# Patient Record
Sex: FEMALE | Race: WHITE | NOT HISPANIC OR LATINO | Employment: OTHER | URBAN - METROPOLITAN AREA
[De-identification: names, ages, dates, MRNs, and addresses within clinical notes are randomized per-mention and may not be internally consistent; named-entity substitution may affect disease eponyms.]

---

## 2017-04-12 ENCOUNTER — TELEPHONE (OUTPATIENT)
Dept: SMOKING CESSATION | Facility: CLINIC | Age: 82
End: 2017-04-12

## 2017-04-19 ENCOUNTER — CLINICAL SUPPORT (OUTPATIENT)
Dept: SMOKING CESSATION | Facility: CLINIC | Age: 82
End: 2017-04-19
Payer: COMMERCIAL

## 2017-04-19 DIAGNOSIS — F17.200 NICOTINE DEPENDENCE: Primary | ICD-10-CM

## 2017-04-19 PROCEDURE — 99407 BEHAV CHNG SMOKING > 10 MIN: CPT | Mod: S$GLB,,,

## 2017-05-25 ENCOUNTER — HOSPITAL ENCOUNTER (OUTPATIENT)
Dept: RESPIRATORY THERAPY | Facility: HOSPITAL | Age: 82
Discharge: HOME | End: 2017-05-25
Attending: INTERNAL MEDICINE
Payer: MEDICARE

## 2017-05-25 DIAGNOSIS — J44.1 COPD EXACERBATION: ICD-10-CM

## 2017-05-25 DIAGNOSIS — J44.1 COPD EXACERBATION: Primary | ICD-10-CM

## 2017-05-25 PROCEDURE — 94010 BREATHING CAPACITY TEST: CPT

## 2017-06-08 NOTE — PROCEDURES
DATE OF STUDY:  05/25/2017    The effort appears adequate.  The inspiratory portion of the flow volume loop is   normal.  The FVC is normal.  FEV1 is mildly reduced at 1.3 liters or 71% of   predicted.  FEV1/FVC is moderately reduced.  FEF 25-75 is severely reduced.    After administering an appropriate bronchodilator, no significant change is   appreciated.    IMPRESSION:  Spirometry demonstrates a fixed moderate obstruction.  Clinical and   radiographic correlation is suggested.      HES/SN  dd: 06/07/2017 18:31:18 (CDT)  td: 06/08/2017 01:25:03 (CDT)  Doc ID   #[id number]  Job ID #[id number]    CC: Ghassan Ayoub M.D.

## 2017-10-20 ENCOUNTER — TELEPHONE (OUTPATIENT)
Dept: SMOKING CESSATION | Facility: CLINIC | Age: 82
End: 2017-10-20

## 2017-11-07 ENCOUNTER — TELEPHONE (OUTPATIENT)
Dept: SMOKING CESSATION | Facility: CLINIC | Age: 82
End: 2017-11-07

## 2017-12-18 ENCOUNTER — CLINICAL SUPPORT (OUTPATIENT)
Dept: SMOKING CESSATION | Facility: CLINIC | Age: 82
End: 2017-12-18
Payer: COMMERCIAL

## 2017-12-18 DIAGNOSIS — F17.200 NICOTINE DEPENDENCE: Primary | ICD-10-CM

## 2017-12-18 PROCEDURE — 99407 BEHAV CHNG SMOKING > 10 MIN: CPT | Mod: S$GLB,,,

## 2018-01-02 ENCOUNTER — CLINICAL SUPPORT (OUTPATIENT)
Dept: SMOKING CESSATION | Facility: CLINIC | Age: 83
End: 2018-01-02
Payer: COMMERCIAL

## 2018-01-02 DIAGNOSIS — F17.200 NICOTINE DEPENDENCE: Primary | ICD-10-CM

## 2018-01-02 PROCEDURE — 99404 PREV MED CNSL INDIV APPRX 60: CPT | Mod: S$GLB,,,

## 2018-01-02 PROCEDURE — 99999 PR PBB SHADOW E&M-EST. PATIENT-LVL I: CPT | Mod: PBBFAC,,,

## 2018-01-02 RX ORDER — DM/P-EPHED/ACETAMINOPH/DOXYLAM 30-7.5/3
2 LIQUID (ML) ORAL
Qty: 108 LOZENGE | Refills: 0 | Status: SHIPPED | OUTPATIENT
Start: 2018-01-02 | End: 2018-02-06

## 2018-01-09 ENCOUNTER — CLINICAL SUPPORT (OUTPATIENT)
Dept: SMOKING CESSATION | Facility: CLINIC | Age: 83
End: 2018-01-09
Payer: COMMERCIAL

## 2018-01-09 DIAGNOSIS — F17.200 NICOTINE DEPENDENCE: Primary | ICD-10-CM

## 2018-01-09 PROCEDURE — 99999 PR PBB SHADOW E&M-EST. PATIENT-LVL I: CPT | Mod: PBBFAC,,,

## 2018-01-09 PROCEDURE — 90853 GROUP PSYCHOTHERAPY: CPT | Mod: S$GLB,,,

## 2018-01-09 NOTE — Clinical Note
Patient reports that she is tobacco free for 3 days. Patient is using strategies previously discussed. She is returning to Group and has completed the program several times in the past. She is motivated to stay tobacco free and she and a friend are supporting each other. The patient remains on the prescribed tobacco cessation medication regimen of 2 mg nicotine lozenge as needed, without any negative side effects at this time. She states that she is using the lozenge about 3-4 times per day. The patient will continue to come to the clinic for additional support and encouragement.

## 2018-01-09 NOTE — PROGRESS NOTES
Site: [facility]  Date:  1/9/2018  Clinical Status of Patient: Outpatient   Length of Service and Code: 60 minutes - 53800   Number in Attendance: 5  Group Activities/Focus of Group:  orientation, client introductions, completion of TCRS (Tobacco Cessation Rating Scale) learned addiction model, cues/triggers, personal reasons for quitting, medications, goals, quit date    Target symptoms:  withdrawal and medication side effects             The following were rated moderate (3) to severe (4) on TCRS:       Moderate 3: none     Severe 4: none   Patient's Response to Intervention: Patient reports that she is tobacco free for 3 days. Patient is using strategies previously discussed. She is returning to Group and has completed the program several times in the past. She is motivated to stay tobacco free and she and a friend are supporting each other. The patient remains on the prescribed tobacco cessation medication regimen of 2 mg nicotine lozenge as needed, without any negative side effects at this time. She states that she is using the lozenge about 3-4 times per day. The patient will continue to come to the clinic for additional support and encouragement.     Progress Toward Goals and Other Mental Status Changes: The patient denies any abnormal behavioral or mental changes at this time.   Diagnosis: Z72.0  Plan: The patient will continue with group therapy sessions and medication monitoring by CTTS. Prescribed medication management will be by physician.   Return to Clinic: 1 week

## 2018-01-16 ENCOUNTER — CLINICAL SUPPORT (OUTPATIENT)
Dept: SMOKING CESSATION | Facility: CLINIC | Age: 83
End: 2018-01-16
Payer: COMMERCIAL

## 2018-01-16 DIAGNOSIS — F17.200 NICOTINE DEPENDENCE: Primary | ICD-10-CM

## 2018-01-16 PROCEDURE — 99999 PR PBB SHADOW E&M-EST. PATIENT-LVL I: CPT | Mod: PBBFAC,,,

## 2018-01-16 PROCEDURE — 90853 GROUP PSYCHOTHERAPY: CPT | Mod: S$GLB,,,

## 2018-01-16 RX ORDER — IBUPROFEN 200 MG
1 TABLET ORAL DAILY
Qty: 14 PATCH | Refills: 0 | Status: SHIPPED | OUTPATIENT
Start: 2018-01-16 | End: 2021-03-17

## 2018-01-16 NOTE — PROGRESS NOTES
Smoking Cessation Group Session #2    Site: [facility]  Date:  1/16/2018  Clinical Status of Patient: Outpatient   Length of Service and Code: 60 minutes - 11469   Number in Attendance: 5  Group Activities/Focus of Group:  Sharing last weeks challenges, triggers, and coping activities to remain quit and/ or keep making progress toward cessation, completion of TCRS (Tobacco Cessation Rating Scale) learned addiction model, personal reasons for quitting, medications, goals, quit date.    Specific session focus: completion of TCRS (Tobacco Cessation Rating Scale) reviewed strategies, cues, and triggers. Introduced the negative impact of tobacco on health, the health advantages of discontinuing the use of tobacco, time line improved health changes after a quit, withdrawal issues to expect from nicotine and habit, and ways to achieve the goal of a quit.  Target symptoms:  withdrawal and medication side effects             The following were rated moderate (3) to severe (4) on TCRS:       Moderate 3: none     Severe 4:   none  Patient's Response to Intervention: Patient remains tobacco free at this time. Patient is using strategies previously discussed.  The patient remains on the prescribed tobacco cessation medication regimen of 14 mg nicotine patch QD and 2 mg nicotine lozenge as needed, without any negative side effects at this time. The patient will continue to come to the clinic for additional support and encouragement.   Progress Toward Goals and Other Mental Status Changes: The patient denies any abnormal behavioral or mental changes at this time.   Diagnosis: Z72.0  Plan: The patient will continue with group therapy sessions and medication monitoring by CTTS. Prescribed medication management will be by physician.   Return to Clinic: 1 week    Quit Date:  1/6/2018  Planned Quit Date:

## 2018-01-16 NOTE — Clinical Note
Patient remains tobacco free at this time. Patient is using strategies previously discussed. The patient remains on the prescribed tobacco cessation medication regimen of 14 mg nicotine patch QD and 2 mg nicotine lozenge as needed, without any negative side effects at this time. The patient will continue to come to the clinic for additional support and encouragement.

## 2018-01-23 ENCOUNTER — CLINICAL SUPPORT (OUTPATIENT)
Dept: SMOKING CESSATION | Facility: CLINIC | Age: 83
End: 2018-01-23
Payer: COMMERCIAL

## 2018-01-23 DIAGNOSIS — F17.200 NICOTINE DEPENDENCE: Primary | ICD-10-CM

## 2018-01-23 PROCEDURE — 90853 GROUP PSYCHOTHERAPY: CPT | Mod: S$GLB,,,

## 2018-01-23 PROCEDURE — 99999 PR PBB SHADOW E&M-EST. PATIENT-LVL I: CPT | Mod: PBBFAC,,,

## 2018-01-23 NOTE — PROGRESS NOTES
Smoking Cessation Group Session #3    Site: [facility]  Date:  1/23/2018  Clinical Status of Patient: Outpatient   Length of Service and Code: 60 minutes - 32752   Number in Attendance: 5  Group Activities/Focus of Group:  Sharing last weeks challenges, triggers, and coping activities to remain quit and/ or keep making progress toward cessation, completion of TCRS (Tobacco Cessation Rating Scale) learned addiction model, personal reasons for quitting, medications, goals, quit date.    Specific session focus: completion of TCRS (Tobacco Cessation Rating Scale) reviewed strategies, controlling environment, cues, triggers, new goals set. Introduced high risk situations with preparation interventions, caffeine similarities with withdrawal issues of habit and nicotine, Alcohol, Understanding urges, cravings, stress and relaxation. Open discussion with intervention discussion.  Target symptoms:  withdrawal and medication side effects             The following were rated moderate (3) to severe (4) on TCRS:       Moderate 3: irritable - discussed withdrawal and habit      Severe 4:   none  Patient's Response to Intervention: Patient remains tobacco free since 1/6/2018. Patient is using strategies previously discussed.  The patient remains on the prescribed tobacco cessation medication regimen of 14 mg nicotine patch QD without any negative side effects at this time. The patient will continue to come to the clinic for additional support and encouragement.   Progress Toward Goals and Other Mental Status Changes: The patient denies any abnormal behavioral or mental changes at this time.     Diagnosis: Z72.0  Plan: The patient will continue with group therapy sessions and medication monitoring by CTTS. Prescribed medication management will be by physician.   Return to Clinic: 1 week    Quit Date: 1/6/2018   Planned Quit Date:

## 2018-01-23 NOTE — Clinical Note
Patient remains tobacco free since 1/6/2018. Patient is using strategies previously discussed. The patient remains on the prescribed tobacco cessation medication regimen of 14 mg nicotine patch QD without any negative side effects at this time. The patient will continue to come to the clinic for additional support and encouragement.

## 2018-02-06 ENCOUNTER — CLINICAL SUPPORT (OUTPATIENT)
Dept: SMOKING CESSATION | Facility: CLINIC | Age: 83
End: 2018-02-06
Payer: COMMERCIAL

## 2018-02-06 DIAGNOSIS — F17.200 NICOTINE DEPENDENCE: Primary | ICD-10-CM

## 2018-02-06 PROCEDURE — 99999 PR PBB SHADOW E&M-EST. PATIENT-LVL I: CPT | Mod: PBBFAC,,,

## 2018-02-06 PROCEDURE — 90853 GROUP PSYCHOTHERAPY: CPT | Mod: S$GLB,,,

## 2018-02-06 NOTE — Clinical Note
Patient has has a slip this past week, due to a conflict with her best friend that upset her, so she went out and bought a pack of cigarettes. She smoked about 4 cigarettes per day since her slip. We discussed her triggers related to the slip and how she would approach this if it happened again. She had stopped using her nicotine patch QD, she had tolerated well without any negative side effects thus far. I recommended she resume of her use of her prescribed tobacco cessation regimen of 14 mg nicotine patch QD.

## 2018-02-06 NOTE — PROGRESS NOTES
Smoking Cessation Group Session #4    Site: [facility]  Date:  2/6/2018  Clinical Status of Patient: Outpatient   Length of Service and Code: 60 minutes - 60100   Number in Attendance: 5  Group Activities/Focus of Group:  Sharing last weeks challenges, triggers, and coping activities to remain quit and/ or keep making progress toward cessation, completion of TCRS (Tobacco Cessation Rating Scale) learned addiction model, personal reasons for quitting, medications, goals, quit date.    Specific session focus: completion of TCRS (Tobacco Cessation Rating Scale) reviewed strategies, habitual behavior, high risks situations, understanding urges and cravings, stress and relaxation with open discussion and additional interventions, Introduced lapses, relapses, understanding them and analyzing the situation of a lapse, conflict issues that may be linked to a lapse.   Target symptoms:  withdrawal and medication side effects             The following were rated moderate (3) to severe (4) on TCRS:       Moderate 3: Desire or crave - discussed habit and withdrawal      Severe 4:   none  Patient's Response to Intervention: Patient has has a slip this past week, due to a conflict with her best friend that upset her, so she went out and bought a pack of cigarettes. She smoked about 4 cigarettes per day since her slip. We discussed her triggers related to the slip and how she would approach this if it happened again. She had stopped using her nicotine patch QD, she had tolerated well without any negative side effects thus far. I recommended she resume of her use of her prescribed tobacco cessation regimen of 14 mg nicotine patch QD.   Progress Toward Goals and Other Mental Status Changes: The patient denies any abnormal behavioral or mental changes at this time.   Diagnosis: Z72.0  Plan: The patient will continue with group therapy sessions and medication monitoring by CTTS. Prescribed medication management will be by physician.    Return to Clinic: 2 weeks    Quit Date:    Planned Quit Date: discussed but not yet set

## 2018-04-17 ENCOUNTER — CLINICAL SUPPORT (OUTPATIENT)
Dept: SMOKING CESSATION | Facility: CLINIC | Age: 83
End: 2018-04-17
Payer: COMMERCIAL

## 2018-04-17 DIAGNOSIS — F17.200 NICOTINE DEPENDENCE: Primary | ICD-10-CM

## 2018-04-17 PROCEDURE — 99407 BEHAV CHNG SMOKING > 10 MIN: CPT | Mod: S$GLB,,,

## 2018-05-22 ENCOUNTER — LAB VISIT (OUTPATIENT)
Dept: LAB | Facility: HOSPITAL | Age: 83
End: 2018-05-22
Attending: INTERNAL MEDICINE
Payer: MEDICARE

## 2018-05-22 DIAGNOSIS — E78.2 MIXED HYPERLIPIDEMIA: Primary | ICD-10-CM

## 2018-05-22 DIAGNOSIS — R73.09 OTHER ABNORMAL GLUCOSE: ICD-10-CM

## 2018-05-22 DIAGNOSIS — I10 ESSENTIAL HYPERTENSION, MALIGNANT: ICD-10-CM

## 2018-05-22 LAB
ALBUMIN SERPL BCP-MCNC: 3.6 G/DL
ALP SERPL-CCNC: 58 U/L
ALT SERPL W/O P-5'-P-CCNC: 13 U/L
ANION GAP SERPL CALC-SCNC: 8 MMOL/L
AST SERPL-CCNC: 16 U/L
BASOPHILS # BLD AUTO: 0.1 K/UL
BASOPHILS NFR BLD: 0.9 %
BILIRUB SERPL-MCNC: 0.7 MG/DL
BILIRUB UR QL STRIP: NEGATIVE
BUN SERPL-MCNC: 16 MG/DL
CALCIUM SERPL-MCNC: 9.4 MG/DL
CHLORIDE SERPL-SCNC: 92 MMOL/L
CHOLEST SERPL-MCNC: 165 MG/DL
CHOLEST/HDLC SERPL: 2.5 {RATIO}
CLARITY UR: CLEAR
CO2 SERPL-SCNC: 32 MMOL/L
COLOR UR: YELLOW
CREAT SERPL-MCNC: 0.9 MG/DL
DIFFERENTIAL METHOD: ABNORMAL
EOSINOPHIL # BLD AUTO: 0.1 K/UL
EOSINOPHIL NFR BLD: 0.8 %
ERYTHROCYTE [DISTWIDTH] IN BLOOD BY AUTOMATED COUNT: 13.6 %
EST. GFR  (AFRICAN AMERICAN): >60 ML/MIN/1.73 M^2
EST. GFR  (NON AFRICAN AMERICAN): 58 ML/MIN/1.73 M^2
GLUCOSE SERPL-MCNC: 124 MG/DL
GLUCOSE UR QL STRIP: NEGATIVE
HCT VFR BLD AUTO: 43.1 %
HDLC SERPL-MCNC: 65 MG/DL
HDLC SERPL: 39.4 %
HGB BLD-MCNC: 14.6 G/DL
HGB UR QL STRIP: NEGATIVE
KETONES UR QL STRIP: NEGATIVE
LDLC SERPL CALC-MCNC: 85.8 MG/DL
LEUKOCYTE ESTERASE UR QL STRIP: NEGATIVE
LYMPHOCYTES # BLD AUTO: 1.1 K/UL
LYMPHOCYTES NFR BLD: 16.3 %
MCH RBC QN AUTO: 31.2 PG
MCHC RBC AUTO-ENTMCNC: 33.9 G/DL
MCV RBC AUTO: 92 FL
MONOCYTES # BLD AUTO: 0.6 K/UL
MONOCYTES NFR BLD: 8.9 %
NEUTROPHILS # BLD AUTO: 5.1 K/UL
NEUTROPHILS NFR BLD: 73.1 %
NITRITE UR QL STRIP: NEGATIVE
NONHDLC SERPL-MCNC: 100 MG/DL
PH UR STRIP: 8 [PH] (ref 5–8)
PLATELET # BLD AUTO: 282 K/UL
PMV BLD AUTO: 7.9 FL
POTASSIUM SERPL-SCNC: 3.8 MMOL/L
PROT SERPL-MCNC: 7.6 G/DL
PROT UR QL STRIP: NEGATIVE
RBC # BLD AUTO: 4.68 M/UL
SODIUM SERPL-SCNC: 132 MMOL/L
SP GR UR STRIP: 1.01 (ref 1–1.03)
TRIGL SERPL-MCNC: 71 MG/DL
URN SPEC COLLECT METH UR: NORMAL
UROBILINOGEN UR STRIP-ACNC: 1 EU/DL
WBC # BLD AUTO: 7 K/UL

## 2018-05-22 PROCEDURE — 87086 URINE CULTURE/COLONY COUNT: CPT

## 2018-05-22 PROCEDURE — 80061 LIPID PANEL: CPT

## 2018-05-22 PROCEDURE — 81003 URINALYSIS AUTO W/O SCOPE: CPT

## 2018-05-22 PROCEDURE — 36415 COLL VENOUS BLD VENIPUNCTURE: CPT

## 2018-05-22 PROCEDURE — 80053 COMPREHEN METABOLIC PANEL: CPT

## 2018-05-22 PROCEDURE — 85025 COMPLETE CBC W/AUTO DIFF WBC: CPT

## 2018-05-23 LAB — BACTERIA UR CULT: NO GROWTH

## 2018-06-12 ENCOUNTER — OFFICE VISIT (OUTPATIENT)
Dept: PODIATRY | Facility: CLINIC | Age: 83
End: 2018-06-12
Payer: MEDICARE

## 2018-06-12 ENCOUNTER — HOSPITAL ENCOUNTER (OUTPATIENT)
Dept: RADIOLOGY | Facility: CLINIC | Age: 83
Discharge: HOME | End: 2018-06-12
Attending: PODIATRIST
Payer: MEDICARE

## 2018-06-12 VITALS — WEIGHT: 149.94 LBS | BODY MASS INDEX: 25.6 KG/M2 | HEIGHT: 64 IN

## 2018-06-12 DIAGNOSIS — M20.42 HAMMER TOES OF BOTH FEET: ICD-10-CM

## 2018-06-12 DIAGNOSIS — M79.671 FOOT PAIN, BILATERAL: ICD-10-CM

## 2018-06-12 DIAGNOSIS — M20.41 HAMMER TOES OF BOTH FEET: ICD-10-CM

## 2018-06-12 DIAGNOSIS — M79.671 FOOT PAIN, BILATERAL: Primary | ICD-10-CM

## 2018-06-12 DIAGNOSIS — M77.9 CAPSULITIS: ICD-10-CM

## 2018-06-12 DIAGNOSIS — M79.672 FOOT PAIN, BILATERAL: Primary | ICD-10-CM

## 2018-06-12 DIAGNOSIS — M79.672 FOOT PAIN, BILATERAL: ICD-10-CM

## 2018-06-12 PROCEDURE — 29540 STRAPPING ANKLE &/FOOT: CPT | Mod: 50,S$GLB,, | Performed by: PODIATRIST

## 2018-06-12 PROCEDURE — 73630 X-RAY EXAM OF FOOT: CPT | Mod: 26,50,S$GLB, | Performed by: RADIOLOGY

## 2018-06-12 PROCEDURE — 99203 OFFICE O/P NEW LOW 30 MIN: CPT | Mod: 25,S$GLB,, | Performed by: PODIATRIST

## 2018-06-12 PROCEDURE — 99999 PR PBB SHADOW E&M-EST. PATIENT-LVL III: CPT | Mod: PBBFAC,,, | Performed by: PODIATRIST

## 2018-06-12 PROCEDURE — 73630 X-RAY EXAM OF FOOT: CPT | Mod: 50,TC,FY,PO

## 2018-06-12 RX ORDER — HYDROCHLOROTHIAZIDE 25 MG/1
TABLET ORAL
Refills: 3 | COMMUNITY
Start: 2018-04-09 | End: 2021-03-17 | Stop reason: ALTCHOICE

## 2018-06-12 RX ORDER — IPRATROPIUM BROMIDE AND ALBUTEROL SULFATE 2.5; .5 MG/3ML; MG/3ML
SOLUTION RESPIRATORY (INHALATION)
Refills: 1 | COMMUNITY
Start: 2018-06-07 | End: 2021-03-17 | Stop reason: SDUPTHER

## 2018-06-12 RX ORDER — LOSARTAN POTASSIUM 100 MG/1
100 TABLET ORAL DAILY
Refills: 1 | COMMUNITY
Start: 2018-05-09 | End: 2021-06-29 | Stop reason: SDUPTHER

## 2018-06-12 RX ORDER — LIDOCAINE HYDROCHLORIDE 20 MG/ML
JELLY TOPICAL
Qty: 30 ML | Refills: 2 | Status: SHIPPED | OUTPATIENT
Start: 2018-06-12 | End: 2021-03-17

## 2018-06-12 NOTE — PROGRESS NOTES
Subjective:      Patient ID: Marietta Gates is a 86 y.o. female.    Chief Complaint: Foot Pain (bilateral)    Sharp deep pain beneath forefoot right and left.. Gradual onset, worsening over past several weeks, aggravated by increased weight bearing, shoe gear, pressure.  No previous medical treatment.  OTC pain med not helping. Denies trauma.  Doesn't remember surgery either foot.    Review of Systems   Constitution: Negative for chills, diaphoresis, fever, malaise/fatigue and night sweats.   Cardiovascular: Negative for claudication, cyanosis, leg swelling and syncope.   Skin: Negative for color change, dry skin, nail changes, rash, suspicious lesions and unusual hair distribution.   Musculoskeletal: Positive for joint pain. Negative for falls, joint swelling, muscle cramps, muscle weakness and stiffness.   Gastrointestinal: Negative for constipation, diarrhea, nausea and vomiting.   Neurological: Negative for brief paralysis, disturbances in coordination, focal weakness, numbness, paresthesias, sensory change and tremors.           Objective:      Physical Exam   Constitutional: She is oriented to person, place, and time. She appears well-developed and well-nourished. She is cooperative. No distress.   Cardiovascular:   Pulses:       Popliteal pulses are 2+ on the right side, and 2+ on the left side.        Dorsalis pedis pulses are 1+ on the right side, and 1+ on the left side.        Posterior tibial pulses are 1+ on the right side, and 1+ on the left side.   Capillary refill 3 seconds all toes/distal feet, all toes/both feet warm to touch.      Negative lymphadenopathy bilateral popliteal fossa and tarsal tunnel.      Negavie lower extremity edema bilateral.     Musculoskeletal:        Right ankle: She exhibits normal range of motion, no swelling, no ecchymosis, no deformity, no laceration and normal pulse. Achilles tendon normal. Achilles tendon exhibits no pain, no defect and normal Magallon's test  results.   Patient has hammertoes of digits   2-5 with all but 2nd toe bilateral                partially reducible without symptom today.    Pain to palpation inferior mtpj 2-4 bilateral without evidence of trauma or infection.    Otherwise, Normal angle, base, station of gait. All ten toes without clubbing, cyanosis, or signs of ischemia.  No pain to palpation bilateral lower extremities.  Range of motion, stability, muscle strength, and muscle tone normal bilateral feet and legs.     Lymphadenopathy:   Negative lymphadenopathy bilateral popliteal fossa and tarsal tunnel.    Negative lymphangitic streaking bilateral feet/ankles/legs.   Neurological: She is alert and oriented to person, place, and time. She has normal strength. She displays no atrophy and no tremor. No sensory deficit. She exhibits normal muscle tone. Gait normal.   Reflex Scores:       Patellar reflexes are 2+ on the right side and 2+ on the left side.       Achilles reflexes are 2+ on the right side and 2+ on the left side.  Negative tinel sign to percussion sural, superficial peroneal, deep peroneal, saphenous, and posterior tibial nerves right and left ankles and feet.      Negative moulder sign/click bilateral all intermetatarsal spaces.     Skin: Skin is warm, dry and intact. Capillary refill takes 2 to 3 seconds. No abrasion, no bruising, no burn, no ecchymosis, no laceration, no lesion and no rash noted. She is not diaphoretic. No cyanosis or erythema. No pallor. Nails show no clubbing.     Skin is normal age and health appropriate color, turgor, texture, and temperature bilateral lower extremities without ulceration, hyperpigmentation, discoloration, masses nodules or cords palpated.  No ecchymosis, erythema, edema, or cardinal signs of infection bilateral lower extremities.     Psychiatric: She has a normal mood and affect.             Assessment:       Encounter Diagnoses   Name Primary?    Foot pain, bilateral Yes    Capsulitis      Hammer toes of both feet          Plan:       Marietta was seen today for foot pain.    Diagnoses and all orders for this visit:    Foot pain, bilateral  -     X-Ray Foot Complete Bilateral; Future    Capsulitis  -     X-Ray Foot Complete Bilateral; Future    Hammer toes of both feet  -     X-Ray Foot Complete Bilateral; Future    Other orders  -     lidocaine HCL 2% (XYLOCAINE) 2 % jelly; Apply topically as needed. Apply topically once nightly to affected part of foot/feet.      I counseled the patient on her conditions, their implications and medical management.    Patient will stretch the tendo achilles complex three times daily as demonstrated in the office.  Literature was dispensed illustrating proper stretching technique.    I applied a plantar rest strapping to the patient's right and left foot to offload symptomatic area, support the arch, and relieve pain.    Patient will obtain over the counter arch supports and wear them in shoes whenever possible.  Athletic shoes intended for walking or running are usually best.    Discussed conservative treatment with shoes of adequate dimensions, material, and style to alleviate symptoms and delay or prevent surgical intervention.    Rx xrays, lidocaine gel.              Follow-up in about 1 month (around 7/12/2018).

## 2018-06-29 ENCOUNTER — CLINICAL SUPPORT (OUTPATIENT)
Dept: SMOKING CESSATION | Facility: CLINIC | Age: 83
End: 2018-06-29
Payer: COMMERCIAL

## 2018-06-29 DIAGNOSIS — F17.200 NICOTINE DEPENDENCE: Primary | ICD-10-CM

## 2018-06-29 PROCEDURE — 99407 BEHAV CHNG SMOKING > 10 MIN: CPT | Mod: S$GLB,,,

## 2018-06-29 NOTE — PROGRESS NOTES
Called pt to f/u on her 6 month smoking cessation quit status. Pt stated she is still smoking. Informed her she has benefits available and is able to rejoin. Pt scheduled appointment for quit #2 on 7/12/18.

## 2018-07-10 ENCOUNTER — OFFICE VISIT (OUTPATIENT)
Dept: PODIATRY | Facility: CLINIC | Age: 83
End: 2018-07-10
Payer: MEDICARE

## 2018-07-10 VITALS — WEIGHT: 149.94 LBS | BODY MASS INDEX: 26.57 KG/M2 | HEIGHT: 63 IN | RESPIRATION RATE: 14 BRPM

## 2018-07-10 DIAGNOSIS — M79.672 FOOT PAIN, BILATERAL: Primary | ICD-10-CM

## 2018-07-10 DIAGNOSIS — M77.9 CAPSULITIS: ICD-10-CM

## 2018-07-10 DIAGNOSIS — M20.42 HAMMER TOES OF BOTH FEET: ICD-10-CM

## 2018-07-10 DIAGNOSIS — M20.41 HAMMER TOES OF BOTH FEET: ICD-10-CM

## 2018-07-10 DIAGNOSIS — M79.671 FOOT PAIN, BILATERAL: Primary | ICD-10-CM

## 2018-07-10 PROCEDURE — 99999 PR PBB SHADOW E&M-EST. PATIENT-LVL III: CPT | Mod: PBBFAC,,, | Performed by: PODIATRIST

## 2018-07-10 PROCEDURE — 99212 OFFICE O/P EST SF 10 MIN: CPT | Mod: S$GLB,,, | Performed by: PODIATRIST

## 2018-07-10 RX ORDER — AMLODIPINE BESYLATE 5 MG/1
TABLET ORAL
Refills: 1 | COMMUNITY
Start: 2018-06-29 | End: 2021-03-17 | Stop reason: DRUGHIGH

## 2018-07-10 NOTE — PROGRESS NOTES
Subjective:      Patient ID: Marietta Gates is a 86 y.o. female.    Chief Complaint: Foot Pain (bilateral )    Sharp deep pain beneath forefoot right and left.. Gradual onset, worsening over past several weeks, aggravated by increased weight bearing, shoe gear, pressure.  Improved to resolution with stretches, otc inserts.  Still wearing small slip on ked type shoes.  Doesn't remember surgery either foot.    Review of Systems   Constitution: Negative for chills, diaphoresis, fever, malaise/fatigue and night sweats.   Cardiovascular: Negative for claudication, cyanosis, leg swelling and syncope.   Skin: Negative for color change, dry skin, nail changes, rash, suspicious lesions and unusual hair distribution.   Musculoskeletal: Positive for joint pain. Negative for falls, joint swelling, muscle cramps, muscle weakness and stiffness.   Gastrointestinal: Negative for constipation, diarrhea, nausea and vomiting.   Neurological: Negative for brief paralysis, disturbances in coordination, focal weakness, numbness, paresthesias, sensory change and tremors.           Objective:      Physical Exam   Constitutional: She is oriented to person, place, and time. She appears well-developed and well-nourished. She is cooperative. No distress.   Cardiovascular:   Pulses:       Popliteal pulses are 2+ on the right side, and 2+ on the left side.        Dorsalis pedis pulses are 1+ on the right side, and 1+ on the left side.        Posterior tibial pulses are 1+ on the right side, and 1+ on the left side.   Capillary refill 3 seconds all toes/distal feet, all toes/both feet warm to touch.      Negative lymphadenopathy bilateral popliteal fossa and tarsal tunnel.      Negavie lower extremity edema bilateral.     Musculoskeletal:        Right ankle: She exhibits normal range of motion, no swelling, no ecchymosis, no deformity, no laceration and normal pulse. Achilles tendon normal. Achilles tendon exhibits no pain, no defect and  normal Magallon's test results.   Patient has hammertoes of digits   2-5 with all but 2nd toe bilateral                partially reducible without symptom today.    No current pain to palpation inferior mtpj 2-4 bilateral without evidence of trauma or infection.    Otherwise, Normal angle, base, station of gait. All ten toes without clubbing, cyanosis, or signs of ischemia.  No pain to palpation bilateral lower extremities.  Range of motion, stability, muscle strength, and muscle tone normal bilateral feet and legs.     Lymphadenopathy:   Negative lymphadenopathy bilateral popliteal fossa and tarsal tunnel.    Negative lymphangitic streaking bilateral feet/ankles/legs.   Neurological: She is alert and oriented to person, place, and time. She has normal strength. She displays no atrophy and no tremor. No sensory deficit. She exhibits normal muscle tone. Gait normal.   Reflex Scores:       Patellar reflexes are 2+ on the right side and 2+ on the left side.       Achilles reflexes are 2+ on the right side and 2+ on the left side.  Negative tinel sign to percussion sural, superficial peroneal, deep peroneal, saphenous, and posterior tibial nerves right and left ankles and feet.      Negative moulder sign/click bilateral all intermetatarsal spaces.     Skin: Skin is warm, dry and intact. Capillary refill takes 2 to 3 seconds. No abrasion, no bruising, no burn, no ecchymosis, no laceration, no lesion and no rash noted. She is not diaphoretic. No cyanosis or erythema. No pallor. Nails show no clubbing.     Skin is normal age and health appropriate color, turgor, texture, and temperature bilateral lower extremities without ulceration, hyperpigmentation, discoloration, masses nodules or cords palpated.  No ecchymosis, erythema, edema, or cardinal signs of infection bilateral lower extremities.     Psychiatric: She has a normal mood and affect.             Assessment:       Encounter Diagnoses   Name Primary?    Foot pain,  bilateral Yes    Capsulitis     Hammer toes of both feet          Plan:       Marietta was seen today for foot pain.    Diagnoses and all orders for this visit:    Foot pain, bilateral    Capsulitis    Hammer toes of both feet      I counseled the patient on her conditions, their implications and medical management.    Continue stretches, inserts, athletic type shoes if amenable.                Follow-up if symptoms worsen or fail to improve.

## 2018-11-09 ENCOUNTER — LAB VISIT (OUTPATIENT)
Dept: LAB | Facility: HOSPITAL | Age: 83
End: 2018-11-09
Attending: INTERNAL MEDICINE
Payer: MEDICARE

## 2018-11-09 DIAGNOSIS — R73.09 OTHER ABNORMAL GLUCOSE: ICD-10-CM

## 2018-11-09 DIAGNOSIS — I10 ESSENTIAL HYPERTENSION, MALIGNANT: Primary | ICD-10-CM

## 2018-11-09 LAB
ALBUMIN SERPL BCP-MCNC: 3.6 G/DL
ALP SERPL-CCNC: 56 U/L
ALT SERPL W/O P-5'-P-CCNC: 14 U/L
ANION GAP SERPL CALC-SCNC: 10 MMOL/L
AST SERPL-CCNC: 17 U/L
BASOPHILS # BLD AUTO: 0 K/UL
BASOPHILS NFR BLD: 0.4 %
BILIRUB SERPL-MCNC: 0.6 MG/DL
BILIRUB UR QL STRIP: NEGATIVE
BUN SERPL-MCNC: 11 MG/DL
CALCIUM SERPL-MCNC: 9.7 MG/DL
CHLORIDE SERPL-SCNC: 94 MMOL/L
CHOLEST SERPL-MCNC: 185 MG/DL
CHOLEST/HDLC SERPL: 2.7 {RATIO}
CLARITY UR: CLEAR
CO2 SERPL-SCNC: 30 MMOL/L
COLOR UR: YELLOW
CREAT SERPL-MCNC: 0.8 MG/DL
DIFFERENTIAL METHOD: ABNORMAL
EOSINOPHIL # BLD AUTO: 0 K/UL
EOSINOPHIL NFR BLD: 0.5 %
ERYTHROCYTE [DISTWIDTH] IN BLOOD BY AUTOMATED COUNT: 13.6 %
EST. GFR  (AFRICAN AMERICAN): >60 ML/MIN/1.73 M^2
EST. GFR  (NON AFRICAN AMERICAN): >60 ML/MIN/1.73 M^2
GLUCOSE SERPL-MCNC: 115 MG/DL
GLUCOSE UR QL STRIP: NEGATIVE
HCT VFR BLD AUTO: 45 %
HDLC SERPL-MCNC: 68 MG/DL
HDLC SERPL: 36.8 %
HGB BLD-MCNC: 15.1 G/DL
HGB UR QL STRIP: NEGATIVE
KETONES UR QL STRIP: NEGATIVE
LDLC SERPL CALC-MCNC: 96.2 MG/DL
LEUKOCYTE ESTERASE UR QL STRIP: NEGATIVE
LYMPHOCYTES # BLD AUTO: 1.1 K/UL
LYMPHOCYTES NFR BLD: 15.9 %
MCH RBC QN AUTO: 31.7 PG
MCHC RBC AUTO-ENTMCNC: 33.6 G/DL
MCV RBC AUTO: 94 FL
MONOCYTES # BLD AUTO: 0.6 K/UL
MONOCYTES NFR BLD: 8.1 %
NEUTROPHILS # BLD AUTO: 5.3 K/UL
NEUTROPHILS NFR BLD: 75.1 %
NITRITE UR QL STRIP: NEGATIVE
NONHDLC SERPL-MCNC: 117 MG/DL
PH UR STRIP: 7 [PH] (ref 5–8)
PLATELET # BLD AUTO: 278 K/UL
PMV BLD AUTO: 7.9 FL
POTASSIUM SERPL-SCNC: 4.1 MMOL/L
PROT SERPL-MCNC: 8.1 G/DL
PROT UR QL STRIP: NEGATIVE
RBC # BLD AUTO: 4.78 M/UL
SODIUM SERPL-SCNC: 134 MMOL/L
SP GR UR STRIP: 1.01 (ref 1–1.03)
TRIGL SERPL-MCNC: 104 MG/DL
URN SPEC COLLECT METH UR: NORMAL
UROBILINOGEN UR STRIP-ACNC: NEGATIVE EU/DL
WBC # BLD AUTO: 7.1 K/UL

## 2018-11-09 PROCEDURE — 80061 LIPID PANEL: CPT

## 2018-11-09 PROCEDURE — 85025 COMPLETE CBC W/AUTO DIFF WBC: CPT

## 2018-11-09 PROCEDURE — 36415 COLL VENOUS BLD VENIPUNCTURE: CPT

## 2018-11-09 PROCEDURE — 80053 COMPREHEN METABOLIC PANEL: CPT

## 2018-11-09 PROCEDURE — 81003 URINALYSIS AUTO W/O SCOPE: CPT

## 2019-01-07 ENCOUNTER — CLINICAL SUPPORT (OUTPATIENT)
Dept: SMOKING CESSATION | Facility: CLINIC | Age: 84
End: 2019-01-07
Payer: COMMERCIAL

## 2019-01-07 DIAGNOSIS — F17.200 NICOTINE DEPENDENCE: Primary | ICD-10-CM

## 2019-01-07 PROCEDURE — 99407 PR TOBACCO USE CESSATION INTENSIVE >10 MINUTES: ICD-10-PCS | Mod: S$GLB,,,

## 2019-01-07 PROCEDURE — 99407 BEHAV CHNG SMOKING > 10 MIN: CPT | Mod: S$GLB,,,

## 2019-01-07 NOTE — PROGRESS NOTES
Spoke with patient today in regard to smoking cessation progress 12 month phone follow up, states not tobacco free. Patient is not interested in returning to the smoking cessation program at this time. Will call when ready to schedule. Informed patient of benefit period, and contact information. Will complete resolve smart form for 12 month phone follow up on Quit attempt #5.

## 2019-01-30 DIAGNOSIS — J44.9 COPD (CHRONIC OBSTRUCTIVE PULMONARY DISEASE): ICD-10-CM

## 2019-01-30 DIAGNOSIS — J43.1 PANACINAR EMPHYSEMA: Primary | ICD-10-CM

## 2019-03-18 ENCOUNTER — OFFICE VISIT (OUTPATIENT)
Dept: PODIATRY | Facility: CLINIC | Age: 84
End: 2019-03-18
Payer: MEDICARE

## 2019-03-18 VITALS
TEMPERATURE: 99 F | SYSTOLIC BLOOD PRESSURE: 138 MMHG | HEART RATE: 81 BPM | HEIGHT: 63 IN | BODY MASS INDEX: 26.4 KG/M2 | DIASTOLIC BLOOD PRESSURE: 84 MMHG | WEIGHT: 149 LBS

## 2019-03-18 DIAGNOSIS — M20.42 HAMMER TOES OF BOTH FEET: ICD-10-CM

## 2019-03-18 DIAGNOSIS — M79.674 TOE PAIN, RIGHT: Primary | ICD-10-CM

## 2019-03-18 DIAGNOSIS — L84 CORN OR CALLUS: ICD-10-CM

## 2019-03-18 DIAGNOSIS — M20.41 HAMMER TOES OF BOTH FEET: ICD-10-CM

## 2019-03-18 PROCEDURE — 1101F PT FALLS ASSESS-DOCD LE1/YR: CPT | Mod: ,,, | Performed by: PODIATRIST

## 2019-03-18 PROCEDURE — 99203 PR OFFICE/OUTPT VISIT, NEW, LEVL III, 30-44 MIN: ICD-10-PCS | Mod: ,,, | Performed by: PODIATRIST

## 2019-03-18 PROCEDURE — 1101F PR PT FALLS ASSESS DOC 0-1 FALLS W/OUT INJ PAST YR: ICD-10-PCS | Mod: ,,, | Performed by: PODIATRIST

## 2019-03-18 PROCEDURE — 99203 OFFICE O/P NEW LOW 30 MIN: CPT | Mod: ,,, | Performed by: PODIATRIST

## 2019-03-18 RX ORDER — MECLIZINE HYDROCHLORIDE 25 MG/1
TABLET ORAL
COMMUNITY
Start: 2019-03-13 | End: 2021-03-17

## 2019-03-18 NOTE — PROGRESS NOTES
[address] [facility] [address]  [place] [address]  Phone: [phone]   [phone]    Patient's PCP:Ghassan Ayoub MD  Referring Provider: No ref. provider found    Subjective:      Chief Complaint:: Diabetic Foot Exam and Callouses (Right 2nd)    HPI  Marietta Gates is a 87 y.o. female who presents with a complaint of Right 2nd toe pain. Onset of the symptoms was Hammertoe.  Current symptoms include pain.Patients rates pain 8/10 on pain scale.    Patient denies being a diabetic.    Vitals:    03/18/19 1431   BP: 138/84   Pulse: 81   Temp: 98.6 °F (37 °C)     Shoe Size: 6.5    Past Surgical History:   Procedure Laterality Date    THYROIDECTOMY       Past Medical History:   Diagnosis Date    Cataract     COPD (chronic obstructive pulmonary disease)     GERD (gastroesophageal reflux disease)     Hyperlipidemia     Hypertension     Osteoporosis     Thyroid disease      History reviewed. No pertinent family history.     Social History:   Marital Status:   Alcohol History:  has no alcohol history on file.  Tobacco History:  reports that she has been smoking cigarettes.  She has a 16.00 pack-year smoking history. She does not have any smokeless tobacco history on file.  Drug History:  has no drug history on file.    Review of patient's allergies indicates:  No Known Allergies    Current Outpatient Medications   Medication Sig Dispense Refill    hydroCHLOROthiazide (HYDRODIURIL) 25 MG tablet TK 1 T PO QD IN THE MORNING  3    losartan-hydrochlorothiazide 100-12.5 mg (HYZAAR) 100-12.5 mg Tab Take 1 tablet by mouth once daily.      lovastatin (MEVACOR) 20 MG tablet Take 20 mg by mouth every evening.      meclizine (ANTIVERT) 25 mg tablet       omeprazole (PRILOSEC) 20 MG capsule Take 20 mg by mouth once daily.      albuterol (ACCUNEB) 1.25 mg/3 mL Nebu Take 1.25 mg by nebulization every 6 (six) hours as needed.      albuterol-ipratropium (DUO-NEB) 2.5 mg-0.5 mg/3 mL nebulizer solution  VVN QID PRN  1    amLODIPine (NORVASC) 5 MG tablet TK ONE T PO QD  1    ascorbic acid (VITAMIN C) 500 MG tablet Take 500 mg by mouth once daily.      benazepril (LOTENSIN) 40 MG tablet Take 40 mg by mouth once daily.      calcium carbonate (OS-KATELYN) 600 mg (1,500 mg) Tab Take 600 mg by mouth 2 (two) times daily with meals.      fish oil-omega-3 fatty acids 300-1,000 mg capsule Take 2 g by mouth once daily.      fluticasone-salmeterol 230-21 mcg/dose (ADVAIR HFA) 230-21 mcg/actuation HFAA inhaler Inhale 2 puffs into the lungs 2 (two) times daily.      ipratropium (ATROVENT) 0.03 % nasal spray 2 sprays by Nasal route every 12 (twelve) hours.      lidocaine HCL 2% (XYLOCAINE) 2 % jelly Apply topically as needed. Apply topically once nightly to affected part of foot/feet. 30 mL 2    losartan (COZAAR) 100 MG tablet TK ONE T PO QD  1    multivitamin capsule Take 1 capsule by mouth once daily.      nicotine (NICODERM CQ) 14 mg/24 hr Place 1 patch onto the skin once daily. Okay to dispense generic. 14 patch 0     No current facility-administered medications for this visit.        Review of Systems      Objective:        Physical Exam:   Foot Exam    General  General Appearance: appears stated age and healthy   Affect: appropriate   Gait: antalgic       Right Foot/Ankle     Inspection and Palpation  Hammertoes: second toe    Neurovascular  Dorsalis pedis: 1+  Posterior tibial: 1+  Saphenous nerve sensation: diminished  Tibial nerve sensation: diminished  Superficial peroneal nerve sensation: diminished  Deep peroneal nerve sensation: diminished  Sural nerve sensation: diminished      Left Foot/Ankle      Inspection and Palpation  Hammertoes: second toe    Neurovascular  Dorsalis pedis: 1+  Posterior tibial: 1+  Saphenous nerve sensation: diminished  Tibial nerve sensation: diminished  Superficial peroneal nerve sensation: diminished  Sural nerve sensation: diminished          Physical Exam   Cardiovascular:    Pulses:       Dorsalis pedis pulses are 1+ on the right side, and 1+ on the left side.        Posterior tibial pulses are 1+ on the right side, and 1+ on the left side.   Musculoskeletal:        Feet:        Imaging:            Assessment:       1. Toe pain, right    2. Corn or callus    3. Hammer toes of both feet      Plan:   Toe pain, right    Corn or callus    Hammer toes of both feet    Recommend padding on the toe to decrease pressure we shoe gear today the area was debrided she will return as needed  Follow-up if symptoms worsen or fail to improve.    Procedures - None    Counseling:     I provided patient education verbally regarding:   Patient diagnosis, treatment options, as well as alternatives, risks, and benefits.     This note was created using Dragon voice recognition software that occasionally misinterpreted phrases or words.

## 2019-11-14 ENCOUNTER — LAB VISIT (OUTPATIENT)
Dept: LAB | Facility: HOSPITAL | Age: 84
End: 2019-11-14
Attending: INTERNAL MEDICINE
Payer: MEDICARE

## 2019-11-14 DIAGNOSIS — F01.50 VASCULAR DEMENTIA, UNCOMPLICATED: Primary | ICD-10-CM

## 2019-11-14 LAB
ALBUMIN SERPL BCP-MCNC: 3.7 G/DL (ref 3.5–5.2)
ALP SERPL-CCNC: 49 U/L (ref 55–135)
ALT SERPL W/O P-5'-P-CCNC: 12 U/L (ref 10–44)
ANION GAP SERPL CALC-SCNC: 11 MMOL/L (ref 8–16)
AST SERPL-CCNC: 18 U/L (ref 10–40)
BASOPHILS # BLD AUTO: 0.06 K/UL (ref 0–0.2)
BASOPHILS NFR BLD: 0.8 % (ref 0–1.9)
BILIRUB SERPL-MCNC: 0.9 MG/DL (ref 0.1–1)
BUN SERPL-MCNC: 25 MG/DL (ref 8–23)
CALCIUM SERPL-MCNC: 9.1 MG/DL (ref 8.7–10.5)
CHLORIDE SERPL-SCNC: 93 MMOL/L (ref 95–110)
CHOLEST SERPL-MCNC: 189 MG/DL (ref 120–199)
CHOLEST/HDLC SERPL: 2.7 {RATIO} (ref 2–5)
CO2 SERPL-SCNC: 30 MMOL/L (ref 23–29)
CREAT SERPL-MCNC: 1.1 MG/DL (ref 0.5–1.4)
DIFFERENTIAL METHOD: ABNORMAL
EOSINOPHIL # BLD AUTO: 0.1 K/UL (ref 0–0.5)
EOSINOPHIL NFR BLD: 1 % (ref 0–8)
ERYTHROCYTE [DISTWIDTH] IN BLOOD BY AUTOMATED COUNT: 13.4 % (ref 11.5–14.5)
EST. GFR  (AFRICAN AMERICAN): 52.2 ML/MIN/1.73 M^2
EST. GFR  (NON AFRICAN AMERICAN): 45.3 ML/MIN/1.73 M^2
ESTIMATED AVG GLUCOSE: 111 MG/DL (ref 68–131)
GLUCOSE SERPL-MCNC: 104 MG/DL (ref 70–110)
HBA1C MFR BLD HPLC: 5.5 % (ref 4.5–6.2)
HCT VFR BLD AUTO: 44.2 % (ref 37–48.5)
HDLC SERPL-MCNC: 70 MG/DL (ref 40–75)
HDLC SERPL: 37 % (ref 20–50)
HGB BLD-MCNC: 14.8 G/DL (ref 12–16)
IMM GRANULOCYTES # BLD AUTO: 0.03 K/UL (ref 0–0.04)
IMM GRANULOCYTES NFR BLD AUTO: 0.4 % (ref 0–0.5)
LDLC SERPL CALC-MCNC: 101.6 MG/DL (ref 63–159)
LYMPHOCYTES # BLD AUTO: 1.4 K/UL (ref 1–4.8)
LYMPHOCYTES NFR BLD: 19.9 % (ref 18–48)
MCH RBC QN AUTO: 31.6 PG (ref 27–31)
MCHC RBC AUTO-ENTMCNC: 33.5 G/DL (ref 32–36)
MCV RBC AUTO: 94 FL (ref 82–98)
MONOCYTES # BLD AUTO: 0.6 K/UL (ref 0.3–1)
MONOCYTES NFR BLD: 8.1 % (ref 4–15)
NEUTROPHILS # BLD AUTO: 5 K/UL (ref 1.8–7.7)
NEUTROPHILS NFR BLD: 69.8 % (ref 38–73)
NONHDLC SERPL-MCNC: 119 MG/DL
NRBC BLD-RTO: 0 /100 WBC
PLATELET # BLD AUTO: 325 K/UL (ref 150–350)
PMV BLD AUTO: 10.5 FL (ref 9.2–12.9)
POTASSIUM SERPL-SCNC: 3.7 MMOL/L (ref 3.5–5.1)
PROT SERPL-MCNC: 7.5 G/DL (ref 6–8.4)
RBC # BLD AUTO: 4.69 M/UL (ref 4–5.4)
SODIUM SERPL-SCNC: 134 MMOL/L (ref 136–145)
TRIGL SERPL-MCNC: 87 MG/DL (ref 30–150)
WBC # BLD AUTO: 7.18 K/UL (ref 3.9–12.7)

## 2019-11-14 PROCEDURE — 85025 COMPLETE CBC W/AUTO DIFF WBC: CPT

## 2019-11-14 PROCEDURE — 36415 COLL VENOUS BLD VENIPUNCTURE: CPT

## 2019-11-14 PROCEDURE — 80061 LIPID PANEL: CPT

## 2019-11-14 PROCEDURE — 80053 COMPREHEN METABOLIC PANEL: CPT

## 2019-11-14 PROCEDURE — 83036 HEMOGLOBIN GLYCOSYLATED A1C: CPT | Mod: GZ

## 2020-11-04 LAB
CHOLEST SERPL-MSCNC: 178 MG/DL (ref 0–200)
HDLC SERPL-MCNC: 63 MG/DL
LDLC SERPL CALC-MCNC: 92 MG/DL
TRIGL SERPL-MCNC: 124 MG/DL

## 2021-03-09 ENCOUNTER — TELEPHONE (OUTPATIENT)
Dept: FAMILY MEDICINE | Facility: CLINIC | Age: 86
End: 2021-03-09

## 2021-03-12 RX ORDER — BUPROPION HYDROCHLORIDE 150 MG/1
150 TABLET ORAL DAILY
COMMUNITY
End: 2021-03-17 | Stop reason: SDUPTHER

## 2021-03-17 ENCOUNTER — OFFICE VISIT (OUTPATIENT)
Dept: FAMILY MEDICINE | Facility: CLINIC | Age: 86
End: 2021-03-17
Payer: MEDICARE

## 2021-03-17 VITALS
TEMPERATURE: 98 F | BODY MASS INDEX: 21.61 KG/M2 | SYSTOLIC BLOOD PRESSURE: 138 MMHG | DIASTOLIC BLOOD PRESSURE: 76 MMHG | WEIGHT: 121.94 LBS | OXYGEN SATURATION: 96 % | HEART RATE: 73 BPM | HEIGHT: 63 IN

## 2021-03-17 DIAGNOSIS — H54.10 BLINDNESS OF LEFT EYE WITH LOW VISION IN CONTRALATERAL EYE: ICD-10-CM

## 2021-03-17 DIAGNOSIS — R26.9 GAIT ABNORMALITY: ICD-10-CM

## 2021-03-17 DIAGNOSIS — I10 ESSENTIAL HYPERTENSION: ICD-10-CM

## 2021-03-17 DIAGNOSIS — H35.3131 EARLY DRY STAGE NONEXUDATIVE AGE-RELATED MACULAR DEGENERATION OF BOTH EYES: ICD-10-CM

## 2021-03-17 DIAGNOSIS — E78.2 MIXED HYPERLIPIDEMIA: ICD-10-CM

## 2021-03-17 DIAGNOSIS — R54 FRAIL ELDERLY: ICD-10-CM

## 2021-03-17 DIAGNOSIS — R73.03 PRE-DIABETES: ICD-10-CM

## 2021-03-17 DIAGNOSIS — K21.9 GASTROESOPHAGEAL REFLUX DISEASE WITHOUT ESOPHAGITIS: ICD-10-CM

## 2021-03-17 DIAGNOSIS — F41.9 ANXIETY AND DEPRESSION: Primary | ICD-10-CM

## 2021-03-17 DIAGNOSIS — F01.50 VASCULAR DEMENTIA WITHOUT BEHAVIORAL DISTURBANCE: ICD-10-CM

## 2021-03-17 DIAGNOSIS — J41.8 MIXED SIMPLE AND MUCOPURULENT CHRONIC BRONCHITIS: ICD-10-CM

## 2021-03-17 DIAGNOSIS — F32.A ANXIETY AND DEPRESSION: Primary | ICD-10-CM

## 2021-03-17 DIAGNOSIS — N18.32 STAGE 3B CHRONIC KIDNEY DISEASE: ICD-10-CM

## 2021-03-17 DIAGNOSIS — R41.3 MEMORY LOSS: ICD-10-CM

## 2021-03-17 DIAGNOSIS — I70.0 ABDOMINAL AORTIC ATHEROSCLEROSIS: ICD-10-CM

## 2021-03-17 DIAGNOSIS — M81.0 AGE-RELATED OSTEOPOROSIS WITHOUT CURRENT PATHOLOGICAL FRACTURE: ICD-10-CM

## 2021-03-17 PROBLEM — H54.40 BLINDNESS OF LEFT EYE: Status: ACTIVE | Noted: 2021-03-17

## 2021-03-17 PROBLEM — M79.674 TOE PAIN, RIGHT: Status: RESOLVED | Noted: 2019-03-18 | Resolved: 2021-03-17

## 2021-03-17 PROCEDURE — 99204 OFFICE O/P NEW MOD 45 MIN: CPT | Mod: S$GLB,,, | Performed by: PHYSICIAN ASSISTANT

## 2021-03-17 PROCEDURE — 99214 OFFICE O/P EST MOD 30 MIN: CPT | Mod: PBBFAC,PN | Performed by: PHYSICIAN ASSISTANT

## 2021-03-17 PROCEDURE — 99999 PR PBB SHADOW E&M-EST. PATIENT-LVL IV: ICD-10-PCS | Mod: PBBFAC,,, | Performed by: PHYSICIAN ASSISTANT

## 2021-03-17 PROCEDURE — 99204 PR OFFICE/OUTPT VISIT, NEW, LEVL IV, 45-59 MIN: ICD-10-PCS | Mod: S$GLB,,, | Performed by: PHYSICIAN ASSISTANT

## 2021-03-17 PROCEDURE — 99999 PR PBB SHADOW E&M-EST. PATIENT-LVL IV: CPT | Mod: PBBFAC,,, | Performed by: PHYSICIAN ASSISTANT

## 2021-03-17 RX ORDER — AMLODIPINE BESYLATE 2.5 MG/1
2.5 TABLET ORAL DAILY
COMMUNITY
End: 2021-06-29 | Stop reason: SDUPTHER

## 2021-03-17 RX ORDER — BUPROPION HYDROCHLORIDE 300 MG/1
300 TABLET ORAL DAILY
Qty: 90 TABLET | Refills: 1 | Status: SHIPPED | OUTPATIENT
Start: 2021-03-17 | End: 2021-06-29 | Stop reason: SDUPTHER

## 2021-03-17 RX ORDER — DONEPEZIL HYDROCHLORIDE 10 MG/1
10 TABLET, FILM COATED ORAL NIGHTLY
COMMUNITY
End: 2021-06-29 | Stop reason: SDUPTHER

## 2021-03-17 RX ORDER — IPRATROPIUM BROMIDE AND ALBUTEROL SULFATE 2.5; .5 MG/3ML; MG/3ML
3 SOLUTION RESPIRATORY (INHALATION)
Qty: 3 BOX | Refills: 11 | Status: SHIPPED | OUTPATIENT
Start: 2021-03-17 | End: 2024-01-24

## 2021-03-18 ENCOUNTER — PATIENT OUTREACH (OUTPATIENT)
Dept: ADMINISTRATIVE | Facility: HOSPITAL | Age: 86
End: 2021-03-18

## 2021-06-29 ENCOUNTER — OFFICE VISIT (OUTPATIENT)
Dept: FAMILY MEDICINE | Facility: CLINIC | Age: 86
End: 2021-06-29
Payer: MEDICARE

## 2021-06-29 VITALS
OXYGEN SATURATION: 96 % | DIASTOLIC BLOOD PRESSURE: 70 MMHG | TEMPERATURE: 98 F | WEIGHT: 118.81 LBS | HEIGHT: 63 IN | SYSTOLIC BLOOD PRESSURE: 136 MMHG | HEART RATE: 75 BPM | BODY MASS INDEX: 21.05 KG/M2

## 2021-06-29 DIAGNOSIS — F01.50 VASCULAR DEMENTIA WITHOUT BEHAVIORAL DISTURBANCE: ICD-10-CM

## 2021-06-29 DIAGNOSIS — R63.4 WEIGHT LOSS: ICD-10-CM

## 2021-06-29 DIAGNOSIS — R73.03 PRE-DIABETES: ICD-10-CM

## 2021-06-29 DIAGNOSIS — F41.9 ANXIETY AND DEPRESSION: ICD-10-CM

## 2021-06-29 DIAGNOSIS — F32.A ANXIETY AND DEPRESSION: ICD-10-CM

## 2021-06-29 DIAGNOSIS — E78.2 MIXED HYPERLIPIDEMIA: ICD-10-CM

## 2021-06-29 DIAGNOSIS — I10 ESSENTIAL HYPERTENSION: Primary | ICD-10-CM

## 2021-06-29 DIAGNOSIS — Z72.0 TOBACCO ABUSE: ICD-10-CM

## 2021-06-29 DIAGNOSIS — K21.9 GASTROESOPHAGEAL REFLUX DISEASE WITHOUT ESOPHAGITIS: ICD-10-CM

## 2021-06-29 PROCEDURE — 3288F FALL RISK ASSESSMENT DOCD: CPT | Mod: S$GLB,,, | Performed by: PHYSICIAN ASSISTANT

## 2021-06-29 PROCEDURE — 3288F PR FALLS RISK ASSESSMENT DOCUMENTED: ICD-10-PCS | Mod: S$GLB,,, | Performed by: PHYSICIAN ASSISTANT

## 2021-06-29 PROCEDURE — 1126F PR PAIN SEVERITY QUANTIFIED, NO PAIN PRESENT: ICD-10-PCS | Mod: S$GLB,,, | Performed by: PHYSICIAN ASSISTANT

## 2021-06-29 PROCEDURE — 1126F AMNT PAIN NOTED NONE PRSNT: CPT | Mod: S$GLB,,, | Performed by: PHYSICIAN ASSISTANT

## 2021-06-29 PROCEDURE — 1101F PT FALLS ASSESS-DOCD LE1/YR: CPT | Mod: S$GLB,,, | Performed by: PHYSICIAN ASSISTANT

## 2021-06-29 PROCEDURE — 99999 PR PBB SHADOW E&M-EST. PATIENT-LVL IV: CPT | Mod: PBBFAC,,, | Performed by: PHYSICIAN ASSISTANT

## 2021-06-29 PROCEDURE — 1159F MED LIST DOCD IN RCRD: CPT | Mod: S$GLB,,, | Performed by: PHYSICIAN ASSISTANT

## 2021-06-29 PROCEDURE — 99214 PR OFFICE/OUTPT VISIT, EST, LEVL IV, 30-39 MIN: ICD-10-PCS | Mod: S$GLB,,, | Performed by: PHYSICIAN ASSISTANT

## 2021-06-29 PROCEDURE — 99214 OFFICE O/P EST MOD 30 MIN: CPT | Mod: S$GLB,,, | Performed by: PHYSICIAN ASSISTANT

## 2021-06-29 PROCEDURE — 1101F PR PT FALLS ASSESS DOC 0-1 FALLS W/OUT INJ PAST YR: ICD-10-PCS | Mod: S$GLB,,, | Performed by: PHYSICIAN ASSISTANT

## 2021-06-29 PROCEDURE — 99999 PR PBB SHADOW E&M-EST. PATIENT-LVL IV: ICD-10-PCS | Mod: PBBFAC,,, | Performed by: PHYSICIAN ASSISTANT

## 2021-06-29 PROCEDURE — 1159F PR MEDICATION LIST DOCUMENTED IN MEDICAL RECORD: ICD-10-PCS | Mod: S$GLB,,, | Performed by: PHYSICIAN ASSISTANT

## 2021-06-29 RX ORDER — BUPROPION HYDROCHLORIDE 300 MG/1
300 TABLET ORAL DAILY
Qty: 90 TABLET | Refills: 3 | Status: SHIPPED | OUTPATIENT
Start: 2021-06-29 | End: 2022-06-15

## 2021-06-29 RX ORDER — LOSARTAN POTASSIUM 100 MG/1
100 TABLET ORAL DAILY
Qty: 90 TABLET | Refills: 3 | Status: SHIPPED | OUTPATIENT
Start: 2021-06-29 | End: 2022-03-23

## 2021-06-29 RX ORDER — CALCIUM CARBONATE 500(1250)
2 TABLET,CHEWABLE ORAL DAILY
COMMUNITY

## 2021-06-29 RX ORDER — LOVASTATIN 20 MG/1
20 TABLET ORAL NIGHTLY
Qty: 90 TABLET | Refills: 3 | Status: SHIPPED | OUTPATIENT
Start: 2021-06-29 | End: 2022-06-15

## 2021-06-29 RX ORDER — OMEPRAZOLE 20 MG/1
20 CAPSULE, DELAYED RELEASE ORAL DAILY
Qty: 90 CAPSULE | Refills: 3 | Status: SHIPPED | OUTPATIENT
Start: 2021-06-29 | End: 2022-06-15

## 2021-06-29 RX ORDER — DONEPEZIL HYDROCHLORIDE 10 MG/1
10 TABLET, FILM COATED ORAL NIGHTLY
Qty: 90 TABLET | Refills: 3 | Status: SHIPPED | OUTPATIENT
Start: 2021-06-29 | End: 2022-06-15

## 2021-06-29 RX ORDER — AMLODIPINE BESYLATE 2.5 MG/1
2.5 TABLET ORAL DAILY
Qty: 90 TABLET | Refills: 3 | Status: SHIPPED | OUTPATIENT
Start: 2021-06-29 | End: 2022-06-15

## 2021-09-16 ENCOUNTER — OFFICE VISIT (OUTPATIENT)
Dept: FAMILY MEDICINE | Facility: CLINIC | Age: 86
End: 2021-09-16
Payer: MEDICARE

## 2021-09-16 VITALS
OXYGEN SATURATION: 97 % | WEIGHT: 119.69 LBS | SYSTOLIC BLOOD PRESSURE: 120 MMHG | DIASTOLIC BLOOD PRESSURE: 70 MMHG | BODY MASS INDEX: 21.21 KG/M2 | HEART RATE: 69 BPM | TEMPERATURE: 98 F | HEIGHT: 63 IN

## 2021-09-16 DIAGNOSIS — E78.2 MIXED HYPERLIPIDEMIA: ICD-10-CM

## 2021-09-16 DIAGNOSIS — I10 ESSENTIAL HYPERTENSION: Primary | ICD-10-CM

## 2021-09-16 DIAGNOSIS — R41.3 MEMORY LOSS: ICD-10-CM

## 2021-09-16 LAB
ALBUMIN SERPL-MCNC: 4 G/DL (ref 3.6–5.1)
ALBUMIN/GLOB SERPL: 1.3 (CALC) (ref 1–2.5)
ALP SERPL-CCNC: 51 U/L (ref 37–153)
ALT SERPL-CCNC: 5 U/L (ref 6–29)
AST SERPL-CCNC: 12 U/L (ref 10–35)
BASOPHILS # BLD AUTO: 52 CELLS/UL (ref 0–200)
BASOPHILS NFR BLD AUTO: 0.9 %
BILIRUB SERPL-MCNC: 0.5 MG/DL (ref 0.2–1.2)
BUN SERPL-MCNC: 22 MG/DL (ref 7–25)
BUN/CREAT SERPL: 22 (CALC) (ref 6–22)
CALCIUM SERPL-MCNC: 9.4 MG/DL (ref 8.6–10.4)
CHLORIDE SERPL-SCNC: 100 MMOL/L (ref 98–110)
CHOLEST SERPL-MCNC: 221 MG/DL
CHOLEST/HDLC SERPL: 3.3 (CALC)
CO2 SERPL-SCNC: 30 MMOL/L (ref 20–32)
CREAT SERPL-MCNC: 1.02 MG/DL (ref 0.6–0.88)
EOSINOPHIL # BLD AUTO: 52 CELLS/UL (ref 15–500)
EOSINOPHIL NFR BLD AUTO: 0.9 %
ERYTHROCYTE [DISTWIDTH] IN BLOOD BY AUTOMATED COUNT: 12 % (ref 11–15)
GLOBULIN SER CALC-MCNC: 3.2 G/DL (CALC) (ref 1.9–3.7)
GLUCOSE SERPL-MCNC: 123 MG/DL (ref 65–99)
HCT VFR BLD AUTO: 41.2 % (ref 35–45)
HDLC SERPL-MCNC: 68 MG/DL
HGB BLD-MCNC: 14.2 G/DL (ref 11.7–15.5)
LDLC SERPL CALC-MCNC: 125 MG/DL (CALC)
LYMPHOCYTES # BLD AUTO: 1380 CELLS/UL (ref 850–3900)
LYMPHOCYTES NFR BLD AUTO: 23.8 %
MCH RBC QN AUTO: 32.2 PG (ref 27–33)
MCHC RBC AUTO-ENTMCNC: 34.5 G/DL (ref 32–36)
MCV RBC AUTO: 93.4 FL (ref 80–100)
MONOCYTES # BLD AUTO: 435 CELLS/UL (ref 200–950)
MONOCYTES NFR BLD AUTO: 7.5 %
NEUTROPHILS # BLD AUTO: 3880 CELLS/UL (ref 1500–7800)
NEUTROPHILS NFR BLD AUTO: 66.9 %
NONHDLC SERPL-MCNC: 153 MG/DL (CALC)
PLATELET # BLD AUTO: 258 THOUSAND/UL (ref 140–400)
PMV BLD REES-ECKER: 11.9 FL (ref 7.5–12.5)
POTASSIUM SERPL-SCNC: 4.5 MMOL/L (ref 3.5–5.3)
PROT SERPL-MCNC: 7.2 G/DL (ref 6.1–8.1)
RBC # BLD AUTO: 4.41 MILLION/UL (ref 3.8–5.1)
SODIUM SERPL-SCNC: 138 MMOL/L (ref 135–146)
TRIGL SERPL-MCNC: 166 MG/DL
WBC # BLD AUTO: 5.8 THOUSAND/UL (ref 3.8–10.8)

## 2021-09-16 PROCEDURE — 1160F PR REVIEW ALL MEDS BY PRESCRIBER/CLIN PHARMACIST DOCUMENTED: ICD-10-PCS | Mod: S$GLB,,, | Performed by: FAMILY MEDICINE

## 2021-09-16 PROCEDURE — 1126F AMNT PAIN NOTED NONE PRSNT: CPT | Mod: S$GLB,,, | Performed by: FAMILY MEDICINE

## 2021-09-16 PROCEDURE — 1126F PR PAIN SEVERITY QUANTIFIED, NO PAIN PRESENT: ICD-10-PCS | Mod: S$GLB,,, | Performed by: FAMILY MEDICINE

## 2021-09-16 PROCEDURE — 1101F PT FALLS ASSESS-DOCD LE1/YR: CPT | Mod: S$GLB,,, | Performed by: FAMILY MEDICINE

## 2021-09-16 PROCEDURE — 3288F PR FALLS RISK ASSESSMENT DOCUMENTED: ICD-10-PCS | Mod: S$GLB,,, | Performed by: FAMILY MEDICINE

## 2021-09-16 PROCEDURE — 1159F MED LIST DOCD IN RCRD: CPT | Mod: S$GLB,,, | Performed by: FAMILY MEDICINE

## 2021-09-16 PROCEDURE — 1160F RVW MEDS BY RX/DR IN RCRD: CPT | Mod: S$GLB,,, | Performed by: FAMILY MEDICINE

## 2021-09-16 PROCEDURE — 1159F PR MEDICATION LIST DOCUMENTED IN MEDICAL RECORD: ICD-10-PCS | Mod: S$GLB,,, | Performed by: FAMILY MEDICINE

## 2021-09-16 PROCEDURE — 99214 OFFICE O/P EST MOD 30 MIN: CPT | Mod: S$GLB,,, | Performed by: FAMILY MEDICINE

## 2021-09-16 PROCEDURE — 99999 PR PBB SHADOW E&M-EST. PATIENT-LVL III: CPT | Mod: PBBFAC,,, | Performed by: FAMILY MEDICINE

## 2021-09-16 PROCEDURE — 99214 PR OFFICE/OUTPT VISIT, EST, LEVL IV, 30-39 MIN: ICD-10-PCS | Mod: S$GLB,,, | Performed by: FAMILY MEDICINE

## 2021-09-16 PROCEDURE — 3288F FALL RISK ASSESSMENT DOCD: CPT | Mod: S$GLB,,, | Performed by: FAMILY MEDICINE

## 2021-09-16 PROCEDURE — 99999 PR PBB SHADOW E&M-EST. PATIENT-LVL III: ICD-10-PCS | Mod: PBBFAC,,, | Performed by: FAMILY MEDICINE

## 2021-09-16 PROCEDURE — 1101F PR PT FALLS ASSESS DOC 0-1 FALLS W/OUT INJ PAST YR: ICD-10-PCS | Mod: S$GLB,,, | Performed by: FAMILY MEDICINE

## 2021-10-25 ENCOUNTER — OFFICE VISIT (OUTPATIENT)
Dept: FAMILY MEDICINE | Facility: CLINIC | Age: 86
End: 2021-10-25
Payer: MEDICARE

## 2021-10-25 VITALS
TEMPERATURE: 98 F | WEIGHT: 121.94 LBS | BODY MASS INDEX: 21.61 KG/M2 | HEART RATE: 73 BPM | DIASTOLIC BLOOD PRESSURE: 74 MMHG | SYSTOLIC BLOOD PRESSURE: 126 MMHG | HEIGHT: 63 IN | OXYGEN SATURATION: 95 %

## 2021-10-25 DIAGNOSIS — R41.3 MEMORY LOSS: ICD-10-CM

## 2021-10-25 DIAGNOSIS — E78.2 MIXED HYPERLIPIDEMIA: ICD-10-CM

## 2021-10-25 DIAGNOSIS — F01.50 VASCULAR DEMENTIA WITHOUT BEHAVIORAL DISTURBANCE: ICD-10-CM

## 2021-10-25 DIAGNOSIS — F41.9 ANXIETY AND DEPRESSION: ICD-10-CM

## 2021-10-25 DIAGNOSIS — I10 ESSENTIAL HYPERTENSION: Primary | ICD-10-CM

## 2021-10-25 DIAGNOSIS — F32.A ANXIETY AND DEPRESSION: ICD-10-CM

## 2021-10-25 DIAGNOSIS — R11.2 NAUSEA AND VOMITING, INTRACTABILITY OF VOMITING NOT SPECIFIED, UNSPECIFIED VOMITING TYPE: ICD-10-CM

## 2021-10-25 PROCEDURE — 99214 PR OFFICE/OUTPT VISIT, EST, LEVL IV, 30-39 MIN: ICD-10-PCS | Mod: S$GLB,,, | Performed by: NURSE PRACTITIONER

## 2021-10-25 PROCEDURE — 99214 OFFICE O/P EST MOD 30 MIN: CPT | Mod: S$GLB,,, | Performed by: NURSE PRACTITIONER

## 2021-10-25 PROCEDURE — 1159F PR MEDICATION LIST DOCUMENTED IN MEDICAL RECORD: ICD-10-PCS | Mod: S$GLB,,, | Performed by: NURSE PRACTITIONER

## 2021-10-25 PROCEDURE — 1101F PR PT FALLS ASSESS DOC 0-1 FALLS W/OUT INJ PAST YR: ICD-10-PCS | Mod: S$GLB,,, | Performed by: NURSE PRACTITIONER

## 2021-10-25 PROCEDURE — 3288F PR FALLS RISK ASSESSMENT DOCUMENTED: ICD-10-PCS | Mod: S$GLB,,, | Performed by: NURSE PRACTITIONER

## 2021-10-25 PROCEDURE — 99999 PR PBB SHADOW E&M-EST. PATIENT-LVL IV: ICD-10-PCS | Mod: PBBFAC,,, | Performed by: NURSE PRACTITIONER

## 2021-10-25 PROCEDURE — 1160F RVW MEDS BY RX/DR IN RCRD: CPT | Mod: S$GLB,,, | Performed by: NURSE PRACTITIONER

## 2021-10-25 PROCEDURE — 1101F PT FALLS ASSESS-DOCD LE1/YR: CPT | Mod: S$GLB,,, | Performed by: NURSE PRACTITIONER

## 2021-10-25 PROCEDURE — 1159F MED LIST DOCD IN RCRD: CPT | Mod: S$GLB,,, | Performed by: NURSE PRACTITIONER

## 2021-10-25 PROCEDURE — 3288F FALL RISK ASSESSMENT DOCD: CPT | Mod: S$GLB,,, | Performed by: NURSE PRACTITIONER

## 2021-10-25 PROCEDURE — 1160F PR REVIEW ALL MEDS BY PRESCRIBER/CLIN PHARMACIST DOCUMENTED: ICD-10-PCS | Mod: S$GLB,,, | Performed by: NURSE PRACTITIONER

## 2021-10-25 PROCEDURE — 1126F AMNT PAIN NOTED NONE PRSNT: CPT | Mod: S$GLB,,, | Performed by: NURSE PRACTITIONER

## 2021-10-25 PROCEDURE — 1126F PR PAIN SEVERITY QUANTIFIED, NO PAIN PRESENT: ICD-10-PCS | Mod: S$GLB,,, | Performed by: NURSE PRACTITIONER

## 2021-10-25 PROCEDURE — 99999 PR PBB SHADOW E&M-EST. PATIENT-LVL IV: CPT | Mod: PBBFAC,,, | Performed by: NURSE PRACTITIONER

## 2021-10-25 RX ORDER — ONDANSETRON 4 MG/1
4 TABLET, ORALLY DISINTEGRATING ORAL EVERY 12 HOURS PRN
Qty: 20 TABLET | Refills: 0 | Status: SHIPPED | OUTPATIENT
Start: 2021-10-25 | End: 2023-04-12 | Stop reason: SDUPTHER

## 2022-03-23 DIAGNOSIS — I10 ESSENTIAL HYPERTENSION: ICD-10-CM

## 2022-03-23 RX ORDER — LOSARTAN POTASSIUM 100 MG/1
TABLET ORAL
Qty: 90 TABLET | Refills: 1 | Status: SHIPPED | OUTPATIENT
Start: 2022-03-23 | End: 2022-07-20 | Stop reason: SDUPTHER

## 2022-07-20 ENCOUNTER — OFFICE VISIT (OUTPATIENT)
Dept: FAMILY MEDICINE | Facility: CLINIC | Age: 87
End: 2022-07-20
Payer: MEDICARE

## 2022-07-20 VITALS
TEMPERATURE: 98 F | RESPIRATION RATE: 14 BRPM | OXYGEN SATURATION: 93 % | BODY MASS INDEX: 22.66 KG/M2 | WEIGHT: 127.88 LBS | HEIGHT: 63 IN | SYSTOLIC BLOOD PRESSURE: 128 MMHG | HEART RATE: 77 BPM | DIASTOLIC BLOOD PRESSURE: 76 MMHG

## 2022-07-20 DIAGNOSIS — H61.23 BILATERAL IMPACTED CERUMEN: ICD-10-CM

## 2022-07-20 DIAGNOSIS — E78.2 MIXED HYPERLIPIDEMIA: ICD-10-CM

## 2022-07-20 DIAGNOSIS — F01.50 VASCULAR DEMENTIA WITHOUT BEHAVIORAL DISTURBANCE: ICD-10-CM

## 2022-07-20 DIAGNOSIS — I70.0 ABDOMINAL AORTIC ATHEROSCLEROSIS: ICD-10-CM

## 2022-07-20 DIAGNOSIS — I10 ESSENTIAL HYPERTENSION: ICD-10-CM

## 2022-07-20 DIAGNOSIS — N18.32 STAGE 3B CHRONIC KIDNEY DISEASE: ICD-10-CM

## 2022-07-20 DIAGNOSIS — K21.9 GASTROESOPHAGEAL REFLUX DISEASE WITHOUT ESOPHAGITIS: ICD-10-CM

## 2022-07-20 DIAGNOSIS — K31.83 ACHLORHYDRIA: ICD-10-CM

## 2022-07-20 DIAGNOSIS — Z00.00 HEALTHCARE MAINTENANCE: Primary | ICD-10-CM

## 2022-07-20 DIAGNOSIS — J41.8 MIXED SIMPLE AND MUCOPURULENT CHRONIC BRONCHITIS: ICD-10-CM

## 2022-07-20 DIAGNOSIS — R79.9 ABNORMAL FINDING OF BLOOD CHEMISTRY, UNSPECIFIED: ICD-10-CM

## 2022-07-20 DIAGNOSIS — R53.83 FATIGUE, UNSPECIFIED TYPE: ICD-10-CM

## 2022-07-20 PROCEDURE — 1159F MED LIST DOCD IN RCRD: CPT | Mod: CPTII,S$GLB,, | Performed by: STUDENT IN AN ORGANIZED HEALTH CARE EDUCATION/TRAINING PROGRAM

## 2022-07-20 PROCEDURE — 99999 PR PBB SHADOW E&M-EST. PATIENT-LVL III: CPT | Mod: PBBFAC,,, | Performed by: STUDENT IN AN ORGANIZED HEALTH CARE EDUCATION/TRAINING PROGRAM

## 2022-07-20 PROCEDURE — 3288F PR FALLS RISK ASSESSMENT DOCUMENTED: ICD-10-PCS | Mod: CPTII,S$GLB,, | Performed by: STUDENT IN AN ORGANIZED HEALTH CARE EDUCATION/TRAINING PROGRAM

## 2022-07-20 PROCEDURE — 3288F FALL RISK ASSESSMENT DOCD: CPT | Mod: CPTII,S$GLB,, | Performed by: STUDENT IN AN ORGANIZED HEALTH CARE EDUCATION/TRAINING PROGRAM

## 2022-07-20 PROCEDURE — 1101F PT FALLS ASSESS-DOCD LE1/YR: CPT | Mod: CPTII,S$GLB,, | Performed by: STUDENT IN AN ORGANIZED HEALTH CARE EDUCATION/TRAINING PROGRAM

## 2022-07-20 PROCEDURE — 1101F PR PT FALLS ASSESS DOC 0-1 FALLS W/OUT INJ PAST YR: ICD-10-PCS | Mod: CPTII,S$GLB,, | Performed by: STUDENT IN AN ORGANIZED HEALTH CARE EDUCATION/TRAINING PROGRAM

## 2022-07-20 PROCEDURE — 99999 PR PBB SHADOW E&M-EST. PATIENT-LVL III: ICD-10-PCS | Mod: PBBFAC,,, | Performed by: STUDENT IN AN ORGANIZED HEALTH CARE EDUCATION/TRAINING PROGRAM

## 2022-07-20 PROCEDURE — 99214 OFFICE O/P EST MOD 30 MIN: CPT | Mod: S$GLB,,, | Performed by: STUDENT IN AN ORGANIZED HEALTH CARE EDUCATION/TRAINING PROGRAM

## 2022-07-20 PROCEDURE — 99214 PR OFFICE/OUTPT VISIT, EST, LEVL IV, 30-39 MIN: ICD-10-PCS | Mod: S$GLB,,, | Performed by: STUDENT IN AN ORGANIZED HEALTH CARE EDUCATION/TRAINING PROGRAM

## 2022-07-20 PROCEDURE — 1126F AMNT PAIN NOTED NONE PRSNT: CPT | Mod: CPTII,S$GLB,, | Performed by: STUDENT IN AN ORGANIZED HEALTH CARE EDUCATION/TRAINING PROGRAM

## 2022-07-20 PROCEDURE — 1159F PR MEDICATION LIST DOCUMENTED IN MEDICAL RECORD: ICD-10-PCS | Mod: CPTII,S$GLB,, | Performed by: STUDENT IN AN ORGANIZED HEALTH CARE EDUCATION/TRAINING PROGRAM

## 2022-07-20 PROCEDURE — 1126F PR PAIN SEVERITY QUANTIFIED, NO PAIN PRESENT: ICD-10-PCS | Mod: CPTII,S$GLB,, | Performed by: STUDENT IN AN ORGANIZED HEALTH CARE EDUCATION/TRAINING PROGRAM

## 2022-07-20 RX ORDER — LOSARTAN POTASSIUM 100 MG/1
100 TABLET ORAL DAILY
Qty: 90 TABLET | Refills: 3 | Status: SHIPPED | OUTPATIENT
Start: 2022-07-20 | End: 2023-04-12 | Stop reason: SDUPTHER

## 2022-07-20 NOTE — ADDENDUM NOTE
Addended by: KAYLEY MUNGUIA on: 7/20/2022 03:30 PM     Modules accepted: Orders, Level of Service

## 2022-07-20 NOTE — PROGRESS NOTES
[facility]    Subjective:    The HPI and pertinent ROS is included in the Diagnostic Impression Remarks section at the end of the note. Please see below for further details. Chief complaint is at end of note.     Medication List with Changes/Refills   Current Medications    ALBUTEROL (ACCUNEB) 1.25 MG/3 ML NEBU    Take 1.25 mg by nebulization every 6 (six) hours as needed.    ALBUTEROL-IPRATROPIUM (DUO-NEB) 2.5 MG-0.5 MG/3 ML NEBULIZER SOLUTION    Take 3 mLs by nebulization every 6 (six) hours while awake. Rescue    AMLODIPINE (NORVASC) 2.5 MG TABLET    TAKE 1 TABLET(2.5 MG) BY MOUTH EVERY DAY    ASCORBIC ACID, VITAMIN C, (VITAMIN C) 500 MG TABLET    Take 500 mg by mouth once daily.    BUPROPION (WELLBUTRIN XL) 300 MG 24 HR TABLET    TAKE 1 TABLET(300 MG) BY MOUTH EVERY DAY    CALCIUM CARBONATE (OS-KATELYN) 500 MG CALCIUM (1,250 MG) CHEWABLE TABLET    Take 2 tablets by mouth once daily. 300 mg gummy chewable    LOVASTATIN (MEVACOR) 20 MG TABLET    TAKE 1 TABLET(20 MG) BY MOUTH EVERY EVENING    MULTIVITAMIN CAPSULE    Take 1 capsule by mouth once daily.    OMEPRAZOLE (PRILOSEC) 20 MG CAPSULE    TAKE 1 CAPSULE(20 MG) BY MOUTH EVERY DAY    ONDANSETRON (ZOFRAN-ODT) 4 MG TBDL    Take 1 tablet (4 mg total) by mouth every 12 (twelve) hours as needed (N/V).    UNABLE TO FIND    Take 1,053 mg by mouth once daily. Brain support    VIT A/VIT C/VIT E/ZINC/COPPER (OCUVITE PRESERVISION ORAL)    Take 2 tablets by mouth once daily.   Changed and/or Refilled Medications    Modified Medication Previous Medication    LOSARTAN (COZAAR) 100 MG TABLET losartan (COZAAR) 100 MG tablet       Take 1 tablet (100 mg total) by mouth once daily.    TAKE 1 TABLET(100 MG) BY MOUTH EVERY DAY   Discontinued Medications    DONEPEZIL (ARICEPT) 10 MG TABLET    TAKE 1 TABLET(10 MG) BY MOUTH EVERY EVENING     Modified Medications    Modified Medication Previous Medication    LOSARTAN (COZAAR) 100 MG TABLET losartan (COZAAR) 100 MG  tablet       Take 1 tablet (100 mg total) by mouth once daily.    TAKE 1 TABLET(100 MG) BY MOUTH EVERY DAY       Data reviewed 274}  Previous medical records reviewed and summarized in plan section at end of note.      If you are due for any health screening(s) below please notify me so we can arrange them to be ordered and scheduled to maintain your health.     Health Maintenance Due   Topic Date Due    TETANUS VACCINE  Never done    Shingles Vaccine (2 of 3) 01/10/2012    COVID-19 Vaccine (3 - Booster for Moderna series) 09/07/2021       The following portions of the patient's history were reviewed and updated as appropriate: allergies, current medications, past family history, past medical history, past social history, past surgical history and problem list.    She  has a past medical history of Cataract, COPD (chronic obstructive pulmonary disease), GERD (gastroesophageal reflux disease), Hyperlipidemia, Hypertension, Osteoporosis, and Thyroid disease.  She  has a past surgical history that includes Thyroidectomy and Bunionectomy.    She  reports that she has been smoking cigarettes. She has a 16.00 pack-year smoking history. She has never used smokeless tobacco. She reports current alcohol use of about 5.0 standard drinks of alcohol per week. She reports that she does not use drugs.  She family history is not on file.    Review of patient's allergies indicates:  No Known Allergies    Tobacco Use: High Risk    Smoking Tobacco Use: Current Every Day Smoker    Smokeless Tobacco Use: Never Used     Physical Examination  General appearance: alert, cooperative, no distress  Neck: no thyromegaly, no neck stiffness  Lungs: clear to auscultation, no wheezes, rales or rhonchi, symmetric air entry  Heart: normal rate, regular rhythm, normal S1, S2, no murmurs, rubs, clicks or gallops  Abdomen: soft, nontender, nondistended, no rigidity, rebound, or guarding.   Back: no point tenderness over spine  Extremities:  "peripheral pulses normal, no unilateral leg swelling or calf tenderness   Neurological:alert, oriented, normal speech, no new focal findings or movement disorder noted from baseline    BP Readings from Last 3 Encounters:   07/20/22 128/76   10/25/21 126/74   09/16/21 120/70     Wt Readings from Last 3 Encounters:   07/20/22 58 kg (127 lb 13.9 oz)   10/25/21 55.3 kg (121 lb 14.6 oz)   09/16/21 54.3 kg (119 lb 11.4 oz)     /76 (BP Location: Right arm, Patient Position: Sitting, BP Method: Medium (Manual))   Pulse 77   Temp 98.1 °F (36.7 °C) (Oral)   Resp 14   Ht 5' 3" (1.6 m)   Wt 58 kg (127 lb 13.9 oz)   SpO2 (!) 93%   BMI 22.65 kg/m²    274}  Laboratory: I have reviewed old labs below:    274}    Lab Results   Component Value Date    WBC 5.8 09/15/2021    HGB 14.2 09/15/2021    HCT 41.2 09/15/2021    MCV 93.4 09/15/2021     09/15/2021     09/15/2021    K 4.5 09/15/2021     09/15/2021    CALCIUM 9.4 09/15/2021    CO2 30 09/15/2021     (H) 09/15/2021    BUN 22 09/15/2021    CREATININE 1.02 (H) 09/15/2021    ANIONGAP 11 11/14/2019    ESTGFRAFRICA 56 (L) 09/15/2021    EGFRNONAA 49 (L) 09/15/2021    PROT 7.2 09/15/2021    ALBUMIN 4.0 09/15/2021    BILITOT 0.5 09/15/2021    ALKPHOS 49 (L) 11/14/2019    ALT 5 (L) 09/15/2021    AST 12 09/15/2021    CHOL 221 (H) 09/15/2021    TRIG 166 (H) 09/15/2021    HDL 68 09/15/2021    LDLCALC 125 (H) 09/15/2021    HGBA1C 5.5 11/14/2019     Lab reviewed by me: Particular labs of significance that I will monitor, workup, or treat to improve are mentioned below in diagnostic impression remarks.    Imaging/EKG: I have reviewed the pertinent results and my findings are noted in remarks.  274}    CC:   Chief Complaint   Patient presents with    [facility]    Hypertension        274}    Assessment/Plan  Marietta Gates is a 89 y.o. female who presents to clinic with:  1. Healthcare maintenance    2. Essential hypertension    3. " "Gastroesophageal reflux disease without esophagitis    4. Mixed hyperlipidemia    5. Stage 3b chronic kidney disease    6. Abdominal aortic atherosclerosis    7. Mixed simple and mucopurulent chronic bronchitis    8. Vascular dementia without behavioral disturbance    9. Essential hypertension    10. Abnormal finding of blood chemistry, unspecified    11. Achlorhydria    12. Fatigue, unspecified type    13. Abnormal finding of blood chemistry, unspecified     14. Bilateral impacted cerumen       274}  Diagnostic Impression Remarks + HPI     Documentation entered by me for this encounter may have been done in part using speech-recognition technology. Although I have made an effort to ensure accuracy, "sound like" errors may exist and should be interpreted in context.      Health maintenance-patient's caregiver is here today patient has severe memory issues this is chronic not new and her family members taking care for and keeping a close eye on her no acute issues currently   Memory loss-patient has severe memory loss is not new no acute neurological deficits will check a B12 folate and TSH to make sure none of these are abnormal as well   Hypertension well controlled continue current meds   Nicotine dependence-needs improvement patient is not want to quit and her caregivers opened with her continuing to smoke  Dementia-stable she has not tolerate donepezil due to nausea upset stomach low blood pressure and other side effects knee held it and she is doing fine will discontinue  Bronchitis-stable no shortness of breath recommend smoking cessation  Atherosclerosis of aorta- stable continue statin and rec healthy diet monitor follow lipid levels     The patient's chronic kidney disease stage 3 was monitored, evaluated, addressed and/or treated.    Regarding CKD, the patient was educated on etiology (including infections, diabetes, high blood pressure, kidney stones, circulation problems, and reactions to " "medications) and the importance on making healthy changes and complying with treatment plan to prevent kidney failure.   Patient was advised to limit sodium intake by:  eating 1,500 mg or less of sodium daily; limiting processed foods (i.e., frozen dinners and packaged meals, canned fish and meats, pickled foods, salted snacks, lunch meats, most cheeses, and fast foods); not adding salt to food while cooking or before eating at the table; eating unprocessed foods to lower the sodium intake (i.e., fresh turkey and chicken, lean beef, unsalted tuna, fresh fish, and fresh vegetables and fruits); seasoning foods with fresh herbs, garlic, onions, citrus, flavored vinegar, and sodium-free spice blends instead of salt when cooking;  avoiding drinking "softened" water, because of the sodium content; and avoiding over-the-counter medicines that contain sodium bicarbonate or sodium carbonate.  Patient was also instructed to: avoid becoming overly fatigued; getting plenty of rest and more sleep at night; moving around and bending legs to avoid getting blood clots when resting for long periods of time; taking medications as directed; keeping all medical appointments; taking steps to control high blood pressure and diabetes; and by obtaining a daily weight and keeping a record of those weights for healthcare provider to review.  The patient was instructed to contact primary care provider or seek medical care at the closest emergency department if they experience: difficulty eating or drinking; weight loss of more than 2 pounds in 24 hours or more than 5 pounds in 7 days; little or no urine output; trouble breathing; muscle aches;  fever of 100.4°F or higher, or chills; blood in urine or stool; bloody discharge from the nose, mouth, or ears; severe headache or a seizure; vomiting; swelling of legs or ankles, and/or chest pain.    This is the extent of the patient's concerns at this present time. She did not feel chest pain upon " exertion, dyspnea, nausea, vomiting, diaphoresis, or syncope. No pleuritic chest pain, unilateral leg swelling, calf tenderness, or calf pain. Marietta will return to clinic in a few months for further workup and reassessment or sooner as needed. She was instructed to call the clinic or go to the emergency department if her symptoms do not improve, worsens, or if new symptoms develop. As we discussed that symptoms could worsen over the next 24 hours she was advised that if any increased swelling, pain, or numbness arise to go immediately to the ED. Patient knows to call any time if an emergency arises. Shared decision making occurred and she verbalized understanding in agreement with this plan. I discussed imaging findings, diagnosis, possibilities, treatment options, medications, risks, and benefits. She had many questions regarding the options and long-term effects. All questions were answered. She expressed understanding after counseling regarding the diagnosis and recommendations. She was capable and demonstrated competence with understanding of these options. Shared decision making was performed resulting in her choosing the current treatment plan. Patient handout was given with instructions and recommendations. Advised the patient that if they become pregnant to alert us immediately to assess for medication changes. I also discussed the importance of close follow up to discuss labs, change or modify her medications if needed, monitor side effects, and further evaluation of medical problems.     Additional workup planned: see labs ordered below.    See below for labs and meds ordered with associated diagnosis      1. Healthcare maintenance  - CBC Auto Differential; Future    2. Essential hypertension  - losartan (COZAAR) 100 MG tablet; Take 1 tablet (100 mg total) by mouth once daily.  Dispense: 90 tablet; Refill: 3  - Comprehensive Metabolic Panel; Future    3. Gastroesophageal reflux disease without  esophagitis    4. Mixed hyperlipidemia    5. Stage 3b chronic kidney disease    6. Abdominal aortic atherosclerosis    7. Mixed simple and mucopurulent chronic bronchitis    8. Vascular dementia without behavioral disturbance  - TSH; Future  - Vitamin B12 Deficiency Panel; Future  - Folate; Future    9. Essential hypertension  - losartan (COZAAR) 100 MG tablet; Take 1 tablet (100 mg total) by mouth once daily.  Dispense: 90 tablet; Refill: 3  - Comprehensive Metabolic Panel; Future    10. Abnormal finding of blood chemistry, unspecified  - CBC Auto Differential; Future  - Comprehensive Metabolic Panel; Future  - Hemoglobin A1C; Future  - TSH; Future  - Lipid Panel; Future  - Ferritin; Future    11. Achlorhydria  - Vitamin B12 Deficiency Panel; Future  - Folate; Future    12. Fatigue, unspecified type  - TSH; Future  - Ferritin; Future    13. Abnormal finding of blood chemistry, unspecified   - CBC Auto Differential; Future  - Comprehensive Metabolic Panel; Future  - Hemoglobin A1C; Future  - TSH; Future  - Lipid Panel; Future  - Ferritin; Future    14. Bilateral impacted cerumen  - Ambulatory referral/consult to ENT; Future      Ollie Bartlett MD   274}  07/20/2022

## 2022-07-28 ENCOUNTER — LAB VISIT (OUTPATIENT)
Dept: LAB | Facility: HOSPITAL | Age: 87
End: 2022-07-28
Attending: STUDENT IN AN ORGANIZED HEALTH CARE EDUCATION/TRAINING PROGRAM
Payer: MEDICARE

## 2022-07-28 ENCOUNTER — OFFICE VISIT (OUTPATIENT)
Dept: OTOLARYNGOLOGY | Facility: CLINIC | Age: 87
End: 2022-07-28
Payer: MEDICARE

## 2022-07-28 VITALS — WEIGHT: 128.31 LBS | BODY MASS INDEX: 22.73 KG/M2 | TEMPERATURE: 98 F | HEIGHT: 63 IN

## 2022-07-28 DIAGNOSIS — I10 ESSENTIAL HYPERTENSION: ICD-10-CM

## 2022-07-28 DIAGNOSIS — Z00.00 HEALTHCARE MAINTENANCE: ICD-10-CM

## 2022-07-28 DIAGNOSIS — R79.9 ABNORMAL FINDING OF BLOOD CHEMISTRY, UNSPECIFIED: ICD-10-CM

## 2022-07-28 DIAGNOSIS — F01.50 VASCULAR DEMENTIA WITHOUT BEHAVIORAL DISTURBANCE: ICD-10-CM

## 2022-07-28 DIAGNOSIS — R53.83 FATIGUE, UNSPECIFIED TYPE: ICD-10-CM

## 2022-07-28 DIAGNOSIS — H91.93 BILATERAL HEARING LOSS, UNSPECIFIED HEARING LOSS TYPE: Primary | ICD-10-CM

## 2022-07-28 DIAGNOSIS — H61.23 BILATERAL IMPACTED CERUMEN: ICD-10-CM

## 2022-07-28 DIAGNOSIS — K31.83 ACHLORHYDRIA: ICD-10-CM

## 2022-07-28 LAB
ALBUMIN SERPL BCP-MCNC: 3.7 G/DL (ref 3.5–5.2)
ALP SERPL-CCNC: 65 U/L (ref 55–135)
ALT SERPL W/O P-5'-P-CCNC: 8 U/L (ref 10–44)
ANION GAP SERPL CALC-SCNC: 12 MMOL/L (ref 8–16)
AST SERPL-CCNC: 15 U/L (ref 10–40)
BASOPHILS # BLD AUTO: 0.07 K/UL (ref 0–0.2)
BASOPHILS NFR BLD: 0.9 % (ref 0–1.9)
BILIRUB SERPL-MCNC: 0.6 MG/DL (ref 0.1–1)
BUN SERPL-MCNC: 21 MG/DL (ref 8–23)
CALCIUM SERPL-MCNC: 9.4 MG/DL (ref 8.7–10.5)
CHLORIDE SERPL-SCNC: 100 MMOL/L (ref 95–110)
CHOLEST SERPL-MCNC: 227 MG/DL (ref 120–199)
CHOLEST/HDLC SERPL: 3.4 {RATIO} (ref 2–5)
CO2 SERPL-SCNC: 25 MMOL/L (ref 23–29)
CREAT SERPL-MCNC: 1.2 MG/DL (ref 0.5–1.4)
DIFFERENTIAL METHOD: ABNORMAL
EOSINOPHIL # BLD AUTO: 0 K/UL (ref 0–0.5)
EOSINOPHIL NFR BLD: 0.5 % (ref 0–8)
ERYTHROCYTE [DISTWIDTH] IN BLOOD BY AUTOMATED COUNT: 13.6 % (ref 11.5–14.5)
EST. GFR  (AFRICAN AMERICAN): 46 ML/MIN/1.73 M^2
EST. GFR  (NON AFRICAN AMERICAN): 39.9 ML/MIN/1.73 M^2
ESTIMATED AVG GLUCOSE: 97 MG/DL (ref 68–131)
FERRITIN SERPL-MCNC: 244 NG/ML (ref 20–300)
FOLATE SERPL-MCNC: 8.8 NG/ML (ref 4–24)
GLUCOSE SERPL-MCNC: 100 MG/DL (ref 70–110)
HBA1C MFR BLD: 5 % (ref 4–5.6)
HCT VFR BLD AUTO: 44.6 % (ref 37–48.5)
HDLC SERPL-MCNC: 66 MG/DL (ref 40–75)
HDLC SERPL: 29.1 % (ref 20–50)
HGB BLD-MCNC: 14.5 G/DL (ref 12–16)
IMM GRANULOCYTES # BLD AUTO: 0.04 K/UL (ref 0–0.04)
IMM GRANULOCYTES NFR BLD AUTO: 0.5 % (ref 0–0.5)
LDLC SERPL CALC-MCNC: 136 MG/DL (ref 63–159)
LYMPHOCYTES # BLD AUTO: 1 K/UL (ref 1–4.8)
LYMPHOCYTES NFR BLD: 12.6 % (ref 18–48)
MCH RBC QN AUTO: 31.6 PG (ref 27–31)
MCHC RBC AUTO-ENTMCNC: 32.5 G/DL (ref 32–36)
MCV RBC AUTO: 97 FL (ref 82–98)
MONOCYTES # BLD AUTO: 0.5 K/UL (ref 0.3–1)
MONOCYTES NFR BLD: 5.9 % (ref 4–15)
NEUTROPHILS # BLD AUTO: 6 K/UL (ref 1.8–7.7)
NEUTROPHILS NFR BLD: 79.6 % (ref 38–73)
NONHDLC SERPL-MCNC: 161 MG/DL
NRBC BLD-RTO: 0 /100 WBC
PLATELET # BLD AUTO: 286 K/UL (ref 150–450)
PMV BLD AUTO: 11.3 FL (ref 9.2–12.9)
POTASSIUM SERPL-SCNC: 4.4 MMOL/L (ref 3.5–5.1)
PROT SERPL-MCNC: 7.9 G/DL (ref 6–8.4)
RBC # BLD AUTO: 4.59 M/UL (ref 4–5.4)
SODIUM SERPL-SCNC: 137 MMOL/L (ref 136–145)
TRIGL SERPL-MCNC: 125 MG/DL (ref 30–150)
TSH SERPL DL<=0.005 MIU/L-ACNC: 2.62 UIU/ML (ref 0.4–4)
WBC # BLD AUTO: 7.6 K/UL (ref 3.9–12.7)

## 2022-07-28 PROCEDURE — 82746 ASSAY OF FOLIC ACID SERUM: CPT | Performed by: STUDENT IN AN ORGANIZED HEALTH CARE EDUCATION/TRAINING PROGRAM

## 2022-07-28 PROCEDURE — 83921 ORGANIC ACID SINGLE QUANT: CPT | Performed by: STUDENT IN AN ORGANIZED HEALTH CARE EDUCATION/TRAINING PROGRAM

## 2022-07-28 PROCEDURE — 80061 LIPID PANEL: CPT | Performed by: STUDENT IN AN ORGANIZED HEALTH CARE EDUCATION/TRAINING PROGRAM

## 2022-07-28 PROCEDURE — 1159F PR MEDICATION LIST DOCUMENTED IN MEDICAL RECORD: ICD-10-PCS | Mod: CPTII,S$GLB,, | Performed by: OTOLARYNGOLOGY

## 2022-07-28 PROCEDURE — 80053 COMPREHEN METABOLIC PANEL: CPT | Performed by: STUDENT IN AN ORGANIZED HEALTH CARE EDUCATION/TRAINING PROGRAM

## 2022-07-28 PROCEDURE — 36415 COLL VENOUS BLD VENIPUNCTURE: CPT | Mod: PO | Performed by: STUDENT IN AN ORGANIZED HEALTH CARE EDUCATION/TRAINING PROGRAM

## 2022-07-28 PROCEDURE — 99999 PR PBB SHADOW E&M-EST. PATIENT-LVL IV: ICD-10-PCS | Mod: PBBFAC,,, | Performed by: OTOLARYNGOLOGY

## 2022-07-28 PROCEDURE — 82607 VITAMIN B-12: CPT | Performed by: STUDENT IN AN ORGANIZED HEALTH CARE EDUCATION/TRAINING PROGRAM

## 2022-07-28 PROCEDURE — 99203 OFFICE O/P NEW LOW 30 MIN: CPT | Mod: 25,S$GLB,, | Performed by: OTOLARYNGOLOGY

## 2022-07-28 PROCEDURE — 3288F PR FALLS RISK ASSESSMENT DOCUMENTED: ICD-10-PCS | Mod: CPTII,S$GLB,, | Performed by: OTOLARYNGOLOGY

## 2022-07-28 PROCEDURE — 1160F PR REVIEW ALL MEDS BY PRESCRIBER/CLIN PHARMACIST DOCUMENTED: ICD-10-PCS | Mod: CPTII,S$GLB,, | Performed by: OTOLARYNGOLOGY

## 2022-07-28 PROCEDURE — 85025 COMPLETE CBC W/AUTO DIFF WBC: CPT | Performed by: STUDENT IN AN ORGANIZED HEALTH CARE EDUCATION/TRAINING PROGRAM

## 2022-07-28 PROCEDURE — 1126F PR PAIN SEVERITY QUANTIFIED, NO PAIN PRESENT: ICD-10-PCS | Mod: CPTII,S$GLB,, | Performed by: OTOLARYNGOLOGY

## 2022-07-28 PROCEDURE — 82728 ASSAY OF FERRITIN: CPT | Performed by: STUDENT IN AN ORGANIZED HEALTH CARE EDUCATION/TRAINING PROGRAM

## 2022-07-28 PROCEDURE — 1160F RVW MEDS BY RX/DR IN RCRD: CPT | Mod: CPTII,S$GLB,, | Performed by: OTOLARYNGOLOGY

## 2022-07-28 PROCEDURE — 83036 HEMOGLOBIN GLYCOSYLATED A1C: CPT | Performed by: STUDENT IN AN ORGANIZED HEALTH CARE EDUCATION/TRAINING PROGRAM

## 2022-07-28 PROCEDURE — 69210 EAR CERUMEN REMOVAL: ICD-10-PCS | Mod: S$GLB,,, | Performed by: OTOLARYNGOLOGY

## 2022-07-28 PROCEDURE — 1159F MED LIST DOCD IN RCRD: CPT | Mod: CPTII,S$GLB,, | Performed by: OTOLARYNGOLOGY

## 2022-07-28 PROCEDURE — 99999 PR PBB SHADOW E&M-EST. PATIENT-LVL IV: CPT | Mod: PBBFAC,,, | Performed by: OTOLARYNGOLOGY

## 2022-07-28 PROCEDURE — 1126F AMNT PAIN NOTED NONE PRSNT: CPT | Mod: CPTII,S$GLB,, | Performed by: OTOLARYNGOLOGY

## 2022-07-28 PROCEDURE — 99203 PR OFFICE/OUTPT VISIT, NEW, LEVL III, 30-44 MIN: ICD-10-PCS | Mod: 25,S$GLB,, | Performed by: OTOLARYNGOLOGY

## 2022-07-28 PROCEDURE — 84443 ASSAY THYROID STIM HORMONE: CPT | Performed by: STUDENT IN AN ORGANIZED HEALTH CARE EDUCATION/TRAINING PROGRAM

## 2022-07-28 PROCEDURE — 69210 REMOVE IMPACTED EAR WAX UNI: CPT | Mod: S$GLB,,, | Performed by: OTOLARYNGOLOGY

## 2022-07-28 PROCEDURE — 3288F FALL RISK ASSESSMENT DOCD: CPT | Mod: CPTII,S$GLB,, | Performed by: OTOLARYNGOLOGY

## 2022-07-28 PROCEDURE — 1101F PR PT FALLS ASSESS DOC 0-1 FALLS W/OUT INJ PAST YR: ICD-10-PCS | Mod: CPTII,S$GLB,, | Performed by: OTOLARYNGOLOGY

## 2022-07-28 PROCEDURE — 1101F PT FALLS ASSESS-DOCD LE1/YR: CPT | Mod: CPTII,S$GLB,, | Performed by: OTOLARYNGOLOGY

## 2022-07-28 NOTE — PROCEDURES
"Ear Cerumen Removal    Date/Time: 7/28/2022 9:20 AM  Performed by: Franky Espinal MD  Authorized by: Franky Espinal MD     Time out: Immediately prior to procedure a "time out" was called to verify the correct patient, procedure, equipment, support staff and site/side marked as required.    Consent Done?:  Yes (Verbal)    Local anesthetic:  None  Location details:  Both ears  Procedure type: curette    Procedure type comment:  Using binocular microscopy and combination of micro instruments and suction cerumen and external auditory canal contents were cleared without trauma or complication  Cerumen  Removal Results:  Cerumen completely removed  Patient tolerance:  Patient tolerated the procedure well with no immediate complications     Completely impacted with hard wax. Curette and alligators with micro.  A lot of discomfort but no trauma.      "

## 2022-07-28 NOTE — PATIENT INSTRUCTIONS
Comprehensive audiologic testing recommended.  Amplification of some form may prove necessary this should be discussed with audiology.    ROUTINE EAR CARE    Keep the ears dry in general.  Water and soap dry the ears increase scaling by stripping away the natural oils of the ears.    If water gets stuck in the ear a twisted tissue can be used on occasion or the water can be displaced with concentrated alcohol like swimming ear or 72-95% isopropyl alcohol.  This will of course further dry the ears.    The ear should be kept moist in general with mineral oil 3 or 4 drops 2 to 3 times a week or even every night.    If the ears need to be irrigated use either a 50 50 mixture of white vinegar and distilled water or 50 50 mixture of alcohol and white vinegar.    Painful draining years it do not resolve with conservative care could represent secondary infection and may need debridement/clearing of the wax, and topical antibiotic drops with or without steroids.

## 2022-07-28 NOTE — PROGRESS NOTES
[facility]    Subjective:      Patient: Marietta Gates Patient PCP: Ollie Bartlett MD         :  1932     Sex:  female      MRN:  [id number]          Date of Visit: 2022      Chief Complaint: Cerumen Impaction      Patient ID: Marietta Gates is a 89 y.o. female current smoker with dimentia but no no anticoagulation or chronic ear disease referred to me by Dr. Ollie Bartlett in consultation for wax impaction.  Daughter who accompanies her reports 6 months of worsening hearing loss needing the television up very loud which is atypical for her.  She has use Q-tips extensively in the past which had been removed from her access.  She denies any ear pain or drainage.    Review of Systems   Constitutional: Negative.    HENT: Positive for hearing loss. Negative for ear discharge and ear pain.    Eyes: Negative.    Respiratory: Negative.    Cardiovascular: Negative.    Gastrointestinal: Negative.    Endocrine: Negative.    Genitourinary: Negative.    Musculoskeletal: Negative.    Allergic/Immunologic: Negative.    Neurological: Negative.    Hematological: Negative.    Psychiatric/Behavioral: Negative.         Past Medical History  She has a past medical history of Cataract, COPD (chronic obstructive pulmonary disease), GERD (gastroesophageal reflux disease), Hyperlipidemia, Hypertension, Osteoporosis, and Thyroid disease.    Family / Surgical / Social History  Her family history is not on file.    Past Surgical History:   Procedure Laterality Date    BUNIONECTOMY      THYROIDECTOMY         Social History     Tobacco Use    Smoking status: Current Every Day Smoker     Packs/day: 0.50     Years: 32.00     Pack years: 16.00     Types: Cigarettes    Smokeless tobacco: Never Used   Substance and Sexual Activity    Alcohol use: Yes     Alcohol/week: 5.0 standard drinks     Types: 5 Glasses of wine per week    Drug use: Never    Sexual activity: Not Currently     Partners: Male        Medications  She has a current medication list which includes the following prescription(s): albuterol, albuterol-ipratropium, amlodipine, ascorbic acid (vitamin c), bupropion, calcium carbonate, losartan, lovastatin, multivitamin, omeprazole, ondansetron, UNABLE TO FIND, and vit a/vit c/vit e/zinc/copper.      Allergies  Review of patient's allergies indicates:  No Known Allergies    All medications, allergies, and past history have been reviewed.    Objective:      Vitals:  Vitals - 1 value per visit 7/20/2022 7/28/2022 7/28/2022   SYSTOLIC 128 - -   DIASTOLIC 76 - -   Pulse 77 - -   Temp 98.1 - 98.3   Resp 14 - -   SPO2 93 - -   Weight (lb) 127.87 - 128.31   Weight (kg) 58 - 58.2   Height 63 - 63   BMI (Calculated) 22.7 - 22.7   VISIT REPORT - - -   Pain Score  - 0 -       Body surface area is 1.61 meters squared.    Physical Exam:    GENERAL  APPEARANCE -  alert, appears stated age and cooperative  BARRIER(S) TO COMMUNICATION -  none VOICE - appropriate for age and gender    INTEGUMENTARY  no suspicious head and neck lesions    HEENT  HEAD: Normocephalic, without obvious abnormality, atraumatic  FACE: INSPECTION - Symmetric, no signs of trauma, no suspicious lesion(s)      EYES  Normal occular alignment and mobility with no visible nystagmus at rest    EARS/NOSE/MOUTH/THROAT  EARS  PINNAE AND EXTERNAL EARS - no suspicious lesion OTOSCOPIC EXAM (surgical microscopy was used for visualization/instrumentation): EAR EXAM - impacted wax cleared to normal canals and TM/ME's  HEARING - adequate with firm speech    NOSE AND SINUSES  EXTERNAL NOSE - Grossly normal for age/sex      CHEST AND LUNG   INSPECTION & AUSCULTATION - normal effort, no stridor    CARDIOVASCULAR  AUSCULTATION & PERIPHERAL VASCULAR - regular rate and rhythm.    NEUROLOGIC  MENTAL STATUS - alert, interactive CRANIAL NERVES - normal        Procedure(s):  Cerumen removal performed.  See procedure note.    Labs:  WBC   Date Value Ref Range Status    09/15/2021 5.8 3.8 - 10.8 Thousand/uL Final     Hemoglobin   Date Value Ref Range Status   09/15/2021 14.2 11.7 - 15.5 g/dL Final     Platelets   Date Value Ref Range Status   09/15/2021 258 140 - 400 Thousand/uL Final     Creatinine   Date Value Ref Range Status   09/15/2021 1.02 (H) 0.60 - 0.88 mg/dL Final     Comment:     For patients >49 years of age, the reference limit  for Creatinine is approximately 13% higher for people  identified as -American.          Glucose   Date Value Ref Range Status   09/15/2021 123 (H) 65 - 99 mg/dL Final     Comment:                   Fasting reference interval     For someone without known diabetes, a glucose value  between 100 and 125 mg/dL is consistent with  prediabetes and should be confirmed with a  follow-up test.          Hemoglobin A1C   Date Value Ref Range Status   11/14/2019 5.5 4.5 - 6.2 % Final     Comment:     According to ADA guidelines, hemoglobin A1C <7.0% represents  optimal control in non-pregnant diabetic patients.  Different  metrics may apply to specific populations.   Standards of Medical Care in Diabetes - 2016.  For the purpose of screening for the presence of diabetes:  <5.7%     Consistent with the absence of diabetes  5.7-6.4%  Consistent with increasing risk for diabetes   (prediabetes)  >or=6.5%  Consistent with diabetes  Currently no consensus exists for use of hemoglobin A1C  for diagnosis of diabetes for children.           Assessment:      Problem List Items Addressed This Visit        ENT    Bilateral hearing loss - Primary    Bilateral impacted cerumen               Plan:        Wax cleared w/o trauma.  Audiogram and routine ear care.

## 2022-08-01 LAB — VIT B12 SERPL-MCNC: 234 NG/L (ref 180–914)

## 2022-08-01 NOTE — PROGRESS NOTES
Please let the patient know that her cholesterol level was slightly high recommend for to work on diet and her kidney function has gotten slightly worse.  Would recommend for her to try to get off the Prilosec anti acid medication since this is known to worsened kidney function and for her to cut it in half for a week and then try to stop it if able thanks

## 2022-08-03 LAB — METHYLMALONATE SERPL-SCNC: 0.27 NMOL/ML

## 2023-01-19 ENCOUNTER — HOSPITAL ENCOUNTER (OUTPATIENT)
Dept: RADIOLOGY | Facility: HOSPITAL | Age: 88
Discharge: HOME | End: 2023-01-19
Attending: FAMILY MEDICINE
Payer: MEDICARE

## 2023-01-19 ENCOUNTER — OFFICE VISIT (OUTPATIENT)
Dept: FAMILY MEDICINE | Facility: CLINIC | Age: 88
End: 2023-01-19
Payer: MEDICARE

## 2023-01-19 VITALS
DIASTOLIC BLOOD PRESSURE: 72 MMHG | WEIGHT: 131.38 LBS | OXYGEN SATURATION: 95 % | BODY MASS INDEX: 23.28 KG/M2 | HEART RATE: 86 BPM | SYSTOLIC BLOOD PRESSURE: 124 MMHG | HEIGHT: 63 IN

## 2023-01-19 DIAGNOSIS — M25.551 ACUTE RIGHT HIP PAIN: ICD-10-CM

## 2023-01-19 DIAGNOSIS — M25.551 ACUTE RIGHT HIP PAIN: Primary | ICD-10-CM

## 2023-01-19 PROCEDURE — 1101F PR PT FALLS ASSESS DOC 0-1 FALLS W/OUT INJ PAST YR: ICD-10-PCS | Mod: CPTII,S$GLB,, | Performed by: FAMILY MEDICINE

## 2023-01-19 PROCEDURE — 1101F PT FALLS ASSESS-DOCD LE1/YR: CPT | Mod: CPTII,S$GLB,, | Performed by: FAMILY MEDICINE

## 2023-01-19 PROCEDURE — 3288F FALL RISK ASSESSMENT DOCD: CPT | Mod: CPTII,S$GLB,, | Performed by: FAMILY MEDICINE

## 2023-01-19 PROCEDURE — 1160F PR REVIEW ALL MEDS BY PRESCRIBER/CLIN PHARMACIST DOCUMENTED: ICD-10-PCS | Mod: CPTII,S$GLB,, | Performed by: FAMILY MEDICINE

## 2023-01-19 PROCEDURE — 99213 OFFICE O/P EST LOW 20 MIN: CPT | Mod: S$GLB,,, | Performed by: FAMILY MEDICINE

## 2023-01-19 PROCEDURE — 99999 PR PBB SHADOW E&M-EST. PATIENT-LVL IV: CPT | Mod: PBBFAC,,, | Performed by: FAMILY MEDICINE

## 2023-01-19 PROCEDURE — 73502 X-RAY EXAM HIP UNI 2-3 VIEWS: CPT | Mod: 26,RT,, | Performed by: RADIOLOGY

## 2023-01-19 PROCEDURE — 1159F MED LIST DOCD IN RCRD: CPT | Mod: CPTII,S$GLB,, | Performed by: FAMILY MEDICINE

## 2023-01-19 PROCEDURE — 73502 X-RAY EXAM HIP UNI 2-3 VIEWS: CPT | Mod: TC,FY,PO,RT

## 2023-01-19 PROCEDURE — 1125F AMNT PAIN NOTED PAIN PRSNT: CPT | Mod: CPTII,S$GLB,, | Performed by: FAMILY MEDICINE

## 2023-01-19 PROCEDURE — 73502 XR HIP WITH PELVIS WHEN PERFORMED, 2 OR 3  VIEWS RIGHT: ICD-10-PCS | Mod: 26,RT,, | Performed by: RADIOLOGY

## 2023-01-19 PROCEDURE — 1160F RVW MEDS BY RX/DR IN RCRD: CPT | Mod: CPTII,S$GLB,, | Performed by: FAMILY MEDICINE

## 2023-01-19 PROCEDURE — 1159F PR MEDICATION LIST DOCUMENTED IN MEDICAL RECORD: ICD-10-PCS | Mod: CPTII,S$GLB,, | Performed by: FAMILY MEDICINE

## 2023-01-19 PROCEDURE — 1125F PR PAIN SEVERITY QUANTIFIED, PAIN PRESENT: ICD-10-PCS | Mod: CPTII,S$GLB,, | Performed by: FAMILY MEDICINE

## 2023-01-19 PROCEDURE — 99999 PR PBB SHADOW E&M-EST. PATIENT-LVL IV: ICD-10-PCS | Mod: PBBFAC,,, | Performed by: FAMILY MEDICINE

## 2023-01-19 PROCEDURE — 99213 PR OFFICE/OUTPT VISIT, EST, LEVL III, 20-29 MIN: ICD-10-PCS | Mod: S$GLB,,, | Performed by: FAMILY MEDICINE

## 2023-01-19 PROCEDURE — 3288F PR FALLS RISK ASSESSMENT DOCUMENTED: ICD-10-PCS | Mod: CPTII,S$GLB,, | Performed by: FAMILY MEDICINE

## 2023-01-19 RX ORDER — NAPROXEN 500 MG/1
500 TABLET ORAL 2 TIMES DAILY WITH MEALS
Qty: 60 TABLET | Refills: 0 | Status: SHIPPED | OUTPATIENT
Start: 2023-01-19 | End: 2023-02-18

## 2023-01-19 NOTE — PROGRESS NOTES
"Subjective:       Patient ID: Marietta Gates is a 89 y.o. female.    Chief Complaint: Hip Pain (Right side )     Here today for an acute visit.  She is a patient of , new to me today.  She is here with her son.  She has active medical issues of dementia, macular degeneration, hearing loss, Chronic Bronchitis, HTN, HLD, CKD, Prediabetes, Osteoporosis and tobacco abuse.  She is here today c/o right sided hip pain x 2-3 days.  Normally walks with a walker.  Over the past few days, she has been wincing when she puts weight on her right leg.  Pain 5/10.  Per son, no recent falls or trauma.      Review of Systems   Constitutional:  Negative for activity change, appetite change, fatigue and fever.   Respiratory:  Negative for cough, shortness of breath and wheezing.    Cardiovascular:  Negative for chest pain and palpitations.   Gastrointestinal:  Negative for abdominal pain, constipation, diarrhea and nausea.   Skin:  Negative for pallor and rash.   Neurological:  Negative for dizziness, syncope, light-headedness and headaches.     Objective:      Vitals:    01/19/23 1058   BP: 124/72   Pulse: 86   SpO2: 95%   Weight: 59.6 kg (131 lb 6.3 oz)   Height: 5' 3" (1.6 m)      Physical Exam  Constitutional:       General: She is not in acute distress.     Comments: In wheelchair   Cardiovascular:      Rate and Rhythm: Normal rate and regular rhythm.      Heart sounds: Normal heart sounds. No murmur heard.  Pulmonary:      Effort: Pulmonary effort is normal. No respiratory distress.      Breath sounds: Normal breath sounds. No wheezing, rhonchi or rales.   Musculoskeletal:         General: No swelling or deformity.      Lumbar back: No tenderness or bony tenderness.      Right hip: No deformity or tenderness. Normal range of motion.   Skin:     General: Skin is warm and dry.   Neurological:      General: No focal deficit present.      Mental Status: She is alert.   Psychiatric:         Mood and Affect: Mood " normal.         Behavior: Behavior normal.         Thought Content: Thought content normal.       Results for orders placed or performed in visit on 07/28/22   CBC Auto Differential   Result Value Ref Range    WBC 7.60 3.90 - 12.70 K/uL    RBC 4.59 4.00 - 5.40 M/uL    Hemoglobin 14.5 12.0 - 16.0 g/dL    Hematocrit 44.6 37.0 - 48.5 %    MCV 97 82 - 98 fL    MCH 31.6 (H) 27.0 - 31.0 pg    MCHC 32.5 32.0 - 36.0 g/dL    RDW 13.6 11.5 - 14.5 %    Platelets 286 150 - 450 K/uL    MPV 11.3 9.2 - 12.9 fL    Immature Granulocytes 0.5 0.0 - 0.5 %    Gran # (ANC) 6.0 1.8 - 7.7 K/uL    Immature Grans (Abs) 0.04 0.00 - 0.04 K/uL    Lymph # 1.0 1.0 - 4.8 K/uL    Mono # 0.5 0.3 - 1.0 K/uL    Eos # 0.0 0.0 - 0.5 K/uL    Baso # 0.07 0.00 - 0.20 K/uL    nRBC 0 0 /100 WBC    Gran % 79.6 (H) 38.0 - 73.0 %    Lymph % 12.6 (L) 18.0 - 48.0 %    Mono % 5.9 4.0 - 15.0 %    Eosinophil % 0.5 0.0 - 8.0 %    Basophil % 0.9 0.0 - 1.9 %    Differential Method Automated    Comprehensive Metabolic Panel   Result Value Ref Range    Sodium 137 136 - 145 mmol/L    Potassium 4.4 3.5 - 5.1 mmol/L    Chloride 100 95 - 110 mmol/L    CO2 25 23 - 29 mmol/L    Glucose 100 70 - 110 mg/dL    BUN 21 8 - 23 mg/dL    Creatinine 1.2 0.5 - 1.4 mg/dL    Calcium 9.4 8.7 - 10.5 mg/dL    Total Protein 7.9 6.0 - 8.4 g/dL    Albumin 3.7 3.5 - 5.2 g/dL    Total Bilirubin 0.6 0.1 - 1.0 mg/dL    Alkaline Phosphatase 65 55 - 135 U/L    AST 15 10 - 40 U/L    ALT 8 (L) 10 - 44 U/L    Anion Gap 12 8 - 16 mmol/L    eGFR if African American 46.0 (A) >60 mL/min/1.73 m^2    eGFR if non  39.9 (A) >60 mL/min/1.73 m^2   Hemoglobin A1C   Result Value Ref Range    Hemoglobin A1C 5.0 4.0 - 5.6 %    Estimated Avg Glucose 97 68 - 131 mg/dL   TSH   Result Value Ref Range    TSH 2.615 0.400 - 4.000 uIU/mL   Lipid Panel   Result Value Ref Range    Cholesterol 227 (H) 120 - 199 mg/dL    Triglycerides 125 30 - 150 mg/dL    HDL 66 40 - 75 mg/dL    LDL Cholesterol 136.0 63.0 -  159.0 mg/dL    HDL/Cholesterol Ratio 29.1 20.0 - 50.0 %    Total Cholesterol/HDL Ratio 3.4 2.0 - 5.0    Non-HDL Cholesterol 161 mg/dL   Vitamin B12 Deficiency Panel   Result Value Ref Range    Vitamin B-12 234 180 - 914 ng/L   Folate   Result Value Ref Range    Folate 8.8 4.0 - 24.0 ng/mL   Ferritin   Result Value Ref Range    Ferritin 244 20.0 - 300.0 ng/mL   Methylmalonic Acid, Serum   Result Value Ref Range    B12 Def. Methylmalonic Acid 0.27 <=0.40 nmol/mL      Assessment:       1. Acute right hip pain        Plan:       Acute right hip pain  -     X-Ray Hip 2 or 3 views Right (with Pelvis when performed); Future; Expected date: 01/19/2023  -     naproxen (NAPROSYN) 500 MG tablet; Take 1 tablet (500 mg total) by mouth 2 (two) times daily with meals.  Dispense: 60 tablet; Refill: 0    Due to her dementia, she is an unreliable historian.  Check X-ray at this time.  Trial of NSAIDs and heat.      Medication List with Changes/Refills   New Medications    NAPROXEN (NAPROSYN) 500 MG TABLET    Take 1 tablet (500 mg total) by mouth 2 (two) times daily with meals.   Current Medications    ALBUTEROL (ACCUNEB) 1.25 MG/3 ML NEBU    Take 1.25 mg by nebulization every 6 (six) hours as needed.    ALBUTEROL-IPRATROPIUM (DUO-NEB) 2.5 MG-0.5 MG/3 ML NEBULIZER SOLUTION    Take 3 mLs by nebulization every 6 (six) hours while awake. Rescue    AMLODIPINE (NORVASC) 2.5 MG TABLET    TAKE 1 TABLET(2.5 MG) BY MOUTH EVERY DAY    ASCORBIC ACID, VITAMIN C, (VITAMIN C) 500 MG TABLET    Take 500 mg by mouth once daily.    BUPROPION (WELLBUTRIN XL) 300 MG 24 HR TABLET    TAKE 1 TABLET(300 MG) BY MOUTH EVERY DAY    CALCIUM CARBONATE (OS-KATELYN) 500 MG CALCIUM (1,250 MG) CHEWABLE TABLET    Take 2 tablets by mouth once daily. 300 mg gummy chewable    LOSARTAN (COZAAR) 100 MG TABLET    Take 1 tablet (100 mg total) by mouth once daily.    LOVASTATIN (MEVACOR) 20 MG TABLET    TAKE 1 TABLET(20 MG) BY MOUTH EVERY EVENING    MULTIVITAMIN CAPSULE    Take 1  capsule by mouth once daily.    OMEPRAZOLE (PRILOSEC) 20 MG CAPSULE    TAKE 1 CAPSULE(20 MG) BY MOUTH EVERY DAY    ONDANSETRON (ZOFRAN-ODT) 4 MG TBDL    Take 1 tablet (4 mg total) by mouth every 12 (twelve) hours as needed (N/V).    UNABLE TO FIND    Take 1,053 mg by mouth once daily. Brain support    VIT A/VIT C/VIT E/ZINC/COPPER (OCUVITE PRESERVISION ORAL)    Take 2 tablets by mouth once daily.

## 2023-01-20 ENCOUNTER — TELEPHONE (OUTPATIENT)
Dept: FAMILY MEDICINE | Facility: CLINIC | Age: 88
End: 2023-01-20
Payer: MEDICARE

## 2023-01-20 NOTE — TELEPHONE ENCOUNTER
----- Message from Halie Wright sent at 1/20/2023 10:56 AM CST -----  Contact: pt son  Type: Needs Medical Advice  Who Called:  pt son    Best Call Back Number: [phone]    Additional Information: pt son would like to know pt xray results. Please advise.

## 2023-03-03 ENCOUNTER — PES CALL (OUTPATIENT)
Dept: ADMINISTRATIVE | Facility: CLINIC | Age: 88
End: 2023-03-03
Payer: MEDICARE

## 2023-04-12 ENCOUNTER — OFFICE VISIT (OUTPATIENT)
Dept: HOME HEALTH SERVICES | Facility: CLINIC | Age: 88
End: 2023-04-12
Payer: MEDICARE

## 2023-04-12 VITALS
BODY MASS INDEX: 23.03 KG/M2 | DIASTOLIC BLOOD PRESSURE: 93 MMHG | HEART RATE: 68 BPM | WEIGHT: 130 LBS | SYSTOLIC BLOOD PRESSURE: 164 MMHG | OXYGEN SATURATION: 97 %

## 2023-04-12 DIAGNOSIS — R11.2 NAUSEA AND VOMITING: ICD-10-CM

## 2023-04-12 DIAGNOSIS — E78.2 MIXED HYPERLIPIDEMIA: ICD-10-CM

## 2023-04-12 DIAGNOSIS — I10 ESSENTIAL HYPERTENSION: ICD-10-CM

## 2023-04-12 DIAGNOSIS — F32.A ANXIETY AND DEPRESSION: ICD-10-CM

## 2023-04-12 DIAGNOSIS — F01.50 VASCULAR DEMENTIA WITHOUT BEHAVIORAL DISTURBANCE: ICD-10-CM

## 2023-04-12 DIAGNOSIS — N18.32 STAGE 3B CHRONIC KIDNEY DISEASE: ICD-10-CM

## 2023-04-12 DIAGNOSIS — H35.3231 EXUDATIVE AGE-RELATED MACULAR DEGENERATION, BILATERAL, WITH ACTIVE CHOROIDAL NEOVASCULARIZATION: ICD-10-CM

## 2023-04-12 DIAGNOSIS — F41.9 ANXIETY AND DEPRESSION: ICD-10-CM

## 2023-04-12 DIAGNOSIS — J41.8 MIXED SIMPLE AND MUCOPURULENT CHRONIC BRONCHITIS: ICD-10-CM

## 2023-04-12 DIAGNOSIS — Z00.00 ENCOUNTER FOR PREVENTIVE HEALTH EXAMINATION: Primary | ICD-10-CM

## 2023-04-12 DIAGNOSIS — Z72.0 TOBACCO ABUSE: ICD-10-CM

## 2023-04-12 DIAGNOSIS — R26.9 GAIT ABNORMALITY: ICD-10-CM

## 2023-04-12 DIAGNOSIS — R11.0 NAUSEA: ICD-10-CM

## 2023-04-12 DIAGNOSIS — I70.0 ABDOMINAL AORTIC ATHEROSCLEROSIS: ICD-10-CM

## 2023-04-12 DIAGNOSIS — K21.9 GASTROESOPHAGEAL REFLUX DISEASE WITHOUT ESOPHAGITIS: ICD-10-CM

## 2023-04-12 PROCEDURE — 1100F PR PT FALLS ASSESS DOC 2+ FALLS/FALL W/INJURY/YR: ICD-10-PCS | Mod: CPTII,S$GLB,, | Performed by: NURSE PRACTITIONER

## 2023-04-12 PROCEDURE — 1160F RVW MEDS BY RX/DR IN RCRD: CPT | Mod: CPTII,S$GLB,, | Performed by: NURSE PRACTITIONER

## 2023-04-12 PROCEDURE — 3288F PR FALLS RISK ASSESSMENT DOCUMENTED: ICD-10-PCS | Mod: CPTII,S$GLB,, | Performed by: NURSE PRACTITIONER

## 2023-04-12 PROCEDURE — 1159F MED LIST DOCD IN RCRD: CPT | Mod: CPTII,S$GLB,, | Performed by: NURSE PRACTITIONER

## 2023-04-12 PROCEDURE — G0439 PPPS, SUBSEQ VISIT: HCPCS | Mod: S$GLB,,, | Performed by: NURSE PRACTITIONER

## 2023-04-12 PROCEDURE — 1126F AMNT PAIN NOTED NONE PRSNT: CPT | Mod: CPTII,S$GLB,, | Performed by: NURSE PRACTITIONER

## 2023-04-12 PROCEDURE — 1160F PR REVIEW ALL MEDS BY PRESCRIBER/CLIN PHARMACIST DOCUMENTED: ICD-10-PCS | Mod: CPTII,S$GLB,, | Performed by: NURSE PRACTITIONER

## 2023-04-12 PROCEDURE — G0439 PR MEDICARE ANNUAL WELLNESS SUBSEQUENT VISIT: ICD-10-PCS | Mod: S$GLB,,, | Performed by: NURSE PRACTITIONER

## 2023-04-12 PROCEDURE — 1159F PR MEDICATION LIST DOCUMENTED IN MEDICAL RECORD: ICD-10-PCS | Mod: CPTII,S$GLB,, | Performed by: NURSE PRACTITIONER

## 2023-04-12 PROCEDURE — 1126F PR PAIN SEVERITY QUANTIFIED, NO PAIN PRESENT: ICD-10-PCS | Mod: CPTII,S$GLB,, | Performed by: NURSE PRACTITIONER

## 2023-04-12 PROCEDURE — 1100F PTFALLS ASSESS-DOCD GE2>/YR: CPT | Mod: CPTII,S$GLB,, | Performed by: NURSE PRACTITIONER

## 2023-04-12 PROCEDURE — 3288F FALL RISK ASSESSMENT DOCD: CPT | Mod: CPTII,S$GLB,, | Performed by: NURSE PRACTITIONER

## 2023-04-12 RX ORDER — ONDANSETRON 4 MG/1
4 TABLET, ORALLY DISINTEGRATING ORAL EVERY 12 HOURS PRN
Qty: 20 TABLET | Refills: 0 | Status: SHIPPED | OUTPATIENT
Start: 2023-04-12 | End: 2024-02-15

## 2023-04-12 RX ORDER — LOSARTAN POTASSIUM 100 MG/1
100 TABLET ORAL DAILY
Qty: 90 TABLET | Refills: 3 | Status: SHIPPED | OUTPATIENT
Start: 2023-04-12

## 2023-04-12 RX ORDER — AMLODIPINE BESYLATE 2.5 MG/1
2.5 TABLET ORAL DAILY
Qty: 90 TABLET | Refills: 3 | Status: SHIPPED | OUTPATIENT
Start: 2023-04-12 | End: 2024-01-24

## 2023-04-12 RX ORDER — OMEPRAZOLE 20 MG/1
20 CAPSULE, DELAYED RELEASE ORAL DAILY
Qty: 90 CAPSULE | Refills: 3 | Status: SHIPPED | OUTPATIENT
Start: 2023-04-12 | End: 2024-02-15

## 2023-04-12 RX ORDER — DONEPEZIL HYDROCHLORIDE 10 MG/1
10 TABLET, FILM COATED ORAL NIGHTLY
COMMUNITY
End: 2023-09-08

## 2023-04-12 RX ORDER — BUPROPION HYDROCHLORIDE 300 MG/1
300 TABLET ORAL DAILY
Qty: 90 TABLET | Refills: 3 | Status: SHIPPED | OUTPATIENT
Start: 2023-04-12

## 2023-04-12 NOTE — TELEPHONE ENCOUNTER
----- Message from Poonam Rodriguez NP sent at 4/12/2023  9:17 AM CDT -----  Regarding: Refill  Patient needs refill on Omeprazole, losartan, amlodipine, wellbutrin and zofran.  Please give daughter Christine a call with any questions.  Thanks!

## 2023-04-12 NOTE — TELEPHONE ENCOUNTER
No new care gaps identified.  [facility] Embedded Care Gaps. Reference number: [id number]. 4/12/2023   9:26:54 AM SHAHEENT

## 2023-04-12 NOTE — PROGRESS NOTES
Marietta Gates presented for a  Medicare AWV and comprehensive Health Risk Assessment today. The following components were reviewed and updated:    Medical history  Family History  Social history  Allergies and Current Medications  Health Risk Assessment  Health Maintenance  Care Team         ** See Completed Assessments for Annual Wellness Visit within the encounter summary.**         The following assessments were completed:  Living Situation  CAGE  Depression Screening  Timed Get Up and Go  Whisper Test  Cognitive Function Screening - not performed, dementia  Nutrition Screening  ADL Screening  PAQ Screening    Review for Opioid Screening: Patient does not have rx for Opioids.  Review for Substance Use Disorders: Patient does not use substance.      Vitals:    04/12/23 0853   BP: (!) 164/93   Pulse: 68   SpO2: 97%   Weight: 59 kg (130 lb)     Body mass index is 23.03 kg/m².  Physical Exam  Vitals reviewed.   HENT:      Head: Normocephalic.      Right Ear: Decreased hearing noted.      Left Ear: Decreased hearing noted.   Eyes:      Pupils: Pupils are equal, round, and reactive to light.   Cardiovascular:      Rate and Rhythm: Normal rate and regular rhythm.      Heart sounds: Normal heart sounds.   Pulmonary:      Effort: Pulmonary effort is normal.      Breath sounds: Normal breath sounds.   Abdominal:      General: Bowel sounds are normal.      Palpations: Abdomen is soft.   Musculoskeletal:         General: Normal range of motion.      Cervical back: Normal range of motion.      Right lower leg: No edema.      Left lower leg: No edema.   Skin:     General: Skin is warm and dry.   Neurological:      Mental Status: She is alert and oriented to person, place, and time.      Motor: Weakness present.      Gait: Gait abnormal (cane).   Psychiatric:         Behavior: Behavior normal.         Cognition and Memory: Cognition is impaired. Memory is impaired.             Diagnoses and health risks identified today  and associated recommendations/orders:    1. Encounter for preventive health examination  - Above assessments completed. Preventive measures and health maintenance reviewed with patient and daughter Christine.  - Ambulatory referral/consult to Outpatient Case Management    2. Vascular dementia without behavioral disturbance  Stable, followed by PCP  -on donepezil    3. Mixed simple and mucopurulent chronic bronchitis  Stable, followed by PCP  -on inhalers and nebs    4. Tobacco abuse  -encouraged cessation, declines, counseled  -daughter reports they only give patient 6 cigs per day    5. Stage 3b chronic kidney disease  Stable, followed by PCP  -encouraged hydration and avoidance of NSAIDs  -advised per PCP note to d/c omeprazole which can worsen the progression of kidney disease    6. Abdominal aortic atherosclerosis  Stable, followed by PCP  -on statin and asa    7. Exudative age-related macular degeneration, bilateral, with active choroidal neovascularization  Stable, followed by Ophthalmology    8. Anxiety and depression  Stable, followed by PCP  -on bupropion    9. Essential hypertension  Stable, followed by PCP  -on losartan and amlodipine    10. Mixed hyperlipidemia  Stable, followed by PCP  -on statin    11. Nausea  Stable, followed by PCP  -on zofran prn    12. Gait abnormality  -falls reported, uses cane (forgets sometimes due to dementia)  -discussed safety and fall precautions      Provided Marietta with a 5-10 year written screening schedule and personal prevention plan. Recommendations were developed using the USPSTF age appropriate recommendations. Education, counseling, and referrals were provided as needed. After Visit Summary printed and given to patient which includes a list of additional screenings\tests needed.    Follow up in about 1 year (around 4/12/2024) for your next annual wellness visit.    Poonam Rodriguez NP  I offered to discuss advanced care planning, including how to pick a person who  would make decisions for you if you were unable to make them for yourself, called a health care power of , and what kind of decisions you might make such as use of life sustaining treatments such as ventilators and tube feeding when faced with a life limiting illness recorded on a living will that they will need to know. (How you want to be cared for as you near the end of your natural life)     X  Patient is unable to engage in a discussion regarding advanced directives due to severe physical and/or cognitive impairment.

## 2023-04-12 NOTE — PATIENT INSTRUCTIONS
Counseling and Referral of Other Preventative  (Italic type indicates deductible and co-insurance are waived)    Patient Name: Marietta Gates  Today's Date: 4/12/2023    Health Maintenance       Date Due Completion Date    TETANUS VACCINE Never done ---    Shingles Vaccine (2 of 3) 01/10/2012 11/15/2011    COVID-19 Vaccine (3 - Booster for Moderna series) 06/02/2021 4/7/2021    Influenza Vaccine (1) 09/01/2022 9/18/2017    Hemoglobin A1c (Prediabetes) 07/28/2023 7/28/2022    Lipid Panel 07/28/2027 7/28/2022        Orders Placed This Encounter   Procedures    Ambulatory referral/consult to Outpatient Case Management       The following information is provided to all patients.  This information is to help you find resources for any of the problems found today that may be affecting your health:                Living healthy guide: www.[facility].[place].gov      Understanding Diabetes: www.diabetes.org      Eating healthy: www.cdc.gov/healthyweight      [facility] home safety checklist: www.cdc.gov/steadi/patient.html      Agency on Aging: www.goea.[place].[facility]      Alcoholics anonymous (AA): www.aa.org      Physical Activity: www.[place].nih.gov/qh6hdve      Tobacco use: www.quitwithusla.org

## 2023-04-13 PROBLEM — H35.3231 EXUDATIVE AGE-RELATED MACULAR DEGENERATION, BILATERAL, WITH ACTIVE CHOROIDAL NEOVASCULARIZATION: Status: ACTIVE | Noted: 2023-04-13

## 2023-04-17 ENCOUNTER — HOSPITAL ENCOUNTER (EMERGENCY)
Facility: HOSPITAL | Age: 88
Discharge: HOME | End: 2023-04-18
Attending: EMERGENCY MEDICINE
Payer: MEDICARE

## 2023-04-17 DIAGNOSIS — R07.81 RIB PAIN: ICD-10-CM

## 2023-04-17 DIAGNOSIS — R07.89 CHEST WALL PAIN: ICD-10-CM

## 2023-04-17 LAB
ALBUMIN SERPL BCP-MCNC: 3.8 G/DL (ref 3.5–5.2)
ALP SERPL-CCNC: 58 U/L (ref 55–135)
ALT SERPL W/O P-5'-P-CCNC: 7 U/L (ref 10–44)
ANION GAP SERPL CALC-SCNC: 14 MMOL/L (ref 8–16)
AST SERPL-CCNC: 22 U/L (ref 10–40)
BACTERIA #/AREA URNS HPF: NEGATIVE /HPF
BASOPHILS # BLD AUTO: 0.06 K/UL (ref 0–0.2)
BASOPHILS NFR BLD: 0.7 % (ref 0–1.9)
BILIRUB SERPL-MCNC: 0.8 MG/DL (ref 0.1–1)
BILIRUB UR QL STRIP: NEGATIVE
BUN SERPL-MCNC: 23 MG/DL (ref 10–30)
CALCIUM SERPL-MCNC: 10.1 MG/DL (ref 8.7–10.5)
CHLORIDE SERPL-SCNC: 93 MMOL/L (ref 95–110)
CLARITY UR: CLEAR
CO2 SERPL-SCNC: 27 MMOL/L (ref 23–29)
COLOR UR: YELLOW
CREAT SERPL-MCNC: 0.9 MG/DL (ref 0.5–1.4)
DIFFERENTIAL METHOD: ABNORMAL
EOSINOPHIL # BLD AUTO: 0 K/UL (ref 0–0.5)
EOSINOPHIL NFR BLD: 0.2 % (ref 0–8)
ERYTHROCYTE [DISTWIDTH] IN BLOOD BY AUTOMATED COUNT: 13.8 % (ref 11.5–14.5)
EST. GFR  (NO RACE VARIABLE): >60 ML/MIN/1.73 M^2
GLUCOSE SERPL-MCNC: 141 MG/DL (ref 70–110)
GLUCOSE UR QL STRIP: NEGATIVE
HCT VFR BLD AUTO: 45.5 % (ref 37–48.5)
HGB BLD-MCNC: 15 G/DL (ref 12–16)
HGB UR QL STRIP: NEGATIVE
HYALINE CASTS #/AREA URNS LPF: 17 /LPF
IMM GRANULOCYTES # BLD AUTO: 0.04 K/UL (ref 0–0.04)
IMM GRANULOCYTES NFR BLD AUTO: 0.5 % (ref 0–0.5)
KETONES UR QL STRIP: ABNORMAL
LEUKOCYTE ESTERASE UR QL STRIP: ABNORMAL
LIPASE SERPL-CCNC: 80 U/L (ref 4–60)
LYMPHOCYTES # BLD AUTO: 0.9 K/UL (ref 1–4.8)
LYMPHOCYTES NFR BLD: 11.1 % (ref 18–48)
MCH RBC QN AUTO: 31.4 PG (ref 27–31)
MCHC RBC AUTO-ENTMCNC: 33 G/DL (ref 32–36)
MCV RBC AUTO: 95 FL (ref 82–98)
MICROSCOPIC COMMENT: ABNORMAL
MONOCYTES # BLD AUTO: 0.6 K/UL (ref 0.3–1)
MONOCYTES NFR BLD: 6.9 % (ref 4–15)
NEUTROPHILS # BLD AUTO: 6.7 K/UL (ref 1.8–7.7)
NEUTROPHILS NFR BLD: 80.6 % (ref 38–73)
NITRITE UR QL STRIP: NEGATIVE
NRBC BLD-RTO: 0 /100 WBC
PH UR STRIP: 6 [PH] (ref 5–8)
PLATELET # BLD AUTO: 253 K/UL (ref 150–450)
PMV BLD AUTO: 11.1 FL (ref 9.2–12.9)
POTASSIUM SERPL-SCNC: 3.9 MMOL/L (ref 3.5–5.1)
PROT SERPL-MCNC: 7.4 G/DL (ref 6–8.4)
PROT UR QL STRIP: ABNORMAL
RBC # BLD AUTO: 4.78 M/UL (ref 4–5.4)
RBC #/AREA URNS HPF: 2 /HPF (ref 0–4)
SODIUM SERPL-SCNC: 134 MMOL/L (ref 136–145)
SP GR UR STRIP: 1.03 (ref 1–1.03)
SQUAMOUS #/AREA URNS HPF: 3 /HPF
TROPONIN I SERPL HS-MCNC: 7.1 PG/ML (ref 0–14.9)
TROPONIN I SERPL HS-MCNC: 7.7 PG/ML (ref 0–14.9)
URN SPEC COLLECT METH UR: ABNORMAL
UROBILINOGEN UR STRIP-ACNC: ABNORMAL EU/DL
WBC # BLD AUTO: 8.37 K/UL (ref 3.9–12.7)
WBC #/AREA URNS HPF: 7 /HPF (ref 0–5)

## 2023-04-17 PROCEDURE — 93010 ELECTROCARDIOGRAM REPORT: CPT | Mod: ,,, | Performed by: SPECIALIST

## 2023-04-17 PROCEDURE — 83690 ASSAY OF LIPASE: CPT | Performed by: STUDENT IN AN ORGANIZED HEALTH CARE EDUCATION/TRAINING PROGRAM

## 2023-04-17 PROCEDURE — 80053 COMPREHEN METABOLIC PANEL: CPT | Performed by: STUDENT IN AN ORGANIZED HEALTH CARE EDUCATION/TRAINING PROGRAM

## 2023-04-17 PROCEDURE — 51701 INSERT BLADDER CATHETER: CPT | Mod: 59

## 2023-04-17 PROCEDURE — 93010 EKG 12-LEAD: ICD-10-PCS | Mod: ,,, | Performed by: SPECIALIST

## 2023-04-17 PROCEDURE — 99285 EMERGENCY DEPT VISIT HI MDM: CPT | Mod: 25

## 2023-04-17 PROCEDURE — 84484 ASSAY OF TROPONIN QUANT: CPT | Mod: 91 | Performed by: STUDENT IN AN ORGANIZED HEALTH CARE EDUCATION/TRAINING PROGRAM

## 2023-04-17 PROCEDURE — 85025 COMPLETE CBC W/AUTO DIFF WBC: CPT | Performed by: STUDENT IN AN ORGANIZED HEALTH CARE EDUCATION/TRAINING PROGRAM

## 2023-04-17 PROCEDURE — 81001 URINALYSIS AUTO W/SCOPE: CPT | Performed by: STUDENT IN AN ORGANIZED HEALTH CARE EDUCATION/TRAINING PROGRAM

## 2023-04-17 PROCEDURE — 93005 ELECTROCARDIOGRAM TRACING: CPT | Performed by: SPECIALIST

## 2023-04-17 RX ORDER — CEPHALEXIN 500 MG/1
500 CAPSULE ORAL 4 TIMES DAILY
Qty: 20 CAPSULE | Refills: 0 | Status: SHIPPED | OUTPATIENT
Start: 2023-04-17 | End: 2023-04-22

## 2023-04-17 NOTE — ED PROVIDER NOTES
Encounter Date: 4/17/2023       History     Chief Complaint   Patient presents with    Fall     Fall out of wheelchair yesterday morning, pt states she hit posterior head on asphalt, not on blood thinners, pt c/o right rib pain, family states pt is more yellow than normal noticed today     HPI  Patient is a 89-year-old female presenting to the emergency department with a past medical history COPD GERD, dementia, hyperlipidemia, hypertension presenting to the emergency department with complaints of rib pain.  Her pain isn't intermittent.  Family notes that patient had a fall yesterday while in a wheelchair, patient did hit head on asphalt but denies any loss of consciousness, fall was witnessed.  Patient is not on any blood thinners.  She is been having persistent right rib pain since the fall.  Patient is also concerned because the patient's fingernails and notices today in her fingernails in his hands.     Review of patient's allergies indicates:   Allergen Reactions    Indomethacin Rash     Past Medical History:   Diagnosis Date    Cataract     COPD (chronic obstructive pulmonary disease)     GERD (gastroesophageal reflux disease)     Hyperlipidemia     Hypertension     Osteoporosis     Thyroid disease      Past Surgical History:   Procedure Laterality Date    BUNIONECTOMY      THYROIDECTOMY       No family history on file.  Social History     Tobacco Use    Smoking status: Every Day     Packs/day: 0.25     Years: 76.00     Pack years: 19.00     Types: Cigarettes    Smokeless tobacco: Never    Tobacco comments:     Family gives patient 6 cigarettes per day   Substance Use Topics    Alcohol use: Yes     Alcohol/week: 5.0 standard drinks     Types: 5 Glasses of wine per week    Drug use: Never     Review of Systems   Constitutional:  Negative for fever.   HENT:  Negative for sore throat.    Respiratory:  Negative for shortness of breath.    Cardiovascular:  Negative for chest pain.   Gastrointestinal:  Negative for  nausea.   Genitourinary:  Negative for dysuria.   Musculoskeletal:  Negative for back pain.        Rib pain right-sided   Skin:  Negative for rash.   Neurological:  Negative for weakness.   Hematological:  Does not bruise/bleed easily.     Physical Exam     Initial Vitals [04/17/23 1830]   BP Pulse Resp Temp SpO2   (!) 169/90 64 18 97.6 °F (36.4 °C) (!) 93 %      MAP       --         Physical Exam    Constitutional: She appears well-developed and well-nourished.   HENT:   Head: Normocephalic and atraumatic.   Eyes: EOM are normal. Pupils are equal, round, and reactive to light.   Neck: No thyromegaly present. No tracheal deviation present.   Normal range of motion.  Cardiovascular:  Normal rate, regular rhythm and normal heart sounds.     Exam reveals no friction rub.       No murmur heard.  Pulmonary/Chest: Breath sounds normal. No respiratory distress. She has no wheezes.   No reproducible chest wall or rib pain, crepitus or overlying trauma appreciated on exam.   Abdominal: Abdomen is soft. She exhibits no distension. There is no abdominal tenderness.   Negative Bateman's sign, abdomen is soft on evaluation, no reproducible tenderness, rebound or guarding.   Musculoskeletal:         General: No tenderness. Normal range of motion.      Cervical back: Normal range of motion.     Neurological: She is alert and oriented to person, place, and time. She displays normal reflexes. No cranial nerve deficit.   Skin: Capillary refill takes less than 2 seconds.   Psychiatric: She has a normal mood and affect.       ED Course   Procedures  Labs Reviewed   CBC W/ AUTO DIFFERENTIAL - Abnormal; Notable for the following components:       Result Value    MCH 31.4 (*)     Lymph # 0.9 (*)     Gran % 80.6 (*)     Lymph % 11.1 (*)     All other components within normal limits   COMPREHENSIVE METABOLIC PANEL - Abnormal; Notable for the following components:    Sodium 134 (*)     Chloride 93 (*)     Glucose 141 (*)     ALT 7 (*)     All  other components within normal limits   LIPASE - Abnormal; Notable for the following components:    Lipase 80 (*)     All other components within normal limits   URINALYSIS, REFLEX TO URINE CULTURE - Abnormal; Notable for the following components:    Protein, UA 1+ (*)     Ketones, UA Trace (*)     Urobilinogen, UA 2.0-3.0 (*)     Leukocytes, UA Trace (*)     All other components within normal limits    Narrative:     Specimen Source->Urine   URINALYSIS MICROSCOPIC - Abnormal; Notable for the following components:    WBC, UA 7 (*)     Hyaline Casts, UA 17 (*)     All other components within normal limits    Narrative:     Specimen Source->Urine   TROPONIN I HIGH SENSITIVITY   TROPONIN I HIGH SENSITIVITY        ECG Results              EKG 12-lead (In process)  Result time 04/18/23 05:07:12      In process by Interface, Lab In [facility] (04/18/23 05:07:12)                   Narrative:    Test Reason : R07.89,    Vent. Rate : 067 BPM     Atrial Rate : 067 BPM     P-R Int : 248 ms          QRS Dur : 064 ms      QT Int : 378 ms       P-R-T Axes : 059 007 254 degrees     QTc Int : 399 ms    Sinus rhythm with 1st degree A-V block  Septal infarct ,age undetermined  ST and T wave abnormality, consider inferior ischemia  ST and T wave abnormality, consider anterolateral ischemia  Abnormal ECG  When compared with ECG of 28-APR-1992 14:48,  ST now depressed in Anterior leads  T wave inversion now evident in Anterior-lateral leads    Referred By: [email or web address]   SELF           Confirmed By:                                   Imaging Results              CT Head Without Contrast (Final result)  Result time 04/17/23 21:30:53      Final result by Jalen Mcdaniel MD (04/17/23 21:30:53)                   Narrative:    EXAM DESCRIPTION:  CT HEAD WITHOUT CONTRAST    CLINICAL HISTORY:  Facial trauma, blunt; Pt fell on head.    COMPARISON:  None Available.    TECHNIQUE:    Multiple helical axial tomographic images were obtained of the head  without intravenous contrast. This exam was performed according to our departmental dose-optimization program, which includes automated exposure control, adjustment of the mA and/or kV according to patient size and/or use of iterative reconstruction technique.    FINDINGS:    Generalized brain volume loss is demonstrated. There is patchy hypoattenuation in the cerebral white matter suggesting chronic microvascular ischemic changes. There is no acute intracranial hemorrhage. No mass. No midline shift. No ventriculomegaly. Gray-white matter differentiation is maintained.    Paranasal sinuses are clear. Mastoid air cells and middle ear spaces are clear. Changes of lens replacement noted.    Osseous structures are unremarkable. Surrounding soft tissues are unremarkable.    IMPRESSION:    No acute intracranial process.    Electronically signed by:  Jalen Mcdaniel MD  4/17/2023 9:30 PM CDT Workstation: [id number]                                     XR Ribs Min 3 Views w/PA Chest Right (Final result)  Result time 04/17/23 23:58:36   Procedure changed from X-Ray Chest AP Portable     Final result by Lee Britton MD (04/17/23 23:58:36)                   Narrative:    Chest and right rib x-ray 3 views on 4/17/2023    CLINICAL INDICATION: Right rib pain after fall    COMPARISON: Chest x-ray from 1/31/2019    FINDINGS: There is mild linear atelectasis or scarring in the lung bases again noted. Stable calcification projects over the right upper lateral chest. Vascular calcification is noted in the aorta. Cardiac, hilar and mediastinal contours are within normal limits. The lungs are otherwise clear. There is no pneumothorax or pleural effusion. No acute right rib fracture is noted.    IMPRESSION:  No active disease and no acute right rib fracture.    Electronically signed by:  Jose L Britton  4/17/2023 11:58 PM CDT Workstation: [id number]                                     US Abdomen Limited (Final result)  Result time  04/17/23 20:42:43      Final result by Rich Altman MD (04/17/23 20:42:43)                   Narrative:    EXAM DESCRIPTION:  US ABDOMEN LIMITED    CLINICAL HISTORY:  89 years  Female  right upper quadrant    COMPARISON:  None    TECHNIQUE:  Ultrasound of the right upper quadrant was performed.      FINDINGS:    Liver: The liver is not enlarged and is within normal limits in echogenicity.    Gallbladder/bile ducts: No shadowing gallstones, wall thickening or pericholecystic edema.  The sonographic Bateman's sign is negative. The common bile duct measures up to 0.3cm.    Pancreas: Visualized portions of the pancreatic head, neck and proximal body are unremarkable without ductal dilation. The remaining body and tail are obscured by overlying bowel gas and cannot be visualized.    Right kidney: Measures 8.4 x 4.7 x 4.5cm. No hydronephrosis or conspicuous concerning lesion. Nonobstructing interpolar region 0.2 cm and 0.3 cm calculi, potentially in the renal cortex or medulla.    Aorta: No aortic aneurysm is visualized.    IVC: Unremarkable.    Other: No ascites.    IMPRESSION:  No acute findings.        Electronically signed by:  Rich Altman MD  4/17/2023 8:42 PM CDT Workstation: [id number]                                     Medications - No data to display  Medical Decision Making:   Initial Assessment:   Patient is a 89-year-old female history as noted above presenting to the emergency department with right-sided rib pain.  Concerned that patient Zosyn were drawn this than usual.  Patient afebrile, hypertensive otherwise hemodynamically stable.  Patient on physical exam without any reproducible rib pain, no overlying trauma appreciated.  Patient negative Bateman's.  Differential Diagnosis:   Differential includes rib fracture, cholecystitis, choledocholithiasis,  Clinical Tests:   Lab Tests: Ordered and Reviewed  Radiological Study: Ordered  ED Management:  Patient with ultrasound without any  gallbladder wall thickening, or common bile duct thickening.  EKG, normal sinus rhythm, no ST segment elevation or depression concerning for acute ACS at this time.  Patient currently pending urinalysis, repeat troponin.  If within normal limits plan for discharge.  Discussed plan with the family.  Also pending rib series.  Patient has been tolerating p.o. at home, patient without any fever chills or infectious symptoms.  Patient is hypertensive here in the emergency department although no evidence or signs of end-organ damage likely chronic and have discussed with family that she needs to have her blood pressure monitored at home.          Attending Attestation:   Physician Attestation Statement for Resident:  As the supervising MD   Physician Attestation Statement: I have personally seen and examined this patient.   I agree with the above history.  -:   As the supervising MD I agree with the above PE.     As the supervising MD I agree with the above treatment, course, plan, and disposition.    I have reviewed and agree with the residents interpretation of the following: lab data, x-rays, EKG and CT scans.               ED Course as of 04/18/23 1517   Mon Apr 17, 2023 2052 Troponin I High Sensitivity: 7.7 [JV]   2052 Lipase(!): 80 [JV]   2320 Troponin I High Sensitivity: 7.1 [JV]   2337 Protein, UA(!): 1+ [JV]   2337 Ketones, UA(!): Trace [JV]   2337 WBC, UA(!): 7 [JV]   2337 Hyaline Casts, UA(!): 17 [JV]   2337 NITRITE UA: Negative [JV]   2337 Leukocytes, UA(!): Trace [JV]   2337 WBC, UA(!): 7 [JV]   Tue Apr 18, 2023   0003 No active rib fracture on dedicated rib series.  PA chest with a limits without any acute intra cardiopulmonary injury.  Findings discussed with the patient's family bedside.  Discussed return precautions with family bedside and they are comfortable with the plan for discharge.   IMPRESSION:  No active disease and no acute right rib fracture. [JV]      ED Course User Index  [JV] Lenin  MD Brenden                 Clinical Impression:   Final diagnoses:  [R07.81] Rib pain  [R07.89] Chest wall pain        ED Disposition Condition    Discharge Stable          ED Prescriptions       Medication Sig Dispense Start Date End Date Auth. Provider    cephALEXin (KEFLEX) 500 MG capsule Take 1 capsule (500 mg total) by mouth 4 (four) times daily. for 5 days 20 capsule 4/17/2023 4/22/2023 Lenin Wilcox MD          Follow-up Information       Follow up With Specialties Details Why Contact Info Additional Information    Ollie Bartlett MD Family Medicine Schedule an appointment as soon as possible for a visit  For follow-up to emergency department visit [address] [facility] [address]  [phone]       [facility] - Emergency Dept Emergency Medicine Go to  If symptoms worsen [address] [facility] [address]  [phone] 1st floor             Lenin Wilcox MD  Resident  04/18/23 0004       Ange Esposito MD  04/18/23 [id number]

## 2023-04-18 VITALS
BODY MASS INDEX: 23.9 KG/M2 | WEIGHT: 140 LBS | RESPIRATION RATE: 18 BRPM | DIASTOLIC BLOOD PRESSURE: 86 MMHG | OXYGEN SATURATION: 93 % | HEART RATE: 64 BPM | TEMPERATURE: 98 F | HEIGHT: 64 IN | SYSTOLIC BLOOD PRESSURE: 184 MMHG

## 2023-04-18 NOTE — DISCHARGE INSTRUCTIONS
You were seen in the emergency department today for rib pain.  All labs here to evaluate for any abdominal injury, infectious causes without any significant findings.  Return to the emergency department if any fevers, decreased appetite.  Acute declining mental status.    Follow up with primary care provider.

## 2023-09-08 DIAGNOSIS — E78.2 MIXED HYPERLIPIDEMIA: ICD-10-CM

## 2023-09-08 RX ORDER — LOVASTATIN 20 MG/1
TABLET ORAL
Qty: 90 TABLET | Refills: 0 | Status: SHIPPED | OUTPATIENT
Start: 2023-09-08

## 2023-09-08 RX ORDER — DONEPEZIL HYDROCHLORIDE 10 MG/1
10 TABLET, FILM COATED ORAL NIGHTLY
Qty: 90 TABLET | Refills: 2 | Status: SHIPPED | OUTPATIENT
Start: 2023-09-08 | End: 2024-02-15 | Stop reason: ALTCHOICE

## 2023-09-08 NOTE — TELEPHONE ENCOUNTER
Refill Routing Note   Medication(s) are not appropriate for processing by [facility] for the following reason(s):      Medication outside of protocol  Required labs outdated    [facility] action(s):  Defer  Route Care Due:  None identified              Appointments  past 12m or future 3m with PCP    Date Provider   Last Visit   7/20/2022 Ollie Bartlett MD   Next Visit   Visit date not found Ollie Bartlett MD   ED visits in past 90 days: 0        Note composed:11:50 AM 09/08/2023

## 2024-01-19 DIAGNOSIS — E78.2 MIXED HYPERLIPIDEMIA: ICD-10-CM

## 2024-01-19 RX ORDER — LOVASTATIN 20 MG/1
TABLET ORAL
Qty: 90 TABLET | Refills: 0 | OUTPATIENT
Start: 2024-01-19

## 2024-01-19 NOTE — TELEPHONE ENCOUNTER
Care Due:                  Date            Visit Type   Department     Provider  --------------------------------------------------------------------------------                                EP -                              PRIMARY      [facility]  Last Visit: 07-      CARE ([facility])   MEDICINE       Ollie Bartlett  Next Visit: None Scheduled  None         None Found                                                            Last  Test          Frequency    Reason                     Performed    Due Date  --------------------------------------------------------------------------------    CMP.........  12 months..  losartan, lovastatin.....  04- 04-    [facility] Embedded Care Due Messages. Reference number: [id number].   1/19/2024 10:32:26 AM CST

## 2024-01-22 ENCOUNTER — TELEPHONE (OUTPATIENT)
Dept: FAMILY MEDICINE | Facility: CLINIC | Age: 89
End: 2024-01-22
Payer: MEDICARE

## 2024-01-22 NOTE — TELEPHONE ENCOUNTER
Gini Carranza, Patient Care Assistant  [facility] Staff     ----- Message from Gini Carranza, Patient Care Assistant sent at 1/22/2024  1:53 PM CST -----  Contact: Christine King is asking  if the office can fill a partial RX for amLODIPine (NORVASC) 2.5 MG tablet,  lovastatin (MEVACOR) 20 MG tablet   [facility] [id number] [place] [address] & [address] [address]  Phone: [phone] Fax: [phone]  Please call when med [facility] [phone]  Thanks

## 2024-01-22 NOTE — TELEPHONE ENCOUNTER
Call placed to Christine (patient's daughter-in-law) who states patient is completely out of Amlodipine.  Mrs. King states she contacted pharmacy on Friday and was advised by pharmacy the provider's office denied refill of Amlodipine.  Upon further assessment it was noted a prescription for Amlodipine was e-scribed to [facility] on 4-12-23 quantity of 90 tablets (3 month supply) with 3 additional refills (for a total of 12 months worth of medication).  Call placed to [facility] spoke to Hilaria who confirmed patient has an available refill on file.  Mrs. Manrique states she will process refill for .  This has been fully explained to the patient's daughter-in-law (Christine), who indicates understanding.  Mrs. King indicated patient has enough Lovastatin on hand to last until upcoming office visit.         Outpatient Medication Detail     Disp Refills Start End ERIK   amLODIPine (NORVASC) 2.5 MG tablet 90 tablet 3 4/12/2023 -- No   Sig - Route: Take 1 tablet (2.5 mg total) by mouth once daily. - Oral   Sent to pharmacy as: amLODIPine (NORVASC) 2.5 MG tablet   Class: Normal   Notes to Pharmacy: .   Order: [id number]   Date/Time Signed: 4/12/2023 09:28       E-Prescribing Status: Receipt confirmed by pharmacy (4/12/2023  9:28 AM CDT)     Pharmacy    [facility] #[id number] - [address]

## 2024-01-24 ENCOUNTER — OFFICE VISIT (OUTPATIENT)
Dept: FAMILY MEDICINE | Facility: CLINIC | Age: 89
End: 2024-01-24
Payer: MEDICARE

## 2024-01-24 VITALS
WEIGHT: 119.25 LBS | SYSTOLIC BLOOD PRESSURE: 160 MMHG | TEMPERATURE: 98 F | HEART RATE: 52 BPM | HEIGHT: 64 IN | DIASTOLIC BLOOD PRESSURE: 80 MMHG | BODY MASS INDEX: 20.36 KG/M2 | OXYGEN SATURATION: 95 %

## 2024-01-24 DIAGNOSIS — H61.23 BILATERAL IMPACTED CERUMEN: ICD-10-CM

## 2024-01-24 DIAGNOSIS — I10 ESSENTIAL HYPERTENSION: Primary | ICD-10-CM

## 2024-01-24 DIAGNOSIS — F01.50 VASCULAR DEMENTIA WITHOUT BEHAVIORAL DISTURBANCE: ICD-10-CM

## 2024-01-24 DIAGNOSIS — N18.32 STAGE 3B CHRONIC KIDNEY DISEASE: ICD-10-CM

## 2024-01-24 DIAGNOSIS — J41.8 MIXED SIMPLE AND MUCOPURULENT CHRONIC BRONCHITIS: ICD-10-CM

## 2024-01-24 DIAGNOSIS — R29.6 FREQUENT FALLS: ICD-10-CM

## 2024-01-24 DIAGNOSIS — H35.3231 EXUDATIVE AGE-RELATED MACULAR DEGENERATION, BILATERAL, WITH ACTIVE CHOROIDAL NEOVASCULARIZATION: ICD-10-CM

## 2024-01-24 DIAGNOSIS — R42 DIZZINESS: ICD-10-CM

## 2024-01-24 DIAGNOSIS — I70.0 ABDOMINAL AORTIC ATHEROSCLEROSIS: ICD-10-CM

## 2024-01-24 DIAGNOSIS — T78.40XA ALLERGY, INITIAL ENCOUNTER: ICD-10-CM

## 2024-01-24 PROCEDURE — 1100F PTFALLS ASSESS-DOCD GE2>/YR: CPT | Mod: CPTII,S$GLB,,

## 2024-01-24 PROCEDURE — 1126F AMNT PAIN NOTED NONE PRSNT: CPT | Mod: CPTII,S$GLB,,

## 2024-01-24 PROCEDURE — 99215 OFFICE O/P EST HI 40 MIN: CPT | Mod: S$GLB,,,

## 2024-01-24 PROCEDURE — 1160F RVW MEDS BY RX/DR IN RCRD: CPT | Mod: CPTII,S$GLB,,

## 2024-01-24 PROCEDURE — 1159F MED LIST DOCD IN RCRD: CPT | Mod: CPTII,S$GLB,,

## 2024-01-24 PROCEDURE — 99999 PR PBB SHADOW E&M-EST. PATIENT-LVL V: CPT | Mod: PBBFAC,,,

## 2024-01-24 PROCEDURE — 3288F FALL RISK ASSESSMENT DOCD: CPT | Mod: CPTII,S$GLB,,

## 2024-01-24 RX ORDER — LEVOCETIRIZINE DIHYDROCHLORIDE 5 MG/1
5 TABLET, FILM COATED ORAL NIGHTLY PRN
Qty: 30 TABLET | Refills: 11 | Status: SHIPPED | OUTPATIENT
Start: 2024-01-24 | End: 2025-01-23

## 2024-01-24 RX ORDER — AMLODIPINE BESYLATE 5 MG/1
5 TABLET ORAL DAILY
Qty: 90 TABLET | Refills: 3 | Status: SHIPPED | OUTPATIENT
Start: 2024-01-24 | End: 2025-01-23

## 2024-01-24 NOTE — PROGRESS NOTES
Subjective:       Patient ID: Marietta Gates is a 89 y.o. female.    Chief Complaint: Medication Refill, Dizziness, and Fall    Presents accompanied by daughter who acts as her historian. Daughter wants for patient to be evaluated to make sure we aren't missing anything. She has been having more frequent falls due to dizziness lately. Also decreased hearing. Hx of copious amounts of cerumen in bilateral ears. Previously saw ENT for removal. Also her sinuses are acting up lately. Daughter wonders what the patient can take.     HTN. Slightly elevated at this time.     Cough occasionally. Feels like breathing is stable. Uses albuterol PRN for wheezing. Patient still smoking 5 cigarettes a day.         Past Medical History:   Diagnosis Date    Cataract     COPD (chronic obstructive pulmonary disease)     GERD (gastroesophageal reflux disease)     Hyperlipidemia     Hypertension     Osteoporosis     Thyroid disease        Review of patient's allergies indicates:   Allergen Reactions    Indomethacin Rash         Current Outpatient Medications:     ascorbic acid, vitamin C, (VITAMIN C) 500 MG tablet, Take 500 mg by mouth once daily., Disp: , Rfl:     buPROPion (WELLBUTRIN XL) 300 MG 24 hr tablet, Take 1 tablet (300 mg total) by mouth once daily., Disp: 90 tablet, Rfl: 3    calcium carbonate (OS-KATELYN) 500 mg calcium (1,250 mg) chewable tablet, Take 2 tablets by mouth once daily. 300 mg gummy chewable, Disp: , Rfl:     losartan (COZAAR) 100 MG tablet, Take 1 tablet (100 mg total) by mouth once daily., Disp: 90 tablet, Rfl: 3    lovastatin (MEVACOR) 20 MG tablet, TAKE 1 TABLET(20 MG) BY MOUTH EVERY EVENING, Disp: 90 tablet, Rfl: 0    multivitamin capsule, Take 1 capsule by mouth once daily., Disp: , Rfl:     UNABLE TO FIND, Take 1,053 mg by mouth once daily. Brain support, Disp: , Rfl:     vit A/vit C/vit E/zinc/copper (OCUVITE PRESERVISION ORAL), Take 2 tablets by mouth once daily., Disp: , Rfl:     albuterol (ACCUNEB)  "1.25 mg/3 mL Nebu, Take 1.25 mg by nebulization every 6 (six) hours as needed., Disp: , Rfl:     amLODIPine (NORVASC) 5 MG tablet, Take 1 tablet (5 mg total) by mouth once daily., Disp: 90 tablet, Rfl: 3    donepeziL (ARICEPT) 10 MG tablet, TAKE 1 TABLET(10 MG) BY MOUTH EVERY EVENING (Patient not taking: Reported on 1/24/2024), Disp: 90 tablet, Rfl: 2    levocetirizine (XYZAL) 5 MG tablet, Take 1 tablet (5 mg total) by mouth nightly as needed for Allergies., Disp: 30 tablet, Rfl: 11    omeprazole (PRILOSEC) 20 MG capsule, Take 1 capsule (20 mg total) by mouth once daily. (Patient not taking: Reported on 1/24/2024), Disp: 90 capsule, Rfl: 3    ondansetron (ZOFRAN-ODT) 4 MG TbDL, Take 1 tablet (4 mg total) by mouth every 12 (twelve) hours as needed (N/V). (Patient not taking: Reported on 1/24/2024), Disp: 20 tablet, Rfl: 0    Review of Systems   HENT:  Positive for congestion, hearing loss, rhinorrhea and sinus pressure.    Respiratory:  Positive for cough and shortness of breath (with exertion).    Neurological:  Positive for dizziness.   Psychiatric/Behavioral:  Positive for confusion.        Objective:      BP (!) 160/80 (BP Location: Right arm, Patient Position: Sitting, BP Method: Medium (Manual))   Pulse (!) 52   Temp 97.8 °F (36.6 °C) (Oral)   Ht 5' 4" (1.626 m)   Wt 54.1 kg (119 lb 4.3 oz)   SpO2 95%   BMI 20.47 kg/m²   Physical Exam  Vitals reviewed.   Constitutional:       General: She is not in acute distress.     Appearance: Normal appearance. She is not ill-appearing, toxic-appearing or diaphoretic.   HENT:      Head: Normocephalic.      Right Ear: External ear normal.      Left Ear: External ear normal.      Nose: Nose normal. No congestion or rhinorrhea.      Mouth/Throat:      Mouth: Mucous membranes are moist.      Pharynx: Oropharynx is clear.   Eyes:      General: No scleral icterus.        Right eye: No discharge.         Left eye: No discharge.      Extraocular Movements: Extraocular " movements intact.      Conjunctiva/sclera: Conjunctivae normal.   Cardiovascular:      Rate and Rhythm: Regular rhythm. Bradycardia present.      Pulses: Normal pulses.      Heart sounds: Normal heart sounds. No murmur heard.     No friction rub. No gallop.   Pulmonary:      Effort: Pulmonary effort is normal. No respiratory distress.      Breath sounds: No wheezing, rhonchi or rales.      Comments: Decreased breath sounds throughout   Chest:      Chest wall: No tenderness.   Musculoskeletal:         General: No swelling, tenderness or deformity. Normal range of motion.      Cervical back: Normal range of motion.      Right lower leg: No edema.      Left lower leg: No edema.   Skin:     General: Skin is warm and dry.      Capillary Refill: Capillary refill takes less than 2 seconds.      Coloration: Skin is not jaundiced.      Findings: No bruising, erythema, lesion or rash.   Neurological:      Mental Status: She is alert and oriented to person, place, and time.   Psychiatric:         Mood and Affect: Mood normal.         Behavior: Behavior normal.         Assessment:       1. Essential hypertension    2. Dizziness    3. Vascular dementia without behavioral disturbance    4. Exudative age-related macular degeneration, bilateral, with active choroidal neovascularization    5. Stage 3b chronic kidney disease    6. Abdominal aortic atherosclerosis    7. Allergy, initial encounter    8. Frequent falls    9. Mixed simple and mucopurulent chronic bronchitis    10. Bilateral impacted cerumen        Plan:       Essential hypertension  -     amLODIPine (NORVASC) 5 MG tablet; Take 1 tablet (5 mg total) by mouth once daily.  Dispense: 90 tablet; Refill: 3  -     Ambulatory referral/consult to Home Health; Future; Expected date: 01/25/2024        -     Increase amlodipine.     Dizziness  -     levocetirizine (XYZAL) 5 MG tablet; Take 1 tablet (5 mg total) by mouth nightly as needed for Allergies.  Dispense: 30 tablet; Refill:  11    Vascular dementia without behavioral disturbance  -     Ambulatory referral/consult to Home Health; Future; Expected date: 01/25/2024    Exudative age-related macular degeneration, bilateral, with active choroidal neovascularization        -    Continue meds. Will continue to monitor.     Stage 3b chronic kidney disease        -    Continue meds. Will continue to monitor.     Abdominal aortic atherosclerosis        -    Stable. Continue meds. Will continue to monitor.     Allergy, initial encounter  -     levocetirizine (XYZAL) 5 MG tablet; Take 1 tablet (5 mg total) by mouth nightly as needed for Allergies.  Dispense: 30 tablet; Refill: 11    Frequent falls  -     Ambulatory referral/consult to Home Health; Future; Expected date: 01/25/2024    Mixed simple and mucopurulent chronic bronchitis        -    Continue meds. Will continue to monitor.     Bilateral impacted cerumen        -    Follow up ENT. Debrox prior to seeing ENT.                Helio Ramirez PA-C  Family Medicine Physician Assistant       Future Appointments       Date Provider Specialty Appt Notes    7/29/2024 Ollie Bartlett MD Family Medicine annual               I spent a total of 40 minutes on the day of the visit.This includes face to face time and non-face to face time preparing to see the patient (eg, review of tests), obtaining and/or reviewing separately obtained history, documenting clinical information in the electronic or other health record, independently interpreting results and communicating results to the patient/family/caregiver, or care coordinator.      We have addressed [4] Moderate: 1 or more chronic illnesses with exacerbation, progression, or side effects of treatment / 2 or more stable chronic illnesses / 1 undiagnosed new problem with uncertain prognosis / 1 acute illness with systemic symptoms / 1 acute complicated injury  The complexity of the data reviewed and analyzed for this visit was [2] Minimal or  None  The risk of complications and/or morbidity or mortality are [4] Moderate risk (I.e. prescription drug management / decision regarding minor surgery with identified pt or procedure risk factors / decision regarding elective major surgery without identified pt or procedure risk factors / diagnosis or treatment significantly limited by social determinants of health)   The level of Medical Decision Making for this visit is [4] Moderate

## 2024-01-24 NOTE — PATIENT INSTRUCTIONS
"Debrox over the counter       Jose Luis Mcmanus,     If you are due for any health screening(s) below please notify me so we can arrange them to be ordered and scheduled to maintain your health. Most healthy patients complete it. Don't lose out on improving your health.     All of your core healthy metrics are met.                          Patient Education       Checking Your Blood Pressure at Home   The Basics   Written by the doctors and editors at [facility]   How is blood pressure measured? -- Blood pressure is usually measured with a device that goes around your upper arm. This is often done in a doctor's office. But some people also check their blood pressure themselves, at home or at work.  Blood pressure is explained with 2 numbers. For instance, your blood pressure might be "140 over 90." The first (top) number is the pressure inside your arteries when your heart is srini. The second (bottom) number is the pressure inside your arteries when your heart is relaxed. The table shows how doctors and nurses define high and normal blood pressure (table 1).  If your blood pressure gets too high, it puts you at risk for heart attack, stroke, and kidney disease. High blood pressure does not usually cause symptoms. But it can be serious.  What is a home blood pressure meter? -- A home blood pressure meter (or "monitor") is a device you can use to check your blood pressure yourself. It has a cuff that goes around your upper arm (figure 1). Some devices have a cuff that goes around your wrist instead. But doctors aren't sure if these work as well. The meter also has a small screen, or dial, that shows your blood pressure numbers.  There are also special meters you can wear for a day or 2. These are different because they automatically check your blood pressure throughout the day and night, even while you are sleeping. If your doctor thinks you should use one of these devices, they will talk to you about how to wear " it.  Why do I need to check my blood pressure at home? -- If your doctor knows or suspects that you have high blood pressure, they might want you to check it at home. There are a few reasons for this. Your doctor might want to look at:  Whether your blood pressure measures the same at home as it did in the doctor's office  How well your blood pressure medicines are working  Changes in your blood pressure, for example, if it goes up and down  People who check their own blood pressure at home usually do better at keeping it low.  How do I choose a home blood pressure meter? -- When choosing a home blood pressure meter, you will probably want to think about:  Cost - Some devices cost more than others. You should also check to see if your insurance will help pay for your device.  Size - It's important to make sure the cuff fits your arm comfortably. Your doctor or nurse can help you with this.  How easy it is to use - You should make sure you understand how to use the device. You also need to be able to read the numbers on the screen.  You do not need a prescription to buy a home blood pressure meter. You can buy them at most pharmacies or over the internet. Your doctor or nurse can help you choose the right device for you.  How do I check my blood pressure at home? -- Once you have a home blood pressure meter, your doctor or nurse should check it to make sure it fits you and works correctly.  When it's time to check your blood pressure:  Go to the bathroom and empty your bladder first. Having a full bladder can temporarily increase your blood pressure, making the results inaccurate.  Sit in a chair with your feet flat on the ground.  Try to breathe normally and stay calm.  Attach the cuff to your arm. Place the cuff directly on your skin, not over your clothing. The cuff should be tight enough to not slip down, but not uncomfortably tight.  Sit and relax for about 3 to 5 minutes with the cuff on.  Follow the directions  that came with your device to start measuring your blood pressure. This might involve squeezing the bulb at the end of the tube to inflate the cuff (fill it with air). With some monitors, you just need to press a button to inflate the cuff. When the cuff fills with air, it feels like someone is squeezing your arm, but it should not hurt. Then you will slowly deflate the cuff (let the air out of it), or it will deflate by itself. The screen or dial will show your blood pressure numbers.  Stay seated and relax for 1 minute, then measure your blood pressure again.  How often should I check my blood pressure? -- It depends. Different people need to follow different schedules. Your doctor or nurse will tell you how often to check your blood pressure, and when. Some people need to check their blood pressure twice a day, in the morning and evening.  Your doctor or nurse will probably tell you to keep track of your blood pressure for at least a few days (table 2). Then they will look at the numbers. The reason for this is that it's normal for your blood pressure to change a bit from day to day. For example, the numbers might change depending on whether you recently had caffeine, just exercised, or feel stressed. Checking your blood pressure over several days - or longer - will give your doctor or nurse a better idea of what is average for you.  How should I keep track of my blood pressure? -- Some blood pressure meters will record your numbers for you, or send them to your computer or smartphone. If yours does not do this, you will need to write them down. Your doctor or nurse can help you figure out the best way to keep track of the numbers.  What if my blood pressure is high? -- Your doctor or nurse will tell you what to do if your blood pressure is high when you check it at home. If you get a number that is higher than normal, measure it again to see if it is still high. If it is very high (above a certain number, which  "your doctor or nurse will tell you to watch out for), you should call your doctor right away.  If your blood pressure is only a little high, your doctor or nurse might tell you to keep checking it for a few more days or weeks, and then call if it does not go back down. Then they can help you decide what to do next.  All topics are updated as new evidence becomes available and our peer review process is complete.  This topic retrieved from [employer] on: Sep 21, 2021.  Topic 358783 Version 4.0  Release: 29.4.2 - C29.263  © 2021 UpToDate, Inc. and/or its affiliates. All rights reserved.  table 1: Definition of normal and high blood pressure  Level  Top number  Bottom number    High 130 or above 80 or above   Elevated 120 to 129 79 or below   Normal 119 or below 79 or below   These definitions are from the American College of Cardiology/American Heart Association. Other expert groups might use slightly different definitions.  "Elevated blood pressure" is a term doctor or nurses use as a warning. It means you do not yet have high blood pressure, but your blood pressure is not as low as it should be for good health.  Graphic 45483 Version 6.0  figure 1: Using a home blood pressure meter     This is an example of a person using a home blood pressure meter.  Graphic 437799 Version 1.0    table 2: 7-day diary for checking blood pressure at home  Day 1  Day 2  Day 3  Day 4  Day 5  Day 6  Day 7    Morning  1st read Morning  1st read Morning  1st read Morning  1st read Morning  1st read Morning  1st read Morning  1st read   Systolic: __________ Systolic: __________ Systolic: __________ Systolic: __________ Systolic: __________ Systolic: __________ Systolic: __________   Diastolic: __________ Diastolic: __________ Diastolic: __________ Diastolic: __________ Diastolic: __________ Diastolic: __________ Diastolic: __________   Pulse: __________ Pulse: __________ Pulse: __________ Pulse: __________ Pulse: __________ Pulse: __________ " Pulse: __________   Morning  2nd read Morning  2nd read Morning  2nd read Morning  2nd read Morning  2nd read Morning  2nd read Morning  2nd read   Systolic: __________ Systolic: __________ Systolic: __________ Systolic: __________ Systolic: __________ Systolic: __________ Systolic: __________   Diastolic: __________ Diastolic: __________ Diastolic: __________ Diastolic: __________ Diastolic: __________ Diastolic: __________ Diastolic: __________   Pulse: __________ Pulse: __________ Pulse: __________ Pulse: __________ Pulse: __________ Pulse: __________ Pulse: __________   Evening  1st read Evening  1st read Evening  1st read Evening  1st read Evening  1st read Evening  1st read Evening  1st read   Systolic: __________ Systolic: __________ Systolic: __________ Systolic: __________ Systolic: __________ Systolic: __________ Systolic: __________   Diastolic: __________ Diastolic: __________ Diastolic: __________ Diastolic: __________ Diastolic: __________ Diastolic: __________ Diastolic: __________   Pulse: __________ Pulse: __________ Pulse: __________ Pulse: __________ Pulse: __________ Pulse: __________ Pulse: __________   Evening  2nd read Evening  2nd read Evening  2nd read Evening  2nd read Evening  2nd read Evening  2nd read Evening  2nd read   Systolic: __________ Systolic: __________ Systolic: __________ Systolic: __________ Systolic: __________ Systolic: __________ Systolic: __________   Diastolic: __________ Diastolic: __________ Diastolic: __________ Diastolic: __________ Diastolic: __________ Diastolic: __________ Diastolic: __________   Pulse: __________ Pulse: __________ Pulse: __________ Pulse: __________ Pulse: __________ Pulse: __________ Pulse: __________   Notes    Notes    Notes    Notes    Notes    Notes    Notes      ____________________ ____________________ ____________________ ____________________ ____________________ ____________________ ____________________   ____________________  ____________________ ____________________ ____________________ ____________________ ____________________ ____________________   ____________________ ____________________ ____________________ ____________________ ____________________ ____________________ ____________________   Patient name: ______________________________     Patient ID: ________________________________    Primary care provider: _______________________    Average BP: _______________________________    [facility] [id number] Version 1.0  Consumer Information Use and Disclaimer   This information is not specific medical advice and does not replace information you receive from your health care provider. This is only a brief summary of general information. It does NOT include all information about conditions, illnesses, injuries, tests, procedures, treatments, therapies, discharge instructions or life-style choices that may apply to you. You must talk with your health care provider for complete information about your health and treatment options. This information should not be used to decide whether or not to accept your health care provider's advice, instructions or recommendations. Only your health care provider has the knowledge and training to provide advice that is right for you. The use of this information is governed by the [employer] End User License Agreement, available at [email or web address]://[email or web address]/solutions/UseTogether/about/[email or web address].The use of [employer] content is governed by the [employer] Terms of Use. ©2021 UpToDate, Inc. All rights reserved.  Copyright   © 2021 UpToDate, Inc. and/or its affiliates. All rights reserved.

## 2024-02-02 DIAGNOSIS — J41.8 MIXED SIMPLE AND MUCOPURULENT CHRONIC BRONCHITIS: Primary | ICD-10-CM

## 2024-02-14 ENCOUNTER — HOSPITAL ENCOUNTER (INPATIENT)
Facility: HOSPITAL | Age: 89
LOS: 6 days | Discharge: NURSING, REHAB OR LONG-TERM CARE | DRG: 535 | End: 2024-02-21
Attending: EMERGENCY MEDICINE | Admitting: INTERNAL MEDICINE
Payer: MEDICARE

## 2024-02-14 DIAGNOSIS — T40.604A OPIATE OVERDOSE, UNDETERMINED INTENT, INITIAL ENCOUNTER: ICD-10-CM

## 2024-02-14 DIAGNOSIS — R07.9 CHEST PAIN: ICD-10-CM

## 2024-02-14 DIAGNOSIS — S32.592A CLOSED FRACTURE OF RAMUS OF LEFT PUBIS, INITIAL ENCOUNTER: Primary | ICD-10-CM

## 2024-02-14 DIAGNOSIS — S32.82XA MULTIPLE CLOSED STABLE LATERAL COMPRESSION FRACTURES OF PELVIS, INITIAL ENCOUNTER: ICD-10-CM

## 2024-02-14 PROCEDURE — 82962 GLUCOSE BLOOD TEST: CPT

## 2024-02-14 PROCEDURE — 99291 CRITICAL CARE FIRST HOUR: CPT

## 2024-02-14 PROCEDURE — 96375 TX/PRO/DX INJ NEW DRUG ADDON: CPT

## 2024-02-14 PROCEDURE — 63600175 PHARM REV CODE 636 W HCPCS

## 2024-02-14 PROCEDURE — 63600175 PHARM REV CODE 636 W HCPCS: Performed by: STUDENT IN AN ORGANIZED HEALTH CARE EDUCATION/TRAINING PROGRAM

## 2024-02-14 PROCEDURE — 63600175 PHARM REV CODE 636 W HCPCS: Performed by: EMERGENCY MEDICINE

## 2024-02-14 PROCEDURE — 96374 THER/PROPH/DIAG INJ IV PUSH: CPT

## 2024-02-14 RX ORDER — ONDANSETRON HYDROCHLORIDE 2 MG/ML
4 INJECTION, SOLUTION INTRAVENOUS
Status: COMPLETED | OUTPATIENT
Start: 2024-02-14 | End: 2024-02-14

## 2024-02-14 RX ORDER — MORPHINE SULFATE 2 MG/ML
2 INJECTION, SOLUTION INTRAMUSCULAR; INTRAVENOUS
Status: COMPLETED | OUTPATIENT
Start: 2024-02-14 | End: 2024-02-14

## 2024-02-14 RX ORDER — NALOXONE HCL 0.4 MG/ML
VIAL (ML) INJECTION
Status: COMPLETED
Start: 2024-02-14 | End: 2024-02-14

## 2024-02-14 RX ORDER — NALOXONE HCL 0.4 MG/ML
0.1 VIAL (ML) INJECTION
Status: COMPLETED | OUTPATIENT
Start: 2024-02-15 | End: 2024-02-14

## 2024-02-14 RX ADMIN — ONDANSETRON 4 MG: 2 INJECTION INTRAMUSCULAR; INTRAVENOUS at 10:02

## 2024-02-14 RX ADMIN — NALXONE HYDROCHLORIDE 0.1 MG: 0.4 INJECTION INTRAMUSCULAR; INTRAVENOUS; SUBCUTANEOUS at 11:02

## 2024-02-14 RX ADMIN — Medication 0.1 MG: at 11:02

## 2024-02-14 RX ADMIN — MORPHINE SULFATE 2 MG: 2 INJECTION, SOLUTION INTRAMUSCULAR; INTRAVENOUS at 10:02

## 2024-02-15 PROBLEM — S32.82XA MULTIPLE STABLE CLOSED LATERAL COMPRESSION FRACTURES OF PELVIS: Status: ACTIVE | Noted: 2024-02-15

## 2024-02-15 PROBLEM — G92.9 ENCEPHALOPATHY, TOXIC: Status: ACTIVE | Noted: 2024-02-15

## 2024-02-15 PROBLEM — S72.002A CLOSED FRACTURE OF LEFT HIP: Status: ACTIVE | Noted: 2024-02-15

## 2024-02-15 PROBLEM — E87.6 HYPOKALEMIA: Status: ACTIVE | Noted: 2024-02-15

## 2024-02-15 LAB
25(OH)D3+25(OH)D2 SERPL-MCNC: 25 NG/ML (ref 30–96)
ALBUMIN SERPL BCP-MCNC: 3.4 G/DL (ref 3.5–5.2)
ALBUMIN SERPL BCP-MCNC: 3.7 G/DL (ref 3.5–5.2)
ALP SERPL-CCNC: 52 U/L (ref 55–135)
ALP SERPL-CCNC: 61 U/L (ref 55–135)
ALT SERPL W/O P-5'-P-CCNC: 10 U/L (ref 10–44)
ALT SERPL W/O P-5'-P-CCNC: 14 U/L (ref 10–44)
ANION GAP SERPL CALC-SCNC: 10 MMOL/L (ref 8–16)
ANION GAP SERPL CALC-SCNC: 9 MMOL/L (ref 8–16)
AST SERPL-CCNC: 14 U/L (ref 10–40)
AST SERPL-CCNC: 16 U/L (ref 10–40)
BACTERIA #/AREA URNS HPF: NEGATIVE /HPF
BASOPHILS # BLD AUTO: 0.04 K/UL (ref 0–0.2)
BASOPHILS # BLD AUTO: 0.06 K/UL (ref 0–0.2)
BASOPHILS NFR BLD: 0.3 % (ref 0–1.9)
BASOPHILS NFR BLD: 0.4 % (ref 0–1.9)
BILIRUB SERPL-MCNC: 0.5 MG/DL (ref 0.1–1)
BILIRUB SERPL-MCNC: 0.6 MG/DL (ref 0.1–1)
BILIRUB UR QL STRIP: NEGATIVE
BUN SERPL-MCNC: 20 MG/DL (ref 10–30)
BUN SERPL-MCNC: 23 MG/DL (ref 10–30)
CALCIUM SERPL-MCNC: 8.7 MG/DL (ref 8.7–10.5)
CALCIUM SERPL-MCNC: 9.2 MG/DL (ref 8.7–10.5)
CHLORIDE SERPL-SCNC: 103 MMOL/L (ref 95–110)
CHLORIDE SERPL-SCNC: 98 MMOL/L (ref 95–110)
CLARITY UR: ABNORMAL
CO2 SERPL-SCNC: 22 MMOL/L (ref 23–29)
CO2 SERPL-SCNC: 28 MMOL/L (ref 23–29)
COLOR UR: YELLOW
CREAT SERPL-MCNC: 1.2 MG/DL (ref 0.5–1.4)
CREAT SERPL-MCNC: 1.3 MG/DL (ref 0.5–1.4)
DIFFERENTIAL METHOD BLD: ABNORMAL
DIFFERENTIAL METHOD BLD: ABNORMAL
EOSINOPHIL # BLD AUTO: 0 K/UL (ref 0–0.5)
EOSINOPHIL # BLD AUTO: 0 K/UL (ref 0–0.5)
EOSINOPHIL NFR BLD: 0 % (ref 0–8)
EOSINOPHIL NFR BLD: 0.3 % (ref 0–8)
ERYTHROCYTE [DISTWIDTH] IN BLOOD BY AUTOMATED COUNT: 13 % (ref 11.5–14.5)
ERYTHROCYTE [DISTWIDTH] IN BLOOD BY AUTOMATED COUNT: 13.1 % (ref 11.5–14.5)
EST. GFR  (NO RACE VARIABLE): 38.6 ML/MIN/1.73 M^2
EST. GFR  (NO RACE VARIABLE): 42.5 ML/MIN/1.73 M^2
GLUCOSE SERPL-MCNC: 120 MG/DL (ref 70–110)
GLUCOSE SERPL-MCNC: 136 MG/DL (ref 70–110)
GLUCOSE SERPL-MCNC: 95 MG/DL (ref 70–110)
GLUCOSE UR QL STRIP: NEGATIVE
HCT VFR BLD AUTO: 39.3 % (ref 37–48.5)
HCT VFR BLD AUTO: 42.3 % (ref 37–48.5)
HGB BLD-MCNC: 12.6 G/DL (ref 12–16)
HGB BLD-MCNC: 13.7 G/DL (ref 12–16)
HGB UR QL STRIP: NEGATIVE
HYALINE CASTS #/AREA URNS LPF: 72 /LPF
IMM GRANULOCYTES # BLD AUTO: 0.1 K/UL (ref 0–0.04)
IMM GRANULOCYTES # BLD AUTO: 0.11 K/UL (ref 0–0.04)
IMM GRANULOCYTES NFR BLD AUTO: 0.8 % (ref 0–0.5)
IMM GRANULOCYTES NFR BLD AUTO: 0.8 % (ref 0–0.5)
KETONES UR QL STRIP: ABNORMAL
LEUKOCYTE ESTERASE UR QL STRIP: NEGATIVE
LYMPHOCYTES # BLD AUTO: 0.7 K/UL (ref 1–4.8)
LYMPHOCYTES # BLD AUTO: 1 K/UL (ref 1–4.8)
LYMPHOCYTES NFR BLD: 6.1 % (ref 18–48)
LYMPHOCYTES NFR BLD: 7.3 % (ref 18–48)
MAGNESIUM SERPL-MCNC: 2 MG/DL (ref 1.6–2.6)
MAGNESIUM SERPL-MCNC: 2.1 MG/DL (ref 1.6–2.6)
MCH RBC QN AUTO: 30.9 PG (ref 27–31)
MCH RBC QN AUTO: 31.6 PG (ref 27–31)
MCHC RBC AUTO-ENTMCNC: 32.1 G/DL (ref 32–36)
MCHC RBC AUTO-ENTMCNC: 32.4 G/DL (ref 32–36)
MCV RBC AUTO: 96 FL (ref 82–98)
MCV RBC AUTO: 98 FL (ref 82–98)
MICROSCOPIC COMMENT: ABNORMAL
MONOCYTES # BLD AUTO: 0.7 K/UL (ref 0.3–1)
MONOCYTES # BLD AUTO: 0.7 K/UL (ref 0.3–1)
MONOCYTES NFR BLD: 5.4 % (ref 4–15)
MONOCYTES NFR BLD: 5.8 % (ref 4–15)
NEUTROPHILS # BLD AUTO: 10.3 K/UL (ref 1.8–7.7)
NEUTROPHILS # BLD AUTO: 11.5 K/UL (ref 1.8–7.7)
NEUTROPHILS NFR BLD: 85.8 % (ref 38–73)
NEUTROPHILS NFR BLD: 87 % (ref 38–73)
NITRITE UR QL STRIP: NEGATIVE
NRBC BLD-RTO: 0 /100 WBC
NRBC BLD-RTO: 0 /100 WBC
PH UR STRIP: 6 [PH] (ref 5–8)
PLATELET # BLD AUTO: 258 K/UL (ref 150–450)
PLATELET # BLD AUTO: 321 K/UL (ref 150–450)
PMV BLD AUTO: 10.2 FL (ref 9.2–12.9)
PMV BLD AUTO: 10.9 FL (ref 9.2–12.9)
POTASSIUM SERPL-SCNC: 3.4 MMOL/L (ref 3.5–5.1)
POTASSIUM SERPL-SCNC: 4.3 MMOL/L (ref 3.5–5.1)
PROT SERPL-MCNC: 6.6 G/DL (ref 6–8.4)
PROT SERPL-MCNC: 7.6 G/DL (ref 6–8.4)
PROT UR QL STRIP: ABNORMAL
RBC # BLD AUTO: 4.08 M/UL (ref 4–5.4)
RBC # BLD AUTO: 4.34 M/UL (ref 4–5.4)
RBC #/AREA URNS HPF: 6 /HPF (ref 0–4)
SODIUM SERPL-SCNC: 135 MMOL/L (ref 136–145)
SODIUM SERPL-SCNC: 135 MMOL/L (ref 136–145)
SP GR UR STRIP: 1.03 (ref 1–1.03)
SQUAMOUS #/AREA URNS HPF: 5 /HPF
URN SPEC COLLECT METH UR: ABNORMAL
UROBILINOGEN UR STRIP-ACNC: ABNORMAL EU/DL
WBC # BLD AUTO: 11.82 K/UL (ref 3.9–12.7)
WBC # BLD AUTO: 13.42 K/UL (ref 3.9–12.7)
WBC #/AREA URNS HPF: 2 /HPF (ref 0–5)

## 2024-02-15 PROCEDURE — 12000002 HC ACUTE/MED SURGE SEMI-PRIVATE ROOM

## 2024-02-15 PROCEDURE — 85025 COMPLETE CBC W/AUTO DIFF WBC: CPT | Performed by: INTERNAL MEDICINE

## 2024-02-15 PROCEDURE — 85025 COMPLETE CBC W/AUTO DIFF WBC: CPT | Mod: 91 | Performed by: STUDENT IN AN ORGANIZED HEALTH CARE EDUCATION/TRAINING PROGRAM

## 2024-02-15 PROCEDURE — 80053 COMPREHEN METABOLIC PANEL: CPT | Mod: 91 | Performed by: STUDENT IN AN ORGANIZED HEALTH CARE EDUCATION/TRAINING PROGRAM

## 2024-02-15 PROCEDURE — 81001 URINALYSIS AUTO W/SCOPE: CPT | Performed by: INTERNAL MEDICINE

## 2024-02-15 PROCEDURE — 63600175 PHARM REV CODE 636 W HCPCS: Performed by: INTERNAL MEDICINE

## 2024-02-15 PROCEDURE — 97530 THERAPEUTIC ACTIVITIES: CPT

## 2024-02-15 PROCEDURE — 94640 AIRWAY INHALATION TREATMENT: CPT

## 2024-02-15 PROCEDURE — 83735 ASSAY OF MAGNESIUM: CPT | Mod: 91 | Performed by: STUDENT IN AN ORGANIZED HEALTH CARE EDUCATION/TRAINING PROGRAM

## 2024-02-15 PROCEDURE — 82306 VITAMIN D 25 HYDROXY: CPT | Performed by: INTERNAL MEDICINE

## 2024-02-15 PROCEDURE — 36415 COLL VENOUS BLD VENIPUNCTURE: CPT | Performed by: INTERNAL MEDICINE

## 2024-02-15 PROCEDURE — 80053 COMPREHEN METABOLIC PANEL: CPT | Performed by: INTERNAL MEDICINE

## 2024-02-15 PROCEDURE — 97165 OT EVAL LOW COMPLEX 30 MIN: CPT

## 2024-02-15 PROCEDURE — 99900035 HC TECH TIME PER 15 MIN (STAT)

## 2024-02-15 PROCEDURE — 97162 PT EVAL MOD COMPLEX 30 MIN: CPT

## 2024-02-15 PROCEDURE — 83735 ASSAY OF MAGNESIUM: CPT | Performed by: INTERNAL MEDICINE

## 2024-02-15 PROCEDURE — 99223 1ST HOSP IP/OBS HIGH 75: CPT | Mod: ,,, | Performed by: ORTHOPAEDIC SURGERY

## 2024-02-15 PROCEDURE — 25000003 PHARM REV CODE 250: Performed by: INTERNAL MEDICINE

## 2024-02-15 PROCEDURE — 94761 N-INVAS EAR/PLS OXIMETRY MLT: CPT

## 2024-02-15 PROCEDURE — 27000221 HC OXYGEN, UP TO 24 HOURS

## 2024-02-15 PROCEDURE — 25000242 PHARM REV CODE 250 ALT 637 W/ HCPCS: Performed by: INTERNAL MEDICINE

## 2024-02-15 PROCEDURE — 0T9B70Z DRAINAGE OF BLADDER WITH DRAINAGE DEVICE, VIA NATURAL OR ARTIFICIAL OPENING: ICD-10-PCS | Performed by: INTERNAL MEDICINE

## 2024-02-15 RX ORDER — POTASSIUM CHLORIDE 750 MG/1
10 CAPSULE, EXTENDED RELEASE ORAL DAILY
Status: DISCONTINUED | OUTPATIENT
Start: 2024-02-15 | End: 2024-02-21 | Stop reason: HOSPADM

## 2024-02-15 RX ORDER — AMOXICILLIN 250 MG
1 CAPSULE ORAL DAILY
Status: DISCONTINUED | OUTPATIENT
Start: 2024-02-15 | End: 2024-02-21 | Stop reason: HOSPADM

## 2024-02-15 RX ORDER — IPRATROPIUM BROMIDE AND ALBUTEROL SULFATE 2.5; .5 MG/3ML; MG/3ML
3 SOLUTION RESPIRATORY (INHALATION) EVERY 6 HOURS PRN
Status: DISCONTINUED | OUTPATIENT
Start: 2024-02-15 | End: 2024-02-15

## 2024-02-15 RX ORDER — SODIUM,POTASSIUM PHOSPHATES 280-250MG
2 POWDER IN PACKET (EA) ORAL
Status: DISCONTINUED | OUTPATIENT
Start: 2024-02-15 | End: 2024-02-21 | Stop reason: HOSPADM

## 2024-02-15 RX ORDER — LANOLIN ALCOHOL/MO/W.PET/CERES
800 CREAM (GRAM) TOPICAL
Status: DISCONTINUED | OUTPATIENT
Start: 2024-02-15 | End: 2024-02-21 | Stop reason: HOSPADM

## 2024-02-15 RX ORDER — SODIUM CHLORIDE 0.9 % (FLUSH) 0.9 %
10 SYRINGE (ML) INJECTION EVERY 12 HOURS PRN
Status: DISCONTINUED | OUTPATIENT
Start: 2024-02-15 | End: 2024-02-21 | Stop reason: HOSPADM

## 2024-02-15 RX ORDER — ENOXAPARIN SODIUM 100 MG/ML
30 INJECTION SUBCUTANEOUS EVERY 24 HOURS
Status: DISCONTINUED | OUTPATIENT
Start: 2024-02-15 | End: 2024-02-21 | Stop reason: HOSPADM

## 2024-02-15 RX ORDER — NALOXONE HCL 0.4 MG/ML
0.02 VIAL (ML) INJECTION
Status: DISCONTINUED | OUTPATIENT
Start: 2024-02-15 | End: 2024-02-21 | Stop reason: HOSPADM

## 2024-02-15 RX ORDER — ALUMINUM HYDROXIDE, MAGNESIUM HYDROXIDE, AND SIMETHICONE 1200; 120; 1200 MG/30ML; MG/30ML; MG/30ML
30 SUSPENSION ORAL 4 TIMES DAILY PRN
Status: DISCONTINUED | OUTPATIENT
Start: 2024-02-15 | End: 2024-02-21 | Stop reason: HOSPADM

## 2024-02-15 RX ORDER — ARFORMOTEROL TARTRATE 15 UG/2ML
15 SOLUTION RESPIRATORY (INHALATION) 2 TIMES DAILY
Status: DISCONTINUED | OUTPATIENT
Start: 2024-02-16 | End: 2024-02-21 | Stop reason: HOSPADM

## 2024-02-15 RX ORDER — IBUPROFEN 200 MG
200 TABLET ORAL EVERY 8 HOURS PRN
Status: DISCONTINUED | OUTPATIENT
Start: 2024-02-15 | End: 2024-02-15

## 2024-02-15 RX ORDER — ENOXAPARIN SODIUM 100 MG/ML
40 INJECTION SUBCUTANEOUS EVERY 24 HOURS
Status: DISCONTINUED | OUTPATIENT
Start: 2024-02-15 | End: 2024-02-15

## 2024-02-15 RX ORDER — NALOXONE HCL 0.4 MG/ML
0.4 VIAL (ML) INJECTION
Status: COMPLETED | OUTPATIENT
Start: 2024-02-15 | End: 2024-02-15

## 2024-02-15 RX ORDER — BUPROPION HYDROCHLORIDE 150 MG/1
300 TABLET ORAL DAILY
Status: DISCONTINUED | OUTPATIENT
Start: 2024-02-15 | End: 2024-02-21 | Stop reason: HOSPADM

## 2024-02-15 RX ORDER — ACETAMINOPHEN 500 MG
1000 TABLET ORAL EVERY 6 HOURS PRN
Status: DISCONTINUED | OUTPATIENT
Start: 2024-02-15 | End: 2024-02-17

## 2024-02-15 RX ORDER — SODIUM CHLORIDE AND POTASSIUM CHLORIDE 150; 900 MG/100ML; MG/100ML
INJECTION, SOLUTION INTRAVENOUS CONTINUOUS
Status: DISPENSED | OUTPATIENT
Start: 2024-02-15 | End: 2024-02-17

## 2024-02-15 RX ORDER — IBUPROFEN 200 MG
16 TABLET ORAL
Status: DISCONTINUED | OUTPATIENT
Start: 2024-02-15 | End: 2024-02-21 | Stop reason: HOSPADM

## 2024-02-15 RX ORDER — IBUPROFEN 200 MG
24 TABLET ORAL
Status: DISCONTINUED | OUTPATIENT
Start: 2024-02-15 | End: 2024-02-21 | Stop reason: HOSPADM

## 2024-02-15 RX ORDER — IPRATROPIUM BROMIDE AND ALBUTEROL SULFATE 2.5; .5 MG/3ML; MG/3ML
3 SOLUTION RESPIRATORY (INHALATION) EVERY 6 HOURS
Status: DISCONTINUED | OUTPATIENT
Start: 2024-02-16 | End: 2024-02-17

## 2024-02-15 RX ORDER — GLUCAGON 1 MG
1 KIT INJECTION
Status: DISCONTINUED | OUTPATIENT
Start: 2024-02-15 | End: 2024-02-21 | Stop reason: HOSPADM

## 2024-02-15 RX ORDER — ONDANSETRON HYDROCHLORIDE 2 MG/ML
4 INJECTION, SOLUTION INTRAVENOUS EVERY 6 HOURS PRN
Status: DISCONTINUED | OUTPATIENT
Start: 2024-02-15 | End: 2024-02-21 | Stop reason: HOSPADM

## 2024-02-15 RX ORDER — ACETAMINOPHEN 325 MG/1
650 TABLET ORAL EVERY 4 HOURS PRN
Status: DISCONTINUED | OUTPATIENT
Start: 2024-02-15 | End: 2024-02-15

## 2024-02-15 RX ORDER — HYDROCODONE BITARTRATE AND ACETAMINOPHEN 5; 325 MG/1; MG/1
1 TABLET ORAL EVERY 6 HOURS PRN
Status: DISCONTINUED | OUTPATIENT
Start: 2024-02-15 | End: 2024-02-15

## 2024-02-15 RX ORDER — ACETAMINOPHEN 500 MG
1000 TABLET ORAL EVERY 6 HOURS PRN
Status: DISCONTINUED | OUTPATIENT
Start: 2024-02-15 | End: 2024-02-15

## 2024-02-15 RX ORDER — BUDESONIDE 0.5 MG/2ML
0.25 INHALANT ORAL 2 TIMES DAILY
Status: DISCONTINUED | OUTPATIENT
Start: 2024-02-16 | End: 2024-02-21 | Stop reason: HOSPADM

## 2024-02-15 RX ORDER — AMLODIPINE BESYLATE 5 MG/1
5 TABLET ORAL DAILY
Status: DISCONTINUED | OUTPATIENT
Start: 2024-02-15 | End: 2024-02-15

## 2024-02-15 RX ORDER — LOSARTAN POTASSIUM 25 MG/1
100 TABLET ORAL DAILY
Status: DISCONTINUED | OUTPATIENT
Start: 2024-02-15 | End: 2024-02-15

## 2024-02-15 RX ADMIN — BUPROPION HYDROCHLORIDE 300 MG: 150 TABLET, EXTENDED RELEASE ORAL at 09:02

## 2024-02-15 RX ADMIN — SENNOSIDES AND DOCUSATE SODIUM 1 TABLET: 8.6; 5 TABLET ORAL at 09:02

## 2024-02-15 RX ADMIN — NALXONE HYDROCHLORIDE 0.4 MG: 0.4 INJECTION INTRAMUSCULAR; INTRAVENOUS; SUBCUTANEOUS at 01:02

## 2024-02-15 RX ADMIN — SODIUM CHLORIDE AND POTASSIUM CHLORIDE: .9; .15 SOLUTION INTRAVENOUS at 09:02

## 2024-02-15 RX ADMIN — POTASSIUM CHLORIDE 10 MEQ: 750 CAPSULE, EXTENDED RELEASE ORAL at 09:02

## 2024-02-15 RX ADMIN — SODIUM CHLORIDE AND POTASSIUM CHLORIDE: .9; .15 SOLUTION INTRAVENOUS at 02:02

## 2024-02-15 RX ADMIN — IPRATROPIUM BROMIDE AND ALBUTEROL SULFATE 3 ML: 2.5; .5 SOLUTION RESPIRATORY (INHALATION) at 08:02

## 2024-02-15 RX ADMIN — ENOXAPARIN SODIUM 30 MG: 30 INJECTION SUBCUTANEOUS at 05:02

## 2024-02-15 NOTE — PROGRESS NOTES
Pharmacist Renal Dose Adjustment Note    Marietta Gates is a 89 y.o. female being treated with Enoxaparin.    Patient Data:    Vital Signs (Most Recent):  Temp: 97.7 °F (36.5 °C) (02/14/24 2158)  Pulse: 70 (02/14/24 2357)  Resp: (!) 22 (02/14/24 2357)  BP: 138/65 (02/15/24 0000)  SpO2: 97 % (02/15/24 0000) Vital Signs (72h Range):  Temp:  [97.7 °F (36.5 °C)]   Pulse:  [38-70]   Resp:  [16-22]   BP: (130-149)/(58-65)   SpO2:  [91 %-97 %]      Recent Labs   Lab 02/15/24  0001   CREATININE 1.2     Serum creatinine: 1.2 mg/dL 02/15/24 0001  Estimated creatinine clearance: 23.6 mL/min    Enoxaparin 40 mg every 24 hours will be changed to Enoxaparin 30 mg every 24 hours due to CrCl < 30 per Renal Dose Adjustment protocol.    Pharmacist's Name: Tiffanie Anderson  Pharmacist's Extension: [phone]

## 2024-02-15 NOTE — H&P
[facility] - Emergency Dept  Hospital Medicine  History & Physical    Patient Name: Marietta Gates  MRN: [id number]  Patient Class: Emergency  Admission Date: 2/14/2024  Attending Physician: London Zhang MD  Primary Care Provider: Ollie Bartlett MD         Patient information was obtained from relative(s) and ER records.     Subjective:     Principal Problem:Closed fracture of left hip    Chief Complaint:   Chief Complaint   Patient presents with    Fall     Trip and fall, no LOC or blood thinners.     Hip Pain     C/o of left hip/groin/thigh pain, cannot bare weight on left leg        HPI: 89 WF with COPD hx not on oxygen , still smokes ,  has dementia ,  HTN .     Smoker  Lives at home with daughter  Gets around on her own with cane or walker     She was in usual state of health and tripped over a bucket at home and fell on her left side.    She was then having a lot of pain the daughter said and could not bear weight on the left leg.      So they brought her in to our ER.       Here she was found to have Acute fractures of the left superior and inferior pelvic rami     They spoke to orthopedics who said it was non operative and will need PT and conservative mgt    Plan was to admit her and control pain and get PT eval and then maybe placement     Daughter was in agreement with all of this     They did confirm that she is DNR       Patient was unable to give me any history     She got small morphine IV dose in ER and then became very unresponsive and bradycardic   So then she got some narcan doses   They thought she had some deviation of eyes so they sent her for second CT of her head.  The first CT head on arrival was normal.        Past Medical History:   Diagnosis Date    Cataract     COPD (chronic obstructive pulmonary disease)     GERD (gastroesophageal reflux disease)     Hyperlipidemia     Hypertension     Osteoporosis     Thyroid disease        Past Surgical History:    Procedure Laterality Date    BUNIONECTOMY      THYROIDECTOMY         Review of patient's allergies indicates:   Allergen Reactions    Indomethacin Rash       No current facility-administered medications on file prior to encounter.     Current Outpatient Medications on File Prior to Encounter   Medication Sig    albuterol (ACCUNEB) 1.25 mg/3 mL Nebu Take 1.25 mg by nebulization every 6 (six) hours as needed.    amLODIPine (NORVASC) 5 MG tablet Take 1 tablet (5 mg total) by mouth once daily.    ascorbic acid, vitamin C, (VITAMIN C) 500 MG tablet Take 500 mg by mouth once daily.    buPROPion (WELLBUTRIN XL) 300 MG 24 hr tablet Take 1 tablet (300 mg total) by mouth once daily.    calcium carbonate (OS-KATELYN) 500 mg calcium (1,250 mg) chewable tablet Take 2 tablets by mouth once daily. 300 mg gummy chewable    donepeziL (ARICEPT) 10 MG tablet TAKE 1 TABLET(10 MG) BY MOUTH EVERY EVENING (Patient not taking: Reported on 1/24/2024)    levocetirizine (XYZAL) 5 MG tablet Take 1 tablet (5 mg total) by mouth nightly as needed for Allergies.    losartan (COZAAR) 100 MG tablet Take 1 tablet (100 mg total) by mouth once daily.    lovastatin (MEVACOR) 20 MG tablet TAKE 1 TABLET(20 MG) BY MOUTH EVERY EVENING    multivitamin capsule Take 1 capsule by mouth once daily.    UNABLE TO FIND Take 1,053 mg by mouth once daily. Brain support    vit A/vit C/vit E/zinc/copper (OCUVITE PRESERVISION ORAL) Take 2 tablets by mouth once daily.    [DISCONTINUED] omeprazole (PRILOSEC) 20 MG capsule Take 1 capsule (20 mg total) by mouth once daily. (Patient not taking: Reported on 1/24/2024)    [DISCONTINUED] ondansetron (ZOFRAN-ODT) 4 MG TbDL Take 1 tablet (4 mg total) by mouth every 12 (twelve) hours as needed (N/V). (Patient not taking: Reported on 1/24/2024)     Family History    None       Tobacco Use    Smoking status: Every Day     Current packs/day: 0.25     Average packs/day: 0.3 packs/day for 76.0 years (19.0 ttl pk-yrs)     Types:  Cigarettes    Smokeless tobacco: Never    Tobacco comments:     Family gives patient 6 cigarettes per day   Substance and Sexual Activity    Alcohol use: Yes     Alcohol/week: 5.0 standard drinks of alcohol     Types: 5 Glasses of wine per week    Drug use: Never    Sexual activity: Not Currently     Partners: Male     Review of Systems   Constitutional:  Negative for activity change, appetite change, chills and fever.   HENT:  Negative for congestion, hearing loss and tinnitus.    Eyes:  Negative for pain, redness and visual disturbance.   Respiratory:  Negative for apnea, cough, chest tightness, shortness of breath and wheezing.    Cardiovascular:  Negative for chest pain, palpitations and leg swelling.   Gastrointestinal:  Negative for abdominal distention, abdominal pain, blood in stool, constipation, diarrhea, nausea and vomiting.   Genitourinary:  Negative for dysuria.   Musculoskeletal:  Negative for arthralgias, back pain, gait problem, joint swelling and myalgias.   Skin:  Negative for color change and rash.   Neurological:  Negative for dizziness, weakness, light-headedness and headaches.   Hematological:  Does not bruise/bleed easily.   Psychiatric/Behavioral:  Negative for confusion and sleep disturbance. The patient is not nervous/anxious.      Objective:     Vital Signs (Most Recent):  Temp: 97.7 °F (36.5 °C) (02/14/24 2158)  Pulse: 70 (02/14/24 2357)  Resp: (!) 22 (02/14/24 2357)  BP: 138/65 (02/15/24 0000)  SpO2: 97 % (02/15/24 0000) Vital Signs (24h Range):  Temp:  [97.7 °F (36.5 °C)] 97.7 °F (36.5 °C)  Pulse:  [38-70] 70  Resp:  [16-22] 22  SpO2:  [91 %-97 %] 97 %  BP: (130-149)/(58-65) 138/65     Weight: 49.9 kg (110 lb)  Body mass index is 18.88 kg/m².     Physical Exam  Constitutional:       Appearance: Normal appearance.   HENT:      Head: Normocephalic and atraumatic.      Nose: Nose normal.      Mouth/Throat:      Mouth: Mucous membranes are moist.      Pharynx: Oropharynx is clear.   Eyes:       Extraocular Movements: Extraocular movements intact.      Conjunctiva/sclera: Conjunctivae normal.      Pupils: Pupils are equal, round, and reactive to light.   Cardiovascular:      Rate and Rhythm: Normal rate and regular rhythm.      Pulses: Normal pulses.      Heart sounds: Normal heart sounds.   Pulmonary:      Effort: Pulmonary effort is normal.      Breath sounds: Normal breath sounds.   Abdominal:      General: Abdomen is flat. Bowel sounds are normal.      Palpations: Abdomen is soft.   Musculoskeletal:         General: Normal range of motion.      Cervical back: Normal range of motion and neck supple.   Skin:     General: Skin is warm and dry.      Capillary Refill: Capillary refill takes less than 2 seconds.   Neurological:      General: No focal deficit present.      Mental Status: She is alert.      Comments: Very somnolent    Psychiatric:      Comments: Somnolent but would wake up and look at me and follow some commands               CRANIAL NERVES     CN III, IV, VI   Pupils are equal, round, and reactive to light.       Significant Labs: All pertinent labs within the past 24 hours have been reviewed.  CBC:   Recent Labs   Lab 02/15/24  0001   WBC 13.42*   HGB 13.7   HCT 42.3        CMP:   Recent Labs   Lab 02/15/24  0001   *   K 3.4*   CL 98   CO2 28   GLU 95   BUN 20   CREATININE 1.2   CALCIUM 9.2   PROT 7.6   ALBUMIN 3.7   BILITOT 0.5   ALKPHOS 61   AST 16   ALT 14   ANIONGAP 9       Significant Imaging: I have reviewed all pertinent imaging results/findings within the past 24 hours.  I have reviewed and interpreted all pertinent imaging results/findings within the past 24 hours.  Assessment/Plan:     * Closed fracture of left hip    Acute fractures of the left superior and inferior pelvic rami     Non operative per ortho     PLAN    - control pain .    I would NOT Give more IV morphine    2 mg IV in ER knocked her out and sats dropped and HR dropped and was given narcan     Stick  to tylenol and Nsaids for now if kidneys are ok    - PT consult     - social work consult for possible rehab placement         DNR confirmed     Encephalopathy, toxic  From 2 mg IV morphine    Got IV narcan twice     Head CT repeated did not show anything new     I would avoid any opiates and benzo type drugs       Let her wake up     Run some IVFs         VTE Risk Mitigation (From admission, onward)      None                            London Zhang MD  Department of Hospital Medicine  [facility] Emergency Dept

## 2024-02-15 NOTE — ASSESSMENT & PLAN NOTE
From 2 mg IV morphine    Got IV narcan twice     Head CT repeated did not show anything new     I would avoid any opiates and benzo type drugs       Let her wake up     Run some IVFs

## 2024-02-15 NOTE — CONSULTS
[facility]  Orthopedics  Consult Note    Patient Name: Marietta Gates  MRN: [id number]  Admission Date: 2/14/2024  Hospital Length of Stay: 0 days  Attending Provider: London Zhang MD  Primary Care Provider: Ollie Bartlett MD    Patient information was obtained from patient, relative(s), and ER records.     Inpatient consult to Orthopedic Surgery  Consult performed by: David Posey MD  Consult ordered by: Mj Frank, BELINDA        Subjective:     Principal Problem:Closed fracture of left hip    Chief Complaint:   Chief Complaint   Patient presents with    Fall     Trip and fall, no LOC or blood thinners.     Hip Pain     C/o of left hip/groin/thigh pain, cannot bare weight on left leg        HPI:  Pleasant 89-year-old female with COPD who presented to the emergency department yesterday with a chief complaint of left hip pain after a fall.  She was going outside to smoke a cigarette when she sustained a fall onto her left hip.  She previously ambulated with a cane.  She lives with her daughter.  Previous community ambulator.  She reports left hip and lateral hip pain with ambulation.  Having severe pain with weight-bearing.  Had An issue with narcotics while in the emergency department and required Narcan due to diaphoresis.    Past Medical History:   Diagnosis Date    Cataract     COPD (chronic obstructive pulmonary disease)     GERD (gastroesophageal reflux disease)     Hyperlipidemia     Hypertension     Osteoporosis     Thyroid disease        Past Surgical History:   Procedure Laterality Date    BUNIONECTOMY      THYROIDECTOMY         Review of patient's allergies indicates:   Allergen Reactions    Indomethacin Rash       Current Facility-Administered Medications   Medication    0.9 % NaCl with KCl 20 mEq infusion    acetaminophen tablet 1,000 mg    albuterol-ipratropium 2.5 mg-0.5 mg/3 mL nebulizer solution 3 mL    aluminum-magnesium hydroxide-simethicone 200-200-20 mg/5 mL  suspension 30 mL    buPROPion TB24 tablet 300 mg    dextrose 50% injection 12.5 g    dextrose 50% injection 25 g    enoxaparin injection 30 mg    glucagon (human recombinant) injection 1 mg    glucose chewable tablet 16 g    glucose chewable tablet 24 g    magnesium oxide tablet 800 mg    magnesium oxide tablet 800 mg    naloxone 0.4 mg/mL injection 0.02 mg    ondansetron injection 4 mg    potassium bicarbonate disintegrating tablet 35 mEq    potassium bicarbonate disintegrating tablet 50 mEq    potassium bicarbonate disintegrating tablet 60 mEq    potassium chloride CR capsule 10 mEq    potassium, sodium phosphates 280-160-250 mg packet 2 packet    potassium, sodium phosphates 280-160-250 mg packet 2 packet    potassium, sodium phosphates 280-160-250 mg packet 2 packet    senna-docusate 8.6-50 mg per tablet 1 tablet    sodium chloride 0.9% flush 10 mL     Family History    None       Tobacco Use    Smoking status: Every Day     Current packs/day: 0.25     Average packs/day: 0.3 packs/day for 76.0 years (19.0 ttl pk-yrs)     Types: Cigarettes    Smokeless tobacco: Never    Tobacco comments:     Family gives patient 6 cigarettes per day   Substance and Sexual Activity    Alcohol use: Yes     Alcohol/week: 5.0 standard drinks of alcohol     Types: 5 Glasses of wine per week    Drug use: Never    Sexual activity: Not Currently     Partners: Male     ROS    Review of Systems:  Constitutional: no fever or chills  Eyes: no visual changes  ENT: no nasal congestion or sore throat  Respiratory: no cough or shortness of breath  Cardiovascular: no chest pain  Gastrointestinal: no nausea or vomiting, tolerating diet  Musculoskeletal: pain and soreness  All others negative   Objective:     Vital Signs (Most Recent):  Temp: 97.7 °F (36.5 °C) (02/14/24 2158)  Pulse: 67 (02/15/24 1300)  Resp: 20 (02/15/24 1300)  BP: (!) 159/70 (02/15/24 1300)  SpO2: 98 % (02/15/24 1300) Vital Signs (24h Range):  Temp:  [97.7 °F (36.5 °C)] 97.7 °F  "(36.5 °C)  Pulse:  [38-77] 67  Resp:  [16-25] 20  SpO2:  [82 %-99 %] 98 %  BP: (117-160)/(56-70) 159/70     Weight: 54.4 kg (120 lb)  Height: 5' 3" (160 cm)  Body mass index is 21.26 kg/m².    No intake or output data in the 24 hours ending 02/15/24 1600    Ortho/SPM Exam    On exam she is alert and oriented.  She is on nasal face mask.  She has pain and tenderness along the left hemipelvis and hip.  Negative log roll.  Mild pain over the trochanteric bursa and iliac crest.  Neurovascular intact.  Warm well-perfused extremity.  Palpable DP and PT    Significant Labs:  Reviewed.    Significant Imaging: CT: I have reviewed all pertinent results/findings and my personal findings are:  Nondisplaced left superior and inferior pubic rami fracture    Assessment/Plan:     Active Diagnoses:    Diagnosis Date Noted POA    PRINCIPAL PROBLEM:  Closed fracture of left hip [S72.002A] 02/15/2024 Yes    Encephalopathy, toxic [G92.9] 02/15/2024 No    Hypokalemia [E87.6] 02/15/2024 Yes      Problems Resolved During this Admission:     Treatment options were discussed in detail with the patient and patient's family.  She has nondisplaced left inferior and superior pubic rami fracture status post fall.  Patient was a previous community ambulator.  Went over the CT scan which shows a stable fracture pattern.  She can weight bear as tolerated with physical therapy.  Patient has done well in the past with Tylenol for analgesia.  No surgical intervention warranted.  Recommend aggressive PT/OT as well as pain control.    Thank you for your consult. I will sign off. Please contact us if you have any additional questions.    David Posey MD  Orthopedics  [facility]        "

## 2024-02-15 NOTE — PT/OT/SLP EVAL
Occupational Therapy   Evaluation    Name: Marietta Gates  MRN: [id number]  Admitting Diagnosis: Closed fracture of left hip  Recent Surgery: * No surgery found *      Recommendations:     Discharge Recommendations: Moderate Intensity Therapy  Discharge Equipment Recommendations:  bedside commode, walker, rolling  Barriers to discharge:  Decreased caregiver support    Assessment:     Marietta Gates is a 89 y.o. female with a medical diagnosis of Closed fracture of left hip.  Performance deficits affecting function: weakness, impaired endurance, impaired self care skills, impaired functional mobility, gait instability, impaired balance, pain, impaired cognition, decreased safety awareness, orthopedic precautions.      Rehab Prognosis: Fair; patient would benefit from acute skilled OT services to address these deficits and reach maximum level of function.       Plan:     Patient to be seen 5 x/week to address the above listed problems via self-care/home management, therapeutic activities, therapeutic exercises  Plan of Care Expires: 03/16/24  Plan of Care Reviewed with: patient, son    Subjective       Occupational Profile:  Living Environment: Lives with son and DIL  Previous level of function: Mod (I) household mobility with cane; rollator in community; DIL assists with bathing/dressing  Equipment Used at Home: rollator, cane, straight  Assistance upon Discharge: family    Pain/Comfort:  Pain Rating 1:  (not rated)  Pain Rating Post-Intervention 1:  (not rated)    Objective:     Communicated with: nurse prior to session.  Patient found supine with oxygen, telemetry upon OT entry to room.    General Precautions: Standard, fall  Orthopedic Precautions: RLE weight bearing as tolerated, LLE weight bearing as tolerated  Braces: N/A    Occupational Performance:    Activities of Daily Living:  Grooming: stand by assistance face washing HOB elevated  Toileting: total A via pure wick/brief  Lower Body Dressing:  total assistance bed level     Cognitive/Visual Perceptual:  Cognitive/Psychosocial Skills:     -       Follows Commands/attention:Follows two-step commands    Physical Exam:  Upper Extremity Range of Motion:     -       Right Upper Extremity: WFL  -       Left Upper Extremity: WFL  Upper Extremity Strength:    -       Right Upper Extremity: WFL  -       Left Upper Extremity: WFL   Strength:    -       Right Upper Extremity: WFL  -       Left Upper Extremity: WFL  Fine Motor Coordination:    -       Intact  Gross motor coordination:   WFL    AMPAC 6 Click ADL:  AMPAC Total Score: 17    Treatment & Education:  Pt/family educated on OT role/POC    Patient left HOB elevated with all lines intact, call button in reach, bed alarm on, and family present    GOALS:   Multidisciplinary Problems       Occupational Therapy Goals          Problem: Occupational Therapy    Goal Priority Disciplines Outcome Interventions   Occupational Therapy Goal     OT, PT/OT     Description: Goals to be met by: 3/15/24     Patient will increase functional independence with ADLs by performing:    UE Dressing with Minimal Assistance.  LE Dressing with Moderate Assistance.  Grooming while seated with Stand-by Assistance.  Toileting from toilet with Minimal Assistance for hygiene and clothing management.   Toilet transfer to toilet with Minimal Assistance.                         History:     Past Medical History:   Diagnosis Date    Cataract     COPD (chronic obstructive pulmonary disease)     GERD (gastroesophageal reflux disease)     Hyperlipidemia     Hypertension     Osteoporosis     Thyroid disease          Past Surgical History:   Procedure Laterality Date    BUNIONECTOMY      THYROIDECTOMY         Time Tracking:     OT Date of Treatment: 02/15/24  OT Start Time: 1518  OT Stop Time: 1530  OT Total Time (min): 12 min    Billable Minutes:Evaluation 12    2/15/2024

## 2024-02-15 NOTE — ASSESSMENT & PLAN NOTE
Acute fractures of the left superior and inferior pelvic rami     Non operative per ortho     PLAN    - control pain .    I would NOT Give more IV morphine    2 mg IV in ER knocked her out and sats dropped and HR dropped and was given narcan     Stick to tylenol and Nsaids for now if kidneys are ok    - PT consult     - social work consult for possible rehab placement         DNR confirmed

## 2024-02-15 NOTE — PT/OT/SLP EVAL
Physical Therapy Evaluation    Patient Name:  Marietta Gates   MRN:  [id number]    Recommendations:     Discharge Recommendations:   Moderate intensity therapy  Discharge Equipment Recommendations: to be determined by next level of care   Barriers to discharge:  Increased caregiver burden of care. Significant decline in level of independence for functional mobility, inability to t/f to stand    Assessment:     Marietta Gates is a 89 y.o. female admitted with a medical diagnosis of Closed fracture of left hip.  She presents with the following impairments/functional limitations: impaired self care skills, impaired functional mobility, gait instability, impaired balance, impaired cognition, decreased lower extremity function, decreased safety awareness, pain, impaired cardiopulmonary response to activity, orthopedic precautions . PT eval was limited to t/f supine<>sit  with Max Ax 2   with pt tolerating fairly with increased pain with change of positions. . She  reported increased comfort with return to bed with pillow placed  under left hip with pt slightly leaning to the right.      Rehab Prognosis: Fair; patient would benefit from acute skilled PT services to address these deficits and reach maximum level of function.    Recent Surgery: * No surgery found *      Plan:     During this hospitalization, patient to be seen 6 x/week to address the identified rehab impairments via gait training, therapeutic activities, therapeutic exercises and progress toward the following goals:    Plan of Care Expires:  03/15/24    Subjective     Chief Complaint: L sacral pain with change of position  Patient/Family Comments/goals: Did not state  Pain/Comfort:  Pain Rating 1:  (Did not rate)  Location - Side 1: Left  Location 1: sacral spine  Pain Addressed 1: Reposition, Pre-medicate for activity, Cessation of Activity    Patients cultural, spiritual, Hindu conflicts given the current situation:      Living  Environment:  Pt lives at home with family .  Prior to admission, patients level of function was ambulatory with RW/cane  with Mod I . Pt reported independence with bathing and dressing ..  Equipment used at home: walker, rolling and cane  DME owned (not currently used): none.  Upon discharge, patient will have assistance from her family.    Objective:     Communicated with nurse  prior to session.  Patient found supine with bed alarm, blood pressure cuff, telemetry, PureWick  upon PT entry to room.    General Precautions: Standard, fall  Orthopedic Precautions: (Non indicated in agustin.To be clarified. Pt did not t/f to stand during PT eval)   Braces:    Respiratory Status: Nasal cannula, flow 3 L/min   Pt had removed 02 and her sa02 was 79%.  Increased to 95% with 02 in place    Exams:  Cognitive Exam:  Patient is oriented to Person, Place, and Situation  RLE ROM: WFL  RLE Strength: WFL  LLE ROM: Deficits: decreased hip ROM due to pain  LLE Strength: WFL    Functional Mobility:  Bed Mobility:     Supine to Sit: maximal assistance and of 2 persons  Sit to Supine: maximal assistance and of 2 persons  Balance: Min A for sitting EOB  with lean to the right      AM-PAC 6 CLICK MOBILITY  Total Score:10       Treatment & Education:  Pt was educated on safety, the use of call light, PT DC  recommendations    Patient left right sidelying with all lines intact, call button in reach, bed alarm on, and nurse  notified.    GOALS:   Multidisciplinary Problems       Physical Therapy Goals          Problem: Physical Therapy    Goal Priority Disciplines Outcome Goal Variances Interventions   Physical Therapy Goal     PT, PT/OT      Description: Goals to be met by: 3/15/2024     Patient will increase functional independence with mobility by performin. Supine to sit with Moderate Assistance  2. Sit to supine with Moderate Assistance  3. Bed to chair transfer with Moderate Assistance using No Assistive Device  4. Gait  x 10   feet with Minimal Assistance using Rolling Walker.                          History:     Past Medical History:   Diagnosis Date    Cataract     COPD (chronic obstructive pulmonary disease)     GERD (gastroesophageal reflux disease)     Hyperlipidemia     Hypertension     Osteoporosis     Thyroid disease        Past Surgical History:   Procedure Laterality Date    BUNIONECTOMY      THYROIDECTOMY         Time Tracking:     PT Received On: 02/15/24  PT Start Time: 1010     PT Stop Time: 1028  PT Total Time (min): 18 min     Billable Minutes: Evaluation 9 minutes  and Therapeutic Activity 9 minutes      02/15/2024

## 2024-02-15 NOTE — SUBJECTIVE & OBJECTIVE
Past Medical History:   Diagnosis Date    Cataract     COPD (chronic obstructive pulmonary disease)     GERD (gastroesophageal reflux disease)     Hyperlipidemia     Hypertension     Osteoporosis     Thyroid disease        Past Surgical History:   Procedure Laterality Date    BUNIONECTOMY      THYROIDECTOMY         Review of patient's allergies indicates:   Allergen Reactions    Indomethacin Rash       No current facility-administered medications on file prior to encounter.     Current Outpatient Medications on File Prior to Encounter   Medication Sig    albuterol (ACCUNEB) 1.25 mg/3 mL Nebu Take 1.25 mg by nebulization every 6 (six) hours as needed.    amLODIPine (NORVASC) 5 MG tablet Take 1 tablet (5 mg total) by mouth once daily.    ascorbic acid, vitamin C, (VITAMIN C) 500 MG tablet Take 500 mg by mouth once daily.    buPROPion (WELLBUTRIN XL) 300 MG 24 hr tablet Take 1 tablet (300 mg total) by mouth once daily.    calcium carbonate (OS-KATELYN) 500 mg calcium (1,250 mg) chewable tablet Take 2 tablets by mouth once daily. 300 mg gummy chewable    donepeziL (ARICEPT) 10 MG tablet TAKE 1 TABLET(10 MG) BY MOUTH EVERY EVENING (Patient not taking: Reported on 1/24/2024)    levocetirizine (XYZAL) 5 MG tablet Take 1 tablet (5 mg total) by mouth nightly as needed for Allergies.    losartan (COZAAR) 100 MG tablet Take 1 tablet (100 mg total) by mouth once daily.    lovastatin (MEVACOR) 20 MG tablet TAKE 1 TABLET(20 MG) BY MOUTH EVERY EVENING    multivitamin capsule Take 1 capsule by mouth once daily.    UNABLE TO FIND Take 1,053 mg by mouth once daily. Brain support    vit A/vit C/vit E/zinc/copper (OCUVITE PRESERVISION ORAL) Take 2 tablets by mouth once daily.    [DISCONTINUED] omeprazole (PRILOSEC) 20 MG capsule Take 1 capsule (20 mg total) by mouth once daily. (Patient not taking: Reported on 1/24/2024)    [DISCONTINUED] ondansetron (ZOFRAN-ODT) 4 MG TbDL Take 1 tablet (4 mg total) by mouth every 12 (twelve) hours as  needed (N/V). (Patient not taking: Reported on 1/24/2024)     Family History    None       Tobacco Use    Smoking status: Every Day     Current packs/day: 0.25     Average packs/day: 0.3 packs/day for 76.0 years (19.0 ttl pk-yrs)     Types: Cigarettes    Smokeless tobacco: Never    Tobacco comments:     Family gives patient 6 cigarettes per day   Substance and Sexual Activity    Alcohol use: Yes     Alcohol/week: 5.0 standard drinks of alcohol     Types: 5 Glasses of wine per week    Drug use: Never    Sexual activity: Not Currently     Partners: Male     Review of Systems   Constitutional:  Negative for activity change, appetite change, chills and fever.   HENT:  Negative for congestion, hearing loss and tinnitus.    Eyes:  Negative for pain, redness and visual disturbance.   Respiratory:  Negative for apnea, cough, chest tightness, shortness of breath and wheezing.    Cardiovascular:  Negative for chest pain, palpitations and leg swelling.   Gastrointestinal:  Negative for abdominal distention, abdominal pain, blood in stool, constipation, diarrhea, nausea and vomiting.   Genitourinary:  Negative for dysuria.   Musculoskeletal:  Negative for arthralgias, back pain, gait problem, joint swelling and myalgias.   Skin:  Negative for color change and rash.   Neurological:  Negative for dizziness, weakness, light-headedness and headaches.   Hematological:  Does not bruise/bleed easily.   Psychiatric/Behavioral:  Negative for confusion and sleep disturbance. The patient is not nervous/anxious.      Objective:     Vital Signs (Most Recent):  Temp: 97.7 °F (36.5 °C) (02/14/24 2158)  Pulse: 70 (02/14/24 2357)  Resp: (!) 22 (02/14/24 2357)  BP: 138/65 (02/15/24 0000)  SpO2: 97 % (02/15/24 0000) Vital Signs (24h Range):  Temp:  [97.7 °F (36.5 °C)] 97.7 °F (36.5 °C)  Pulse:  [38-70] 70  Resp:  [16-22] 22  SpO2:  [91 %-97 %] 97 %  BP: (130-149)/(58-65) 138/65     Weight: 49.9 kg (110 lb)  Body mass index is 18.88 kg/m².      Physical Exam  Constitutional:       Appearance: Normal appearance.   HENT:      Head: Normocephalic and atraumatic.      Nose: Nose normal.      Mouth/Throat:      Mouth: Mucous membranes are moist.      Pharynx: Oropharynx is clear.   Eyes:      Extraocular Movements: Extraocular movements intact.      Conjunctiva/sclera: Conjunctivae normal.      Pupils: Pupils are equal, round, and reactive to light.   Cardiovascular:      Rate and Rhythm: Normal rate and regular rhythm.      Pulses: Normal pulses.      Heart sounds: Normal heart sounds.   Pulmonary:      Effort: Pulmonary effort is normal.      Breath sounds: Normal breath sounds.   Abdominal:      General: Abdomen is flat. Bowel sounds are normal.      Palpations: Abdomen is soft.   Musculoskeletal:         General: Normal range of motion.      Cervical back: Normal range of motion and neck supple.   Skin:     General: Skin is warm and dry.      Capillary Refill: Capillary refill takes less than 2 seconds.   Neurological:      General: No focal deficit present.      Mental Status: She is alert.      Comments: Very somnolent    Psychiatric:      Comments: Somnolent but would wake up and look at me and follow some commands               CRANIAL NERVES     CN III, IV, VI   Pupils are equal, round, and reactive to light.       Significant Labs: All pertinent labs within the past 24 hours have been reviewed.  CBC:   Recent Labs   Lab 02/15/24  0001   WBC 13.42*   HGB 13.7   HCT 42.3        CMP:   Recent Labs   Lab 02/15/24  0001   *   K 3.4*   CL 98   CO2 28   GLU 95   BUN 20   CREATININE 1.2   CALCIUM 9.2   PROT 7.6   ALBUMIN 3.7   BILITOT 0.5   ALKPHOS 61   AST 16   ALT 14   ANIONGAP 9       Significant Imaging: I have reviewed all pertinent imaging results/findings within the past 24 hours.  I have reviewed and interpreted all pertinent imaging results/findings within the past 24 hours.

## 2024-02-15 NOTE — ED PROVIDER NOTES
Encounter Date: 2/14/2024       History     Chief Complaint   Patient presents with    Fall     Trip and fall, no LOC or blood thinners.     Hip Pain     C/o of left hip/groin/thigh pain, cannot bare weight on left leg     89-year-old female presents emergency room for evaluation mechanical ground level fall.  Patient tells me that she lost her balance, fell landing on her left hip.  Did not hit her head or lose consciousness.  Is not anticoagulated.  Complains of left hip pain, primarily with internal and external rotation of the hip.  No distal paresthesias or loss of motor function.  Tells me she can not bear weight.    The history is provided by the patient. No  was used.     Review of patient's allergies indicates:   Allergen Reactions    Indomethacin Rash     Past Medical History:   Diagnosis Date    Cataract     COPD (chronic obstructive pulmonary disease)     GERD (gastroesophageal reflux disease)     Hyperlipidemia     Hypertension     Osteoporosis     Thyroid disease      Past Surgical History:   Procedure Laterality Date    BUNIONECTOMY      THYROIDECTOMY       History reviewed. No pertinent family history.  Social History     Tobacco Use    Smoking status: Every Day     Current packs/day: 0.25     Average packs/day: 0.3 packs/day for 76.0 years (19.0 ttl pk-yrs)     Types: Cigarettes    Smokeless tobacco: Never    Tobacco comments:     Family gives patient 6 cigarettes per day   Substance Use Topics    Alcohol use: Yes     Alcohol/week: 5.0 standard drinks of alcohol     Types: 5 Glasses of wine per week    Drug use: Never     Review of Systems   Constitutional:  Negative for chills, fatigue and fever.   HENT:  Negative for congestion, hearing loss, sore throat and trouble swallowing.    Eyes:  Negative for visual disturbance.   Respiratory:  Negative for cough, chest tightness and shortness of breath.    Cardiovascular:  Negative for chest pain.   Gastrointestinal:  Negative for  abdominal pain and nausea.   Endocrine: Negative for polyuria.   Genitourinary:  Negative for difficulty urinating.   Musculoskeletal:  Negative for arthralgias and myalgias.   Skin:  Negative for rash.   Neurological:  Negative for dizziness and headaches.   Psychiatric/Behavioral:  The patient is not nervous/anxious.        Physical Exam     Initial Vitals [02/14/24 2158]   BP Pulse Resp Temp SpO2   (!) 143/65 70 16 97.7 °F (36.5 °C) (!) 94 %      MAP       --         Physical Exam    Nursing note and vitals reviewed.  Constitutional: She appears well-developed and well-nourished.   HENT:   Head: Normocephalic and atraumatic.   Eyes: Conjunctivae are normal. Pupils are equal, round, and reactive to light.   Neck: Neck supple.   Normal range of motion.  Cardiovascular:  Normal rate, regular rhythm and normal heart sounds.           Pulmonary/Chest: Breath sounds normal.   Musculoskeletal:      Cervical back: Normal range of motion and neck supple.      Comments: Exam significant for:  Neurovascularly intact distally.  TTP minimally about the left hip and pelvis.  Pain reproduced with internal and external rotation.       Neurological: She is alert and oriented to person, place, and time. GCS score is 15. GCS eye subscore is 4. GCS verbal subscore is 5. GCS motor subscore is 6.   Skin: Skin is warm and dry. Capillary refill takes less than 2 seconds.   Psychiatric: She has a normal mood and affect. Her behavior is normal. Judgment and thought content normal.         ED Course   Procedures  Labs Reviewed   POCT GLUCOSE - Abnormal; Notable for the following components:       Result Value    POC Glucose 120 (*)     All other components within normal limits   CBC W/ AUTO DIFFERENTIAL   COMPREHENSIVE METABOLIC PANEL   MAGNESIUM     EKG Readings: (Independently Interpreted)   Initial Reading: No STEMI. Rhythm: Normal Sinus Rhythm. Heart Rate: 72. Ectopy: No Ectopy. Conduction: 1st Degree AV Block.       Imaging Results               X-Ray Chest AP Portable (In process)                      CT Pelvis Without Contrast (Final result)  Result time 02/14/24 23:02:50      Final result by Herve Fabian MD (02/14/24 23:02:50)                   Narrative:    EXAM DESCRIPTION:  CT PELVIS WITHOUT CONTRAST  RadLex: CT PELVIS WITHOUT IV CONTRAST    CLINICAL HISTORY:  89 years  Female;  Pelvic trauma    TECHNOLOGIST NOTES:    Comparison: None currently available    Technique:    CT of the pelvis was done without intravenous contrast, followed by orthogonal reconstructions.  This exam was performed according to our departmental dose-optimization program, which includes automated exposure control, adjustment of the mA and/or kV according to patient size and/or use of iterative reconstruction technique.    Findings:  Acute fractures of the left superior and inferior pelvic rami there is a small associated left pelvic soft tissue hematoma. There is no dislocation.   Femoral head contour is maintained. There is no evidence of avascular necrosis. No focal lytic or blastic abnormality.    The visualized pelvic contents are grossly unremarkable. No evidence of an inflammatory process. There are no herniated bowel loops. Bladder appears grossly unremarkable. No evidence of significant free fluid in the pelvis. Uterus present. Extensive degenerative spondylosis in the visualized lower lumbar spine.    Impression:    1.  Acute fractures of the left superior and inferior pelvic rami    Electronically signed by:  Herve Fabian MD  02/14/2024 11:02 PM CST Workstation: [id number]                                     CT Cervical Spine Without Contrast (Final result)  Result time 02/14/24 22:49:43      Final result by Herve Fabian MD (02/14/24 22:49:43)                   Narrative:    CT HEAD WITHOUT CONTRAST (accession [id number]), CT CERVICAL SPINE WITHOUT CONTRAST (accession [id number])  RadLex: CT HEAD WITHOUT IV CONTRAST, CT CERVICAL SPINE WITHOUT IV  CONTRAST    CLINICAL HISTORY:  89 years  Female;  Head trauma, minor (Age >= 65y)    TECHNOLOGIST NOTES:    TECHNIQUE:    Helical CT imaging performed without contrast. Axial, sagittal and coronal reformatted images are available for review.  This exam was performed according to our departmental dose-optimization program, which includes automated exposure control, adjustment of the mA and/or kV according to patient size and/or use of iterative reconstruction technique.    COMPARISON: None currently available      FINDINGS:    Head:    No acute intracranial hemorrhage. No mass effect, midline shift or hydrocephalus. The gray-white matter differentiation is grossly unremarkable. No evidence of acute large territory infarct.   Within the broad range of normal, brain volume is age appropriate.    The visualized paranasal sinuses are grossly unremarkable. The mastoid air cells are clear.    Cervical Spine:    There is no acute fracture or compression deformity. No traumatic malalignment. The paravertebral soft tissues are grossly unremarkable.  There is severe emphysema in the lung apices bilaterally.      IMPRESSION:  1.  Head: No evidence of acute traumatic intracranial injury  2.  Cervical spine: No acute fracture or traumatic malalignment of the cervical spine  3.  Please see body of report for non-critical findings  4.  If symptoms persist or further imaging is clinically indicated, consider repeat imaging or follow-up MR imaging    Electronically signed by:  Herve Fabian MD  02/14/2024 10:49 PM [facility] Workstation: [id number]                                     CT Head Without Contrast (Final result)  Result time 02/14/24 22:49:43      Final result by Herve Fabian MD (02/14/24 22:49:43)                   Narrative:    CT HEAD WITHOUT CONTRAST (accession [id number]), CT CERVICAL SPINE WITHOUT CONTRAST (accession [id number])  RadLex: CT HEAD WITHOUT IV CONTRAST, CT CERVICAL SPINE WITHOUT IV CONTRAST    CLINICAL  HISTORY:  89 years  Female;  Head trauma, minor (Age >= 65y)    TECHNOLOGIST NOTES:    TECHNIQUE:    Helical CT imaging performed without contrast. Axial, sagittal and coronal reformatted images are available for review.  This exam was performed according to our departmental dose-optimization program, which includes automated exposure control, adjustment of the mA and/or kV according to patient size and/or use of iterative reconstruction technique.    COMPARISON: None currently available      FINDINGS:    Head:    No acute intracranial hemorrhage. No mass effect, midline shift or hydrocephalus. The gray-white matter differentiation is grossly unremarkable. No evidence of acute large territory infarct.   Within the broad range of normal, brain volume is age appropriate.    The visualized paranasal sinuses are grossly unremarkable. The mastoid air cells are clear.    Cervical Spine:    There is no acute fracture or compression deformity. No traumatic malalignment. The paravertebral soft tissues are grossly unremarkable.  There is severe emphysema in the lung apices bilaterally.      IMPRESSION:  1.  Head: No evidence of acute traumatic intracranial injury  2.  Cervical spine: No acute fracture or traumatic malalignment of the cervical spine  3.  Please see body of report for non-critical findings  4.  If symptoms persist or further imaging is clinically indicated, consider repeat imaging or follow-up MR imaging    Electronically signed by:  Herve Fabian MD  02/14/2024 10:49 PM CST Workstation: [id number]                                     Medications   morphine injection 2 mg (2 mg Intravenous Given 2/14/24 2245)   ondansetron injection 4 mg (4 mg Intravenous Given 2/14/24 2245)   naloxone 0.4 mg/mL injection 0.1 mg (0.1 mg Intravenous Given 2/14/24 5590)   naloxone 0.4 mg/mL injection 0.1 mg (0.1 mg Intravenous Given 2/14/24 8602)     Medical Decision Making  Patient presents for emergent evaluation of hip pain,  fall. Workup today consistent with likely acute inferior and superior pubic rami fracture of the left hip.  Differential includes fracture, dislocation, soft tissue injury.  Patient is nontoxic and well appearing, Afebrile with stable vital signs.  Imaging was ordered and documented in the ED course.  CT head and C-spine unremarkable.  CT pelvis notable for inferior and superior pubic rami fracture of the left hip.  I discussed patient case with consultant Dr. Posey, Orthopedics.  He agrees to follow during inpatient admission.  Discussed patient case with Dr. Zhang, [facility] medicine.  They agree to evaluate for admission.    The treatment they received in the emergency department included morphine, Zofran.    Amount and/or Complexity of Data Reviewed  Labs: ordered.  Radiology: ordered.    Risk  Prescription drug management.  Decision regarding hospitalization.              Attending Attestation:         Attending Critical Care:   Critical Care Times:   Direct Patient Care (initial evaluation, reassessments, and time considering the case)................................................................15 minutes.   Additional History from reviewing old medical records or taking additional history from the family, EMS, PCP, etc.......................5 minutes.   Ordering, Reviewing, and Interpreting Diagnostic Studies...............................................................................................................5 minutes.   Documentation..................................................................................................................................................................................10 minutes.   Consultation with other Physicians. .................................................................................................................................................5 minutes.   Consultation with the patient's family directly relating to the patient's condition,  care, and DNR status (when patient unable)......10 minutes.   ==============================================================  Total Critical Care Time - exclusive of procedural time: 50 minutes.  ==============================================================  Critical care was necessary to treat or prevent imminent or life-threatening deterioration of the following conditions: overdose and trauma.   The following critical care procedures were done by me (see procedure notes): airway management.   Critical care was time spent personally by me on the following activities: obtaining history from patient or relative, examination of patient, review of old charts, ordering lab, x-rays, and/or EKG, development of treatment plan with patient or relative, ordering and performing treatments and interventions, evaluation of patient's response to treatment, discussion with consultants, re-evaluation of patient's conition and interpretation of cardiac measurements.   Critical Care Condition: life-threatening           ED Course as of 02/15/24 0011   Thu Feb 15, 2024   0001 Called to bedside by primary nursing staff.  Patient came unresponsive, bradycardic to the 30s, hypoxic to 87%.  On evaluation had difficulty arousing patient, noted pinpoint pupils.  Was placed on 4 L OxyMask with improvement in saturation.  Decision made to give Narcan 0.1 mg.  This was repeated for a total of 2 doses.  Patient this time is more arousable, talkative with improving pulse ox.  Will wean off oxygen as tolerated.  Will repeat CT head as patient does have favored rightward deviation.  She is still able to cross midline and looks straight ahead.  She has reactive pupils.  No weakness to the extremities on my exam, no facial droop. [AN]      ED Course User Index  [AN] Mj Frank, BELINDA                     Attending Note:  I provided a face to face evaluation of this patient.  I discussed the patient's care with Advanced Practice Clinician.  I  reviewed their note and agree with the history, physical, assessment, diagnosis, treatment, all procedures performed, xray and EKG interpretations and discharge plan provided by the Advanced Practice Clinician. My overall impression is left superior and inferior pubic rami fracture, fall, advanced dementia, opiate overdose with somnolence.  Patient was treated with 2 mg of morphine here for pain due to her fall after having been found to have a pelvic fracture she was 89 years old and weighs 49.9 kg.  Though opiate pain medication resolved her pain patient became very sleepy and became hypoxic at 87% was placed on 4 L by face mask.  Family, Ms. King who is 1 of our CT techs also has noticed that she was now deviated her eyes to the right but can look forward and cross midline.  She reports this is new.  Head CT had been negative but will repeat head CT.  She was equal strength in upper and lower extremities she will awake and respond to sternal rub or noxious stimuli but quickly falls back asleep she has been having episodes of bradycardia into the 30s and 40s she has been given 0.1 mg of Narcan x2 and is more awake now though she does become sleepy again.  Glucose was 120 EKG showed sinus rhythm with sinus arrhythmia and first-degree AV block no ischemic changes.  Hospital Medicine will admit due to her being of advanced age with opiate overdose and needing skilled nursing placement  Crista Tavares M.D. 2/14/2024 11:45 PM        Clinical Impression:  Final diagnoses:  [S32.592A] Closed fracture of ramus of left pubis, initial encounter (Primary)  [T40.604A] Opiate overdose, iatrogenic, initial encounter          ED Disposition Condition    Admit Stable                Crista Tavares MD  02/15/24 0011

## 2024-02-15 NOTE — PHARMACY MED REC
"Admission Medication History     The home medication history was taken by Osmany Weiner.    You may go to "Admission" then "Reconcile Home Medications" tabs to review and/or act upon these items.     The home medication list has been updated by the Pharmacy department.   Please read ALL comments highlighted in yellow.   Please address this information as you see fit.    Feel free to contact us if you have any questions or require assistance.      The medications listed below were removed from the home medication list. Please reorder if appropriate:  Patient reports no longer taking the following medication(s):  Albuterol 1.25 mg / 3 ml    Medications listed below were obtained from: Patient/family and Analytic software- [employer]  No current facility-administered medications on file prior to encounter.     Current Outpatient Medications on File Prior to Encounter   Medication Sig Dispense Refill    amLODIPine (NORVASC) 5 MG tablet Take 1 tablet (5 mg total) by mouth once daily. 90 tablet 3    ascorbic acid, vitamin C, (VITAMIN C) 500 MG tablet Take 500 mg by mouth once daily.      buPROPion (WELLBUTRIN XL) 300 MG 24 hr tablet Take 1 tablet (300 mg total) by mouth once daily. 90 tablet 3    calcium carbonate (OS-KATELYN) 500 mg calcium (1,250 mg) chewable tablet Take 2 tablets by mouth once daily. 300 mg gummy chewable      levocetirizine (XYZAL) 5 MG tablet Take 1 tablet (5 mg total) by mouth nightly as needed for Allergies. 30 tablet 11    losartan (COZAAR) 100 MG tablet Take 1 tablet (100 mg total) by mouth once daily. 90 tablet 3    lovastatin (MEVACOR) 20 MG tablet TAKE 1 TABLET(20 MG) BY MOUTH EVERY EVENING (Patient taking differently: Take 20 mg by mouth every evening.) 90 tablet 0    multivitamin capsule Take 1 capsule by mouth once daily.      vit A/vit C/vit E/zinc/copper (OCUVITE PRESERVISION ORAL) Take 2 tablets by mouth once daily.      [DISCONTINUED] albuterol (ACCUNEB) 1.25 mg/3 mL Nebu Take 1.25 mg by " nebulization every 6 (six) hours as needed.      [DISCONTINUED] donepeziL (ARICEPT) 10 MG tablet TAKE 1 TABLET(10 MG) BY MOUTH EVERY EVENING (Patient not taking: Reported on 1/24/2024) 90 tablet 2    [DISCONTINUED] omeprazole (PRILOSEC) 20 MG capsule Take 1 capsule (20 mg total) by mouth once daily. (Patient not taking: Reported on 1/24/2024) 90 capsule 3    [DISCONTINUED] ondansetron (ZOFRAN-ODT) 4 MG TbDL Take 1 tablet (4 mg total) by mouth every 12 (twelve) hours as needed (N/V). (Patient not taking: Reported on 1/24/2024) 20 tablet 0    [DISCONTINUED] UNABLE TO FIND Take 1,053 mg by mouth once daily. Brain support               Osmany Weiner  EXT 1924                .

## 2024-02-15 NOTE — HPI
89 WF with COPD hx not on oxygen , still smokes ,  has dementia ,  HTN .     Smoker  Lives at home with daughter  Gets around on her own with cane or walker     She was in usual state of health and tripped over a bucket at home and fell on her left side.    She was then having a lot of pain the daughter said and could not bear weight on the left leg.      So they brought her in to our ER.       Here she was found to have Acute fractures of the left superior and inferior pelvic rami     They spoke to orthopedics who said it was non operative and will need PT and conservative mgt    Plan was to admit her and control pain and get PT eval and then maybe placement     Daughter was in agreement with all of this     They did confirm that she is DNR       Patient was unable to give me any history     She got small morphine IV dose in ER and then became very unresponsive and bradycardic   So then she got some narcan doses   They thought she had some deviation of eyes so they sent her for second CT of her head.  The first CT head on arrival was normal.

## 2024-02-15 NOTE — PLAN OF CARE
[facility]  Initial Discharge Assessment    Met with pt who had son Nicolas, daughter in law Christine, and daughter Ania (who is POA) [phone] present. They completed assessment. Pt lives with son and daughter in law in a SSH. They assist her in her care. DME is walker, rollator, wc, cane.  HH was recently started by PCP and unsure of company. They would like to have referrals sent to [facility] with [facility] being first choice. Will need to send referrals when PT and OT have notes in chart. CM will continue to follow.    Primary Care Provider: Ollie Bartlett MD    Admission Diagnosis: Closed fracture of ramus of left pubis, initial encounter [S32.592A]    Admission Date: 2/14/2024  Expected Discharge Date:     Transition of Care Barriers: None    Payor: [facility] MEDICARE / Plan: [place] / Product Type: Medicare Advantage /     Extended Emergency Contact Information  Primary Emergency Contact: Nicolas Gates  Address: [address] [place] [address] [facility]  Home Phone: [phone]  Mobile Phone: [phone]  Relation: Son  Preferred language: English   needed? No  Secondary Emergency Contact: christine gates  Mobile Phone: [phone]  Relation: Daughter   needed? No    Discharge Plan A: Skilled Nursing Facility  Discharge Plan B: Skilled Nursing Facility      [facility] [address]  Phone: [phone] Fax: [phone]    [id number] [address] [facility] [address] & [facility]  [address] [facility] [address]  Phone: [phone] Fax: [phone]      Initial Assessment (most recent)       Adult Discharge Assessment - 02/15/24 1751          Discharge Assessment    Assessment Type Discharge Planning Assessment     Confirmed/corrected address, phone number and  insurance Yes     Confirmed Demographics Correct on Facesheet     Source of Information family     People in Home child(martha), adult     Facility Arrived From: Home-lives with son and daughter in law     Do you expect to return to your current living situation? No     Do you have help at home or someone to help you manage your care at home? Yes     Who are your caregiver(s) and their phone number(s)? Son and daughter in law     Prior to hospitilization cognitive status: Unable to Assess     Current cognitive status: Unable to Assess     Walking or Climbing Stairs Difficulty yes     Mobility Management Was previously walking but now with pain     Dressing/Bathing Difficulty yes     Dressing/Bathing Management Since falling-will need DME recommendations     Home Layout Able to live on 1st floor   [facility] with one step through garage, 3 in front    Equipment Currently Used at Home cane, straight;rollator;wheelchair;walker, rolling     Readmission within 30 days? No     Patient currently being followed by outpatient case management? No     Do you currently have service(s) that help you manage your care at home? Yes     Name and Contact number of agency Uncertain of name of agency     Is the pt/caregiver preference to resume services with current agency Yes     Do you have prescription coverage? Yes     Do you have any problems affording any of your prescribed medications? No     Is the patient taking medications as prescribed? yes     Who is going to help you get home at discharge? Family (feel she will need SNF prior to return home)     How do you get to doctors appointments? family or friend will provide     Are you on dialysis? No     Do you take coumadin? No     Discharge Plan A Skilled Nursing Facility     Discharge Plan B Skilled Nursing Facility     DME Needed Upon Discharge  none     Discharge Plan discussed with: Adult children     Name(s) and Number(s) Son Nicolas, daughter in law Christine, and daughter Ania      Transition of Care Barriers None

## 2024-02-16 LAB
ALBUMIN SERPL BCP-MCNC: 3.1 G/DL (ref 3.5–5.2)
ALP SERPL-CCNC: 50 U/L (ref 55–135)
ALT SERPL W/O P-5'-P-CCNC: 9 U/L (ref 10–44)
ANION GAP SERPL CALC-SCNC: 5 MMOL/L (ref 8–16)
AST SERPL-CCNC: 14 U/L (ref 10–40)
BASOPHILS # BLD AUTO: 0.04 K/UL (ref 0–0.2)
BASOPHILS NFR BLD: 0.4 % (ref 0–1.9)
BILIRUB SERPL-MCNC: 0.7 MG/DL (ref 0.1–1)
BUN SERPL-MCNC: 20 MG/DL (ref 10–30)
CALCIUM SERPL-MCNC: 8.2 MG/DL (ref 8.7–10.5)
CHLORIDE SERPL-SCNC: 103 MMOL/L (ref 95–110)
CO2 SERPL-SCNC: 26 MMOL/L (ref 23–29)
CREAT SERPL-MCNC: 1 MG/DL (ref 0.5–1.4)
DIFFERENTIAL METHOD BLD: ABNORMAL
EOSINOPHIL # BLD AUTO: 0 K/UL (ref 0–0.5)
EOSINOPHIL NFR BLD: 0.3 % (ref 0–8)
ERYTHROCYTE [DISTWIDTH] IN BLOOD BY AUTOMATED COUNT: 13.1 % (ref 11.5–14.5)
EST. GFR  (NO RACE VARIABLE): 52.9 ML/MIN/1.73 M^2
GLUCOSE SERPL-MCNC: 118 MG/DL (ref 70–110)
HCT VFR BLD AUTO: 36.5 % (ref 37–48.5)
HGB BLD-MCNC: 11.7 G/DL (ref 12–16)
IMM GRANULOCYTES # BLD AUTO: 0.06 K/UL (ref 0–0.04)
IMM GRANULOCYTES NFR BLD AUTO: 0.6 % (ref 0–0.5)
LYMPHOCYTES # BLD AUTO: 0.9 K/UL (ref 1–4.8)
LYMPHOCYTES NFR BLD: 9.4 % (ref 18–48)
MAGNESIUM SERPL-MCNC: 1.8 MG/DL (ref 1.6–2.6)
MCH RBC QN AUTO: 31.3 PG (ref 27–31)
MCHC RBC AUTO-ENTMCNC: 32.1 G/DL (ref 32–36)
MCV RBC AUTO: 98 FL (ref 82–98)
MONOCYTES # BLD AUTO: 0.7 K/UL (ref 0.3–1)
MONOCYTES NFR BLD: 7.2 % (ref 4–15)
NEUTROPHILS # BLD AUTO: 8.2 K/UL (ref 1.8–7.7)
NEUTROPHILS NFR BLD: 82.1 % (ref 38–73)
NRBC BLD-RTO: 0 /100 WBC
PLATELET # BLD AUTO: 234 K/UL (ref 150–450)
PMV BLD AUTO: 10.6 FL (ref 9.2–12.9)
POTASSIUM SERPL-SCNC: 4.2 MMOL/L (ref 3.5–5.1)
PROT SERPL-MCNC: 5.8 G/DL (ref 6–8.4)
RBC # BLD AUTO: 3.74 M/UL (ref 4–5.4)
SODIUM SERPL-SCNC: 134 MMOL/L (ref 136–145)
WBC # BLD AUTO: 10.01 K/UL (ref 3.9–12.7)

## 2024-02-16 PROCEDURE — 63600175 PHARM REV CODE 636 W HCPCS: Performed by: INTERNAL MEDICINE

## 2024-02-16 PROCEDURE — 12000002 HC ACUTE/MED SURGE SEMI-PRIVATE ROOM

## 2024-02-16 PROCEDURE — 94640 AIRWAY INHALATION TREATMENT: CPT

## 2024-02-16 PROCEDURE — 97530 THERAPEUTIC ACTIVITIES: CPT

## 2024-02-16 PROCEDURE — 80053 COMPREHEN METABOLIC PANEL: CPT | Performed by: INTERNAL MEDICINE

## 2024-02-16 PROCEDURE — 85025 COMPLETE CBC W/AUTO DIFF WBC: CPT | Performed by: INTERNAL MEDICINE

## 2024-02-16 PROCEDURE — 94761 N-INVAS EAR/PLS OXIMETRY MLT: CPT

## 2024-02-16 PROCEDURE — 83735 ASSAY OF MAGNESIUM: CPT | Performed by: INTERNAL MEDICINE

## 2024-02-16 PROCEDURE — 99900031 HC PATIENT EDUCATION (STAT)

## 2024-02-16 PROCEDURE — 99900035 HC TECH TIME PER 15 MIN (STAT)

## 2024-02-16 PROCEDURE — 36415 COLL VENOUS BLD VENIPUNCTURE: CPT | Performed by: INTERNAL MEDICINE

## 2024-02-16 PROCEDURE — 94760 N-INVAS EAR/PLS OXIMETRY 1: CPT

## 2024-02-16 PROCEDURE — 25000242 PHARM REV CODE 250 ALT 637 W/ HCPCS: Performed by: INTERNAL MEDICINE

## 2024-02-16 PROCEDURE — 27000221 HC OXYGEN, UP TO 24 HOURS

## 2024-02-16 PROCEDURE — 25000003 PHARM REV CODE 250: Performed by: INTERNAL MEDICINE

## 2024-02-16 PROCEDURE — 94799 UNLISTED PULMONARY SVC/PX: CPT

## 2024-02-16 RX ADMIN — IPRATROPIUM BROMIDE AND ALBUTEROL SULFATE 3 ML: 2.5; .5 SOLUTION RESPIRATORY (INHALATION) at 12:02

## 2024-02-16 RX ADMIN — BUPROPION HYDROCHLORIDE 300 MG: 150 TABLET, EXTENDED RELEASE ORAL at 09:02

## 2024-02-16 RX ADMIN — BUDESONIDE 0.25 MG: 0.5 INHALANT RESPIRATORY (INHALATION) at 08:02

## 2024-02-16 RX ADMIN — ARFORMOTEROL TARTRATE 15 MCG: 15 SOLUTION RESPIRATORY (INHALATION) at 08:02

## 2024-02-16 RX ADMIN — SODIUM CHLORIDE AND POTASSIUM CHLORIDE: .9; .15 SOLUTION INTRAVENOUS at 03:02

## 2024-02-16 RX ADMIN — SENNOSIDES AND DOCUSATE SODIUM 1 TABLET: 8.6; 5 TABLET ORAL at 09:02

## 2024-02-16 RX ADMIN — IPRATROPIUM BROMIDE AND ALBUTEROL SULFATE 3 ML: 2.5; .5 SOLUTION RESPIRATORY (INHALATION) at 08:02

## 2024-02-16 RX ADMIN — IPRATROPIUM BROMIDE AND ALBUTEROL SULFATE 3 ML: 2.5; .5 SOLUTION RESPIRATORY (INHALATION) at 01:02

## 2024-02-16 RX ADMIN — ENOXAPARIN SODIUM 30 MG: 30 INJECTION SUBCUTANEOUS at 05:02

## 2024-02-16 RX ADMIN — POTASSIUM CHLORIDE 10 MEQ: 750 CAPSULE, EXTENDED RELEASE ORAL at 09:02

## 2024-02-16 RX ADMIN — ACETAMINOPHEN 1000 MG: 500 TABLET ORAL at 01:02

## 2024-02-16 NOTE — RESPIRATORY THERAPY
PT did not have O2 on again. SPO2 low 80's.  Changed to bubble high flow nasal cannula instead of oxymask.  RN and MD notified.  Will continue to monitor.

## 2024-02-16 NOTE — ASSESSMENT & PLAN NOTE
Patient has hypokalemia which is Acute and currently controlled. Most recent potassium levels reviewed-   Lab Results   Component Value Date    K 4.2 02/16/2024   . Will continue potassium replacement per protocol and recheck repeat levels after replacement completed.

## 2024-02-16 NOTE — PROGRESS NOTES
[facility] Medicine  Progress Note    Patient Name: Marietta Gates  MRN: [id number]  Patient Class: IP- Inpatient   Admission Date: 2/14/2024  Length of Stay: 1 days  Attending Physician: Toan Porter MD  Primary Care Provider: Ollie Bartlett MD        Subjective:     Principal Problem:Closed fracture of left hip        HPI:  89 WF with COPD hx not on oxygen , still smokes ,  has dementia ,  HTN .     Smoker  Lives at home with daughter  Gets around on her own with cane or walker     She was in usual state of health and tripped over a bucket at home and fell on her left side.    She was then having a lot of pain the daughter said and could not bear weight on the left leg.      So they brought her in to our ER.       Here she was found to have Acute fractures of the left superior and inferior pelvic rami     They spoke to orthopedics who said it was non operative and will need PT and conservative mgt    Plan was to admit her and control pain and get PT eval and then maybe placement     Daughter was in agreement with all of this     They did confirm that she is DNR       Patient was unable to give me any history     She got small morphine IV dose in ER and then became very unresponsive and bradycardic   So then she got some narcan doses   They thought she had some deviation of eyes so they sent her for second CT of her head.  The first CT head on arrival was normal.        Overview/Hospital Course:  Patient admitted to Med/Surg.  Orthopedic Surgery consulted for acute fractures of the left superior and inferior pubic rami.  Non-operative, conservative management per Ortho, patient can weight bear as tolerated.  PT/OT consulted.  Patient with a negative reaction to Morphine requiring narcan administration.  Pain controlled with Tylenol.  SNF recommended per PT.      Interval History: Patient seen sitting up in bed eating.  NAD.  Reports pain to left hip well controlled.  No  issues reported overnight.  No new complaints per patient.       Review of Systems   Constitutional:  Positive for activity change. Negative for chills.   HENT:  Negative for congestion.    Respiratory:  Negative for cough and shortness of breath.    Cardiovascular:  Negative for chest pain.   Gastrointestinal:  Negative for abdominal pain, nausea and vomiting.   Musculoskeletal:  Positive for arthralgias.   Neurological:  Negative for dizziness.     Objective:     Vital Signs (Most Recent):  Temp: 97.6 °F (36.4 °C) (02/16/24 1100)  Pulse: 75 (02/16/24 1321)  Resp: 20 (02/16/24 1321)  BP: (!) 154/65 (notified nurse) (02/16/24 1100)  SpO2: (!) 91 % (02/16/24 1321) Vital Signs (24h Range):  Temp:  [97.5 °F (36.4 °C)-98.5 °F (36.9 °C)] 97.6 °F (36.4 °C)  Pulse:  [70-85] 75  Resp:  [16-28] 20  SpO2:  [84 %-98 %] 91 %  BP: (138-184)/(61-74) 154/65     Weight: 54.4 kg (120 lb)  Body mass index is 21.26 kg/m².    Intake/Output Summary (Last 24 hours) at 2/16/2024 1451  Last data filed at 2/16/2024 0955  Gross per 24 hour   Intake 1460 ml   Output 250 ml   Net 1210 ml         Physical Exam  Vitals and nursing note reviewed.   Constitutional:       General: She is not in acute distress.  HENT:      Mouth/Throat:      Mouth: Mucous membranes are moist.   Eyes:      Pupils: Pupils are equal, round, and reactive to light.   Cardiovascular:      Rate and Rhythm: Normal rate and regular rhythm.      Heart sounds: Normal heart sounds.   Pulmonary:      Effort: Pulmonary effort is normal.      Breath sounds: Normal breath sounds.   Abdominal:      Palpations: Abdomen is soft.      Tenderness: There is no abdominal tenderness.   Musculoskeletal:         General: Tenderness present.   Skin:     General: Skin is warm.   Neurological:      Mental Status: She is alert. Mental status is at baseline.   Psychiatric:         Mood and Affect: Mood normal.             Significant Labs: All pertinent labs within the past 24 hours have been  reviewed.  CBC:   Recent Labs   Lab 02/15/24  0001 02/15/24  0455 02/16/24  0541   WBC 13.42* 11.82 10.01   HGB 13.7 12.6 11.7*   HCT 42.3 39.3 36.5*    258 234     CMP:   Recent Labs   Lab 02/15/24  0001 02/15/24  0455 02/16/24  0541   * 135* 134*   K 3.4* 4.3 4.2   CL 98 103 103   CO2 28 22* 26   GLU 95 136* 118*   BUN 20 23 20   CREATININE 1.2 1.3 1.0   CALCIUM 9.2 8.7 8.2*   PROT 7.6 6.6 5.8*   ALBUMIN 3.7 3.4* 3.1*   BILITOT 0.5 0.6 0.7   ALKPHOS 61 52* 50*   AST 16 14 14   ALT 14 10 9*   ANIONGAP 9 10 5*     Magnesium:   Recent Labs   Lab 02/15/24  0001 02/15/24  0455 02/16/24  0541   MG 2.1 2.0 1.8       Significant Imaging: I have reviewed all pertinent imaging results/findings within the past 24 hours.    Assessment/Plan:      * Closed fracture of left hip  S/P mechanical fall   Acute fractures of the left superior and inferior pelvic rami   Orthopedic Surgery consulted  Non-operative, conservative management   PT/OT consulted   Tylenol for pain control.  Patient had drop in O2 sat with change in mental status after being given morphine in the ER.  Avoid Narcotics.         Multiple stable closed lateral compression fractures of pelvis  Ortho consulted   PT/OT      Hypokalemia  Patient has hypokalemia which is Acute and currently controlled. Most recent potassium levels reviewed-   Lab Results   Component Value Date    K 4.2 02/16/2024   . Will continue potassium replacement per protocol and recheck repeat levels after replacement completed.     Encephalopathy, toxic  From 2 mg IV morphine  Got IV narcan twice   Head CT repeated did not show anything new or acute findings   Mentation improved following Narcan.   Avoid narcotics         VTE Risk Mitigation (From admission, onward)           Ordered     enoxaparin injection 30 mg  Daily        Note to Pharmacy: Ht:    Wt: 49.9 kg (110 lb)  Estimated Creatinine Clearance: 23.6 mL/min (based on SCr of 1.2 mg/dL).  Body mass index is 18.88 kg/m².     02/15/24 0054     IP VTE HIGH RISK PATIENT  Once         02/15/24 0052     Place sequential compression device  Until discontinued         02/15/24 0052                    Discharge Planning   KENNY: 2/20/2024     Code Status: DNR   Is the patient medically ready for discharge?:     Reason for patient still in hospital (select all that apply): PT / OT recommendations and Pending disposition  Discharge Plan A: Skilled Nursing Facility   Discharge Delays: None known at this time              Crista Wilcox NP  Department of Hospital Medicine   [facility]

## 2024-02-16 NOTE — ASSESSMENT & PLAN NOTE
From 2 mg IV morphine  Got IV narcan twice   Head CT repeated did not show anything new or acute findings   Mentation improved following Narcan.   Avoid narcotics

## 2024-02-16 NOTE — NURSING
Nurses Note -- 4 Eyes      2/16/2024   5:11 AM      Skin assessed during: Daily Assessment    No changes noted  [x] No Altered Skin Integrity Present    []Prevention Measures Documented      [] Yes- Altered Skin Integrity Present or Discovered   [] LDA Added if Not in Epic (Describe Wound)   [] New Altered Skin Integrity was Present on Admit and Documented in LDA   [] Wound Image Taken    Wound Care Consulted? No    Attending Nurse:  Disha Barcenas RN/Staff Member:   Keyla

## 2024-02-16 NOTE — PLAN OF CARE
[facility]; pending auth    02/16/24 1412   Post-Acute Status   Post-Acute Authorization Placement   Post-Acute Placement Status Pending payor review/awaiting authorization (if required)   Discharge Delays None known at this time   Discharge Plan   Discharge Plan A Skilled Nursing Facility   Discharge Plan B Skilled Nursing Facility

## 2024-02-16 NOTE — PLAN OF CARE
02/16/24 0801   Patient Assessment/Suction   Level of Consciousness (AVPU) alert   Respiratory Effort Normal;Unlabored   Expansion/Accessory Muscles/Retractions no use of accessory muscles   All Lung Fields Breath Sounds Anterior:;Lateral:;diminished;wheezes, expiratory   Rhythm/Pattern, Respiratory pattern regular   Cough Frequency infrequent   Cough Type nonproductive   PRE-TX-O2   Device (Oxygen Therapy) Oxymask   $ Is the patient on Low Flow Oxygen? Yes   Flow (L/min) 5   SpO2 (!) 90 %   Pulse Oximetry Type Intermittent   $ Pulse Oximetry - Single Charge Pulse Oximetry - Single   Pulse 80   Resp 20   Aerosol Therapy   $ Aerosol Therapy Charges Aerosol Treatment   Daily Review of Necessity (SVN) completed   Respiratory Treatment Status (SVN) given   Treatment Route (SVN) mask;oxygen   Patient Position (SVN) HOB elevated   Post Treatment Assessment (SVN) breath sounds improved   Signs of Intolerance (SVN) none   Breath Sounds Post-Respiratory Treatment   Throughout All Fields Post-Treatment All Fields   Throughout All Fields Post-Treatment aeration increased   Post-treatment Heart Rate (beats/min) 88   Post-treatment Resp Rate (breaths/min) 21   General Safety Checklist   Safety Promotion/Fall Prevention side rails raised   Respiratory Interventions   Cough And Deep Breathing done with encouragement   Education   $ Education Bronchodilator;15 min

## 2024-02-16 NOTE — ASSESSMENT & PLAN NOTE
S/P mechanical fall   Acute fractures of the left superior and inferior pelvic rami   Orthopedic Surgery consulted  Non-operative, conservative management   PT/OT consulted   Tylenol for pain control.  Patient had drop in O2 sat with change in mental status after being given morphine in the ER.  Avoid Narcotics.

## 2024-02-16 NOTE — PLAN OF CARE
MAMADOU sent SNF referrals via careport to  of [facility] awaiting accepting facility.  Mahendra with  notified of referral.     2:11pm-  [facility]      02/16/24 0919   Post-Acute Status   Post-Acute Authorization Placement   Post-Acute Placement Status Referrals Sent   Discharge Delays None known at this time   Discharge Plan   Discharge Plan A Skilled Nursing Facility   Discharge Plan B Skilled Nursing Facility

## 2024-02-16 NOTE — HOSPITAL COURSE
Patient admitted to Med/Surg.  Orthopedic Surgery consulted for acute fractures of the left superior and inferior pubic rami.  Non-operative, conservative management per Ortho, patient can weight bear as tolerated.  PT/OT consulted.  Patient with a negative reaction to Morphine requiring narcan administration.  Pain controlled with Tylenol.  Patient had increased oxygen requirements escalating to 15 L/min.  CXR with pulmonary edema.  IV lasix started.  IV solumedrol also started.  ABG with PH 7.448, PCO2 30.5, PO2 49, HCO3 21.1.  Patient placed on Bipap and orders placed to transfer to Stepdown 2/17.  Discussed with patient's family, confirmed DNR status.  Oxygenation improving, weaned off of bipap. CXR more consistent with COPD, doesn't seem overloaded. ST consulted. Repeat CXR showed concerns for PNA, abx started. ST evaluated the patient, placed on minced diet. Plan for discharge to rehab if patient remains stable.  Oxygen was able to be weaned down.  Patient will be discharged on oral antibiotics.  Patient was also started on inhalers for her COPD.  Patient will need follow up with pulmonology, referral given.  Patient also had Cloud placed for urinary retention, will need follow up with Urology outpatient for voiding trial or this can be done as skilled nursing facility.    Physical Exam  Constitutional:       General: She is not in acute distress.  HENT:      Mouth/Throat:      Mouth: Mucous membranes are moist.   Cardiovascular:      Rate and Rhythm: Normal rate and regular rhythm.      Heart sounds: Normal heart sounds.   Pulmonary:      Effort: No respiratory distress.   Abdominal:      Palpations: Abdomen is soft

## 2024-02-16 NOTE — SUBJECTIVE & OBJECTIVE
Interval History: Patient seen sitting up in bed eating.  NAD.  Reports pain to left hip well controlled.  No issues reported overnight.  No new complaints per patient.       Review of Systems   Constitutional:  Positive for activity change. Negative for chills.   HENT:  Negative for congestion.    Respiratory:  Negative for cough and shortness of breath.    Cardiovascular:  Negative for chest pain.   Gastrointestinal:  Negative for abdominal pain, nausea and vomiting.   Musculoskeletal:  Positive for arthralgias.   Neurological:  Negative for dizziness.     Objective:     Vital Signs (Most Recent):  Temp: 97.6 °F (36.4 °C) (02/16/24 1100)  Pulse: 75 (02/16/24 1321)  Resp: 20 (02/16/24 1321)  BP: (!) 154/65 (notified nurse) (02/16/24 1100)  SpO2: (!) 91 % (02/16/24 1321) Vital Signs (24h Range):  Temp:  [97.5 °F (36.4 °C)-98.5 °F (36.9 °C)] 97.6 °F (36.4 °C)  Pulse:  [70-85] 75  Resp:  [16-28] 20  SpO2:  [84 %-98 %] 91 %  BP: (138-184)/(61-74) 154/65     Weight: 54.4 kg (120 lb)  Body mass index is 21.26 kg/m².    Intake/Output Summary (Last 24 hours) at 2/16/2024 1451  Last data filed at 2/16/2024 0955  Gross per 24 hour   Intake 1460 ml   Output 250 ml   Net 1210 ml         Physical Exam  Vitals and nursing note reviewed.   Constitutional:       General: She is not in acute distress.  HENT:      Mouth/Throat:      Mouth: Mucous membranes are moist.   Eyes:      Pupils: Pupils are equal, round, and reactive to light.   Cardiovascular:      Rate and Rhythm: Normal rate and regular rhythm.      Heart sounds: Normal heart sounds.   Pulmonary:      Effort: Pulmonary effort is normal.      Breath sounds: Normal breath sounds.   Abdominal:      Palpations: Abdomen is soft.      Tenderness: There is no abdominal tenderness.   Musculoskeletal:         General: Tenderness present.   Skin:     General: Skin is warm.   Neurological:      Mental Status: She is alert. Mental status is at baseline.   Psychiatric:         Mood  and Affect: Mood normal.             Significant Labs: All pertinent labs within the past 24 hours have been reviewed.  CBC:   Recent Labs   Lab 02/15/24  0001 02/15/24  0455 02/16/24  0541   WBC 13.42* 11.82 10.01   HGB 13.7 12.6 11.7*   HCT 42.3 39.3 36.5*    258 234     CMP:   Recent Labs   Lab 02/15/24  0001 02/15/24  0455 02/16/24  0541   * 135* 134*   K 3.4* 4.3 4.2   CL 98 103 103   CO2 28 22* 26   GLU 95 136* 118*   BUN 20 23 20   CREATININE 1.2 1.3 1.0   CALCIUM 9.2 8.7 8.2*   PROT 7.6 6.6 5.8*   ALBUMIN 3.7 3.4* 3.1*   BILITOT 0.5 0.6 0.7   ALKPHOS 61 52* 50*   AST 16 14 14   ALT 14 10 9*   ANIONGAP 9 10 5*     Magnesium:   Recent Labs   Lab 02/15/24  0001 02/15/24  0455 02/16/24  0541   MG 2.1 2.0 1.8       Significant Imaging: I have reviewed all pertinent imaging results/findings within the past 24 hours.

## 2024-02-16 NOTE — NURSING
Nurses Note -- 4 Eyes      2/16/2024   7:42 AM      Skin assessed during: Q Shift Change      [x] No Altered Skin Integrity Present    []Prevention Measures Documented      [] Yes- Altered Skin Integrity Present or Discovered   [] LDA Added if Not in Epic (Describe Wound)   [] New Altered Skin Integrity was Present on Admit and Documented in LDA   [] Wound Image Taken    Wound Care Consulted? No    Attending Nurse:  Favian Barcenas RN/Staff Member:  Disha

## 2024-02-16 NOTE — RESPIRATORY THERAPY
PT wasn't wearing oxymask & SPO2 was 78%.  Placed on 6L on oxymask.  PT was wheezing and coarse.  Spoke w/ RN about putting PT on continuous pulse ox.  Messaged Dr. Zhang about scheduled breathing treatments.  Will continue to monitor.

## 2024-02-16 NOTE — PT/OT/SLP PROGRESS
Physical Therapy Treatment    Patient Name:  Marietta Gates   MRN:  [id number]    Recommendations:     Discharge Recommendations: Moderate Intensity Therapy  Discharge Equipment Recommendations: to be determined by next level of care  Barriers to discharge:  pain limiting functional mobility    Assessment:     Marietta Gates is a 89 y.o. female admitted with a medical diagnosis of Closed fracture of left hip.  She presents with the following impairments/functional limitations: weakness, impaired endurance, impaired self care skills, impaired functional mobility, gait instability, impaired balance, impaired cognition, decreased safety awareness, decreased lower extremity function, pain, impaired cardiopulmonary response to activity .    Rehab Prognosis: Fair; patient would benefit from acute skilled PT services to address these deficits and reach maximum level of function.    Recent Surgery: * No surgery found *      Plan:     During this hospitalization, patient to be seen 6 x/week to address the identified rehab impairments via gait training, therapeutic activities, therapeutic exercises and progress toward the following goals:    Plan of Care Expires:  03/15/24    Subjective     Chief Complaint: pain   Patient/Family Comments/goals:SNF before return home with family  Pain/Comfort:         Objective:     Communicated with nurse prior to session.  Patient found supine with oxygen, PureWick, peripheral IV, bed alarm upon PT entry to room.     General Precautions: Standard, fall  Orthopedic Precautions: LLE weight bearing as tolerated  Braces:    Respiratory Status: Nasal cannula, flow 2 L/min     Functional Mobility:  Bed Mobility:     Supine to Sit: moderate assistance and of 2 persons  Sit to Supine: maximal assistance and of 2 persons  Transfers:     Sit to Stand:  moderate assistance with rolling walker . Stood for 10 seconds before pt initiated return to EOB  Balance: Min A for sitting EOB      AM-PAC  6 CLICK MOBILITY          Treatment & Education:  Functional mobility as indicated above.     Patient left supine with all lines intact, call button in reach, and bed alarm on..    GOALS:   Multidisciplinary Problems       Physical Therapy Goals          Problem: Physical Therapy    Goal Priority Disciplines Outcome Goal Variances Interventions   Physical Therapy Goal     PT, PT/OT      Description: Goals to be met by: 3/15/2024     Patient will increase functional independence with mobility by performin. Supine to sit with Moderate Assistance  2. Sit to supine with Moderate Assistance  3. Bed to chair transfer with Moderate Assistance using No Assistive Device  4. Gait  x 10  feet with Minimal Assistance using Rolling Walker.                          Time Tracking:     PT Received On: 24  PT Start Time: 1445     PT Stop Time: 1500  PT Total Time (min): 15 min     Billable Minutes: Therapeutic Activity 15 minutes    Treatment Type: Treatment  PT/PTA: PT           2024

## 2024-02-16 NOTE — PT/OT/SLP PROGRESS
Occupational Therapy   Treatment    Name: Marietta Gates  MRN: [id number]  Admitting Diagnosis:  Closed fracture of left hip       Recommendations:     Discharge Recommendations: Moderate Intensity Therapy  Discharge Equipment Recommendations:  bedside commode, walker, rolling  Barriers to discharge:  Decreased caregiver support    Assessment:     Marietta Gates is a 89 y.o. female with a medical diagnosis of Closed fracture of left hip.  She presents with general weakness. Performance deficits affecting function are weakness, impaired endurance, impaired sensation, impaired self care skills, impaired functional mobility, gait instability, impaired balance, visual deficits, impaired cognition, decreased lower extremity function, decreased upper extremity function, decreased safety awareness.     Rehab Prognosis:  Fair; patient would benefit from acute skilled OT services to address these deficits and reach maximum level of function.       Plan:     Patient to be seen 5 x/week to address the above listed problems via self-care/home management, therapeutic activities, therapeutic exercises  Plan of Care Expires: 03/16/24  Plan of Care Reviewed with: patient    Subjective     Chief Complaint: General weakness  Patient/Family Comments/goals: Improved functional mobility and Adl independence.  Pain/Comfort:  Pain Rating 1: 5/10  Location 1:  (Left pubic rami area)  Pain Addressed 1: Reposition, Distraction  Pain Rating Post-Intervention 1: 5/10    Objective:     Communicated with: nurse prior to session.  Patient found HOB elevated with oxygen, PureWick, peripheral IV upon OT entry to room.    General Precautions: Standard, fall    Orthopedic Precautions:LLE weight bearing as tolerated  Braces: N/A  Respiratory Status:  5L O2 via Oxi-mask     Occupational Performance:     Bed Mobility:    Patient completed Scooting/Bridging with maximal assistance  Patient completed Supine to Sit with maximal  assistance  Patient completed Sit to Supine with maximal assistance   Performed unsupported sitting EOB for 4 minutes with moderate assistan    AMPA 6 Click ADL:      Treatment & Education:  Patient required increased physical assistance for mobility and sitting balance.     Patient left HOB elevated with all lines intact, call button in reach, and bed alarm on    GOALS:   Multidisciplinary Problems       Occupational Therapy Goals          Problem: Occupational Therapy    Goal Priority Disciplines Outcome Interventions   Occupational Therapy Goal     OT, PT/OT     Description: Goals to be met by: 3/15/24     Patient will increase functional independence with ADLs by performing:    UE Dressing with Minimal Assistance.  LE Dressing with Moderate Assistance.  Grooming while seated with Stand-by Assistance.  Toileting from toilet with Minimal Assistance for hygiene and clothing management.   Toilet transfer to toilet with Minimal Assistance.                         Time Tracking:     OT Date of Treatment: 02/16/24  OT Start Time: 1036  OT Stop Time: 1048  OT Total Time (min): 12 min    Billable Minutes:Therapeutic Activity 12    OT/DOROTA: OT          2/16/2024

## 2024-02-17 PROBLEM — R09.02 HYPOXEMIA: Status: ACTIVE | Noted: 2024-02-17

## 2024-02-17 PROBLEM — Z71.89 ADVANCED CARE PLANNING/COUNSELING DISCUSSION: Status: ACTIVE | Noted: 2024-02-17

## 2024-02-17 LAB
ALBUMIN SERPL BCP-MCNC: 3.1 G/DL (ref 3.5–5.2)
ALLENS TEST: ABNORMAL
ALP SERPL-CCNC: 49 U/L (ref 55–135)
ALT SERPL W/O P-5'-P-CCNC: 7 U/L (ref 10–44)
ANION GAP SERPL CALC-SCNC: 8 MMOL/L (ref 8–16)
ANION GAP SERPL CALC-SCNC: 9 MMOL/L (ref 8–16)
AST SERPL-CCNC: 12 U/L (ref 10–40)
BASOPHILS # BLD AUTO: 0.03 K/UL (ref 0–0.2)
BASOPHILS NFR BLD: 0.2 % (ref 0–1.9)
BILIRUB SERPL-MCNC: 0.8 MG/DL (ref 0.1–1)
BNP SERPL-MCNC: 212 PG/ML (ref 0–99)
BUN SERPL-MCNC: 20 MG/DL (ref 10–30)
BUN SERPL-MCNC: 20 MG/DL (ref 10–30)
CALCIUM SERPL-MCNC: 8.1 MG/DL (ref 8.7–10.5)
CALCIUM SERPL-MCNC: 8.1 MG/DL (ref 8.7–10.5)
CHLORIDE SERPL-SCNC: 101 MMOL/L (ref 95–110)
CHLORIDE SERPL-SCNC: 101 MMOL/L (ref 95–110)
CO2 SERPL-SCNC: 23 MMOL/L (ref 23–29)
CO2 SERPL-SCNC: 24 MMOL/L (ref 23–29)
CREAT SERPL-MCNC: 1 MG/DL (ref 0.5–1.4)
CREAT SERPL-MCNC: 1.1 MG/DL (ref 0.5–1.4)
DELSYS: ABNORMAL
DIFFERENTIAL METHOD BLD: ABNORMAL
EOSINOPHIL # BLD AUTO: 0 K/UL (ref 0–0.5)
EOSINOPHIL NFR BLD: 0 % (ref 0–8)
ERYTHROCYTE [DISTWIDTH] IN BLOOD BY AUTOMATED COUNT: 13.1 % (ref 11.5–14.5)
ERYTHROCYTE [DISTWIDTH] IN BLOOD BY AUTOMATED COUNT: 13.2 % (ref 11.5–14.5)
EST. GFR  (NO RACE VARIABLE): 47.1 ML/MIN/1.73 M^2
EST. GFR  (NO RACE VARIABLE): 52.9 ML/MIN/1.73 M^2
FLOW: 10
GLUCOSE SERPL-MCNC: 133 MG/DL (ref 70–110)
GLUCOSE SERPL-MCNC: 135 MG/DL (ref 70–110)
GLUCOSE SERPL-MCNC: 135 MG/DL (ref 70–110)
HCO3 UR-SCNC: 21.1 MMOL/L (ref 24–28)
HCT VFR BLD AUTO: 35.3 % (ref 37–48.5)
HCT VFR BLD AUTO: 35.3 % (ref 37–48.5)
HCT VFR BLD CALC: 41 %PCV (ref 36–54)
HGB BLD-MCNC: 11.6 G/DL (ref 12–16)
HGB BLD-MCNC: 11.8 G/DL (ref 12–16)
IMM GRANULOCYTES # BLD AUTO: 0.17 K/UL (ref 0–0.04)
IMM GRANULOCYTES NFR BLD AUTO: 1.2 % (ref 0–0.5)
LACTATE SERPL-SCNC: 1.7 MMOL/L (ref 0.5–1.9)
LACTATE SERPL-SCNC: 2 MMOL/L (ref 0.5–1.9)
LYMPHOCYTES # BLD AUTO: 0.6 K/UL (ref 1–4.8)
LYMPHOCYTES NFR BLD: 4 % (ref 18–48)
MAGNESIUM SERPL-MCNC: 1.6 MG/DL (ref 1.6–2.6)
MCH RBC QN AUTO: 31 PG (ref 27–31)
MCH RBC QN AUTO: 31.6 PG (ref 27–31)
MCHC RBC AUTO-ENTMCNC: 32.9 G/DL (ref 32–36)
MCHC RBC AUTO-ENTMCNC: 33.4 G/DL (ref 32–36)
MCV RBC AUTO: 94 FL (ref 82–98)
MCV RBC AUTO: 95 FL (ref 82–98)
MODE: ABNORMAL
MONOCYTES # BLD AUTO: 0.9 K/UL (ref 0.3–1)
MONOCYTES NFR BLD: 6.1 % (ref 4–15)
NEUTROPHILS # BLD AUTO: 12.9 K/UL (ref 1.8–7.7)
NEUTROPHILS NFR BLD: 88.5 % (ref 38–73)
NRBC BLD-RTO: 0 /100 WBC
PCO2 BLDA: 30.5 MMHG (ref 35–45)
PH SMN: 7.45 [PH] (ref 7.35–7.45)
PLATELET # BLD AUTO: 225 K/UL (ref 150–450)
PLATELET # BLD AUTO: 229 K/UL (ref 150–450)
PMV BLD AUTO: 10.6 FL (ref 9.2–12.9)
PMV BLD AUTO: 10.6 FL (ref 9.2–12.9)
PO2 BLDA: 49 MMHG (ref 80–100)
POC BE: -3 MMOL/L
POC IONIZED CALCIUM: 1.16 MMOL/L (ref 1.06–1.42)
POC SATURATED O2: 86 % (ref 95–100)
POC TCO2: 22 MMOL/L (ref 23–27)
POTASSIUM BLD-SCNC: 4.5 MMOL/L (ref 3.5–5.1)
POTASSIUM SERPL-SCNC: 4 MMOL/L (ref 3.5–5.1)
POTASSIUM SERPL-SCNC: 4 MMOL/L (ref 3.5–5.1)
PROCALCITONIN SERPL IA-MCNC: 3.01 NG/ML (ref 0–0.5)
PROT SERPL-MCNC: 6.5 G/DL (ref 6–8.4)
RBC # BLD AUTO: 3.73 M/UL (ref 4–5.4)
RBC # BLD AUTO: 3.74 M/UL (ref 4–5.4)
SAMPLE: ABNORMAL
SITE: ABNORMAL
SODIUM BLD-SCNC: 134 MMOL/L (ref 136–145)
SODIUM SERPL-SCNC: 133 MMOL/L (ref 136–145)
SODIUM SERPL-SCNC: 133 MMOL/L (ref 136–145)
SP02: 90
WBC # BLD AUTO: 14.6 K/UL (ref 3.9–12.7)
WBC # BLD AUTO: 15.38 K/UL (ref 3.9–12.7)

## 2024-02-17 PROCEDURE — 63600175 PHARM REV CODE 636 W HCPCS: Performed by: NURSE PRACTITIONER

## 2024-02-17 PROCEDURE — 25000242 PHARM REV CODE 250 ALT 637 W/ HCPCS: Performed by: INTERNAL MEDICINE

## 2024-02-17 PROCEDURE — 85027 COMPLETE CBC AUTOMATED: CPT | Performed by: INTERNAL MEDICINE

## 2024-02-17 PROCEDURE — 5A09357 ASSISTANCE WITH RESPIRATORY VENTILATION, LESS THAN 24 CONSECUTIVE HOURS, CONTINUOUS POSITIVE AIRWAY PRESSURE: ICD-10-PCS | Performed by: INTERNAL MEDICINE

## 2024-02-17 PROCEDURE — 94660 CPAP INITIATION&MGMT: CPT

## 2024-02-17 PROCEDURE — 83880 ASSAY OF NATRIURETIC PEPTIDE: CPT | Performed by: INTERNAL MEDICINE

## 2024-02-17 PROCEDURE — 83605 ASSAY OF LACTIC ACID: CPT | Mod: 91 | Performed by: INTERNAL MEDICINE

## 2024-02-17 PROCEDURE — 63600175 PHARM REV CODE 636 W HCPCS: Performed by: INTERNAL MEDICINE

## 2024-02-17 PROCEDURE — 36600 WITHDRAWAL OF ARTERIAL BLOOD: CPT

## 2024-02-17 PROCEDURE — 94640 AIRWAY INHALATION TREATMENT: CPT

## 2024-02-17 PROCEDURE — 82330 ASSAY OF CALCIUM: CPT

## 2024-02-17 PROCEDURE — 25000003 PHARM REV CODE 250: Performed by: NURSE PRACTITIONER

## 2024-02-17 PROCEDURE — 84132 ASSAY OF SERUM POTASSIUM: CPT

## 2024-02-17 PROCEDURE — 85025 COMPLETE CBC W/AUTO DIFF WBC: CPT | Performed by: INTERNAL MEDICINE

## 2024-02-17 PROCEDURE — 27000221 HC OXYGEN, UP TO 24 HOURS

## 2024-02-17 PROCEDURE — 99900035 HC TECH TIME PER 15 MIN (STAT)

## 2024-02-17 PROCEDURE — 21000000 HC CCU ICU ROOM CHARGE

## 2024-02-17 PROCEDURE — 80053 COMPREHEN METABOLIC PANEL: CPT | Performed by: INTERNAL MEDICINE

## 2024-02-17 PROCEDURE — 99900031 HC PATIENT EDUCATION (STAT)

## 2024-02-17 PROCEDURE — 36415 COLL VENOUS BLD VENIPUNCTURE: CPT | Performed by: INTERNAL MEDICINE

## 2024-02-17 PROCEDURE — 85014 HEMATOCRIT: CPT

## 2024-02-17 PROCEDURE — 82803 BLOOD GASES ANY COMBINATION: CPT

## 2024-02-17 PROCEDURE — 36415 COLL VENOUS BLD VENIPUNCTURE: CPT | Performed by: NURSE PRACTITIONER

## 2024-02-17 PROCEDURE — 63600175 PHARM REV CODE 636 W HCPCS: Performed by: STUDENT IN AN ORGANIZED HEALTH CARE EDUCATION/TRAINING PROGRAM

## 2024-02-17 PROCEDURE — 25000242 PHARM REV CODE 250 ALT 637 W/ HCPCS: Performed by: NURSE PRACTITIONER

## 2024-02-17 PROCEDURE — 84295 ASSAY OF SERUM SODIUM: CPT

## 2024-02-17 PROCEDURE — 94761 N-INVAS EAR/PLS OXIMETRY MLT: CPT

## 2024-02-17 PROCEDURE — 83605 ASSAY OF LACTIC ACID: CPT | Performed by: NURSE PRACTITIONER

## 2024-02-17 PROCEDURE — 83735 ASSAY OF MAGNESIUM: CPT | Performed by: INTERNAL MEDICINE

## 2024-02-17 PROCEDURE — 84145 PROCALCITONIN (PCT): CPT | Performed by: STUDENT IN AN ORGANIZED HEALTH CARE EDUCATION/TRAINING PROGRAM

## 2024-02-17 PROCEDURE — 80048 BASIC METABOLIC PNL TOTAL CA: CPT | Performed by: INTERNAL MEDICINE

## 2024-02-17 RX ORDER — LORAZEPAM 2 MG/ML
0.5 INJECTION INTRAMUSCULAR EVERY 4 HOURS PRN
Status: DISCONTINUED | OUTPATIENT
Start: 2024-02-17 | End: 2024-02-18

## 2024-02-17 RX ORDER — ACETAMINOPHEN 500 MG
1000 TABLET ORAL EVERY 8 HOURS PRN
Status: DISCONTINUED | OUTPATIENT
Start: 2024-02-17 | End: 2024-02-21 | Stop reason: HOSPADM

## 2024-02-17 RX ORDER — FUROSEMIDE 10 MG/ML
40 INJECTION INTRAMUSCULAR; INTRAVENOUS EVERY 12 HOURS
Status: DISCONTINUED | OUTPATIENT
Start: 2024-02-17 | End: 2024-02-18

## 2024-02-17 RX ORDER — KETOROLAC TROMETHAMINE 30 MG/ML
15 INJECTION, SOLUTION INTRAMUSCULAR; INTRAVENOUS EVERY 6 HOURS PRN
Status: DISPENSED | OUTPATIENT
Start: 2024-02-17 | End: 2024-02-18

## 2024-02-17 RX ORDER — IPRATROPIUM BROMIDE AND ALBUTEROL SULFATE 2.5; .5 MG/3ML; MG/3ML
3 SOLUTION RESPIRATORY (INHALATION) EVERY 4 HOURS PRN
Status: DISCONTINUED | OUTPATIENT
Start: 2024-02-17 | End: 2024-02-21 | Stop reason: HOSPADM

## 2024-02-17 RX ORDER — HYDRALAZINE HYDROCHLORIDE 20 MG/ML
10 INJECTION INTRAMUSCULAR; INTRAVENOUS EVERY 4 HOURS PRN
Status: DISCONTINUED | OUTPATIENT
Start: 2024-02-17 | End: 2024-02-21 | Stop reason: HOSPADM

## 2024-02-17 RX ORDER — FUROSEMIDE 10 MG/ML
20 INJECTION INTRAMUSCULAR; INTRAVENOUS ONCE
Status: COMPLETED | OUTPATIENT
Start: 2024-02-17 | End: 2024-02-17

## 2024-02-17 RX ORDER — ALBUTEROL SULFATE 0.83 MG/ML
2.5 SOLUTION RESPIRATORY (INHALATION) EVERY 6 HOURS PRN
Status: DISCONTINUED | OUTPATIENT
Start: 2024-02-17 | End: 2024-02-17

## 2024-02-17 RX ORDER — FUROSEMIDE 10 MG/ML
40 INJECTION INTRAMUSCULAR; INTRAVENOUS ONCE
Status: COMPLETED | OUTPATIENT
Start: 2024-02-17 | End: 2024-02-17

## 2024-02-17 RX ADMIN — CEFTRIAXONE SODIUM 1 G: 1 INJECTION, POWDER, FOR SOLUTION INTRAMUSCULAR; INTRAVENOUS at 04:02

## 2024-02-17 RX ADMIN — LORAZEPAM 0.5 MG: 2 INJECTION INTRAMUSCULAR; INTRAVENOUS at 10:02

## 2024-02-17 RX ADMIN — IPRATROPIUM BROMIDE AND ALBUTEROL SULFATE 3 ML: 2.5; .5 SOLUTION RESPIRATORY (INHALATION) at 01:02

## 2024-02-17 RX ADMIN — HYDRALAZINE HYDROCHLORIDE 10 MG: 20 INJECTION INTRAMUSCULAR; INTRAVENOUS at 12:02

## 2024-02-17 RX ADMIN — ARFORMOTEROL TARTRATE 15 MCG: 15 SOLUTION RESPIRATORY (INHALATION) at 07:02

## 2024-02-17 RX ADMIN — KETOROLAC TROMETHAMINE 15 MG: 30 INJECTION, SOLUTION INTRAMUSCULAR; INTRAVENOUS at 04:02

## 2024-02-17 RX ADMIN — METHYLPREDNISOLONE SODIUM SUCCINATE 60 MG: 40 INJECTION, POWDER, FOR SOLUTION INTRAMUSCULAR; INTRAVENOUS at 08:02

## 2024-02-17 RX ADMIN — LORAZEPAM 0.5 MG: 2 INJECTION INTRAMUSCULAR; INTRAVENOUS at 01:02

## 2024-02-17 RX ADMIN — ENOXAPARIN SODIUM 30 MG: 30 INJECTION SUBCUTANEOUS at 04:02

## 2024-02-17 RX ADMIN — BUDESONIDE 0.25 MG: 0.5 INHALANT RESPIRATORY (INHALATION) at 07:02

## 2024-02-17 RX ADMIN — HYDRALAZINE HYDROCHLORIDE 10 MG: 20 INJECTION INTRAMUSCULAR; INTRAVENOUS at 08:02

## 2024-02-17 RX ADMIN — FUROSEMIDE 40 MG: 10 INJECTION, SOLUTION INTRAVENOUS at 08:02

## 2024-02-17 RX ADMIN — IPRATROPIUM BROMIDE AND ALBUTEROL SULFATE 3 ML: 2.5; .5 SOLUTION RESPIRATORY (INHALATION) at 07:02

## 2024-02-17 RX ADMIN — FUROSEMIDE 40 MG: 10 INJECTION, SOLUTION INTRAMUSCULAR; INTRAVENOUS at 08:02

## 2024-02-17 RX ADMIN — FUROSEMIDE 20 MG: 10 INJECTION, SOLUTION INTRAMUSCULAR; INTRAVENOUS at 03:02

## 2024-02-17 RX ADMIN — LORAZEPAM 0.5 MG: 2 INJECTION INTRAMUSCULAR; INTRAVENOUS at 06:02

## 2024-02-17 NOTE — PT/OT/SLP PROGRESS
Physical Therapy      Patient Name:  Marietta Gates   MRN:  [id number]    Patient not seen today secondary to Nurse/ NALLELY hold. Will follow-up 2/19/24.

## 2024-02-17 NOTE — NURSING
Report received from Liz CAMPBELL. Patient settled into room 252. Multiple family members at bedside. Sitter at bedside. Patient is not awake at this time. Patient with O2 sat of 100% on bipap. FIO2 at 60%. Decreased to 50%. Patient still with O2 sat 100%. Will re-evaluate shortly.

## 2024-02-17 NOTE — ASSESSMENT & PLAN NOTE
Patient has hypokalemia which is Acute and currently controlled. Most recent potassium levels reviewed-   Lab Results   Component Value Date    K 4.0 02/17/2024   . Will continue potassium replacement per protocol and recheck repeat levels after replacement completed.

## 2024-02-17 NOTE — NURSING
Notified Dr. Pandya of pt's current vitals.Pt's respirations is 24. /69. heart rate 106. O2 sats 88 on 15L.

## 2024-02-17 NOTE — ACP (ADVANCE CARE PLANNING)
Advance Care Planning     Date: 02/17/2024    Today a voluntary meeting took place: bedside    Patient Participation: Patient is unable to participate     Attendees (Name and  Relationship to patient): Health care power of : Nicolas Mccarthy    Staff attendees (Name and  Role): Dr. Campbell Hospitalist     ACP Conversation (General): Understanding of advance care planning and role of health care agent defined , patient goals of care and likelihood of decompensation.     Code Status: DNR; status confirmed/order placed in chart     ACP Documents: None    Goals of care: The family and healthcare power of   endorses that what is most important right now is to focus on symptom/pain control, quality of life, even if it means sacrificing a little time, and improvement in condition but with limits to invasive therapies    Accordingly, we have decided that the best plan to meet the patient's goals includes continuing with treatment and if patient doesn't improve in 24hrs will pursue comfort measures.       Recommendations/  Follow-up tasks: Follow up family meeting planned: 2/18/24       Length of ACP   conversation in minutes: 20 minutes

## 2024-02-17 NOTE — PLAN OF CARE
Problem: Adult Inpatient Plan of Care  Goal: Plan of Care Review  Outcome: Ongoing, Not Progressing  Goal: Patient-Specific Goal (Individualized)  Outcome: Ongoing, Not Progressing  Goal: Absence of Hospital-Acquired Illness or Injury  Outcome: Ongoing, Not Progressing  Goal: Optimal Comfort and Wellbeing  Outcome: Ongoing, Not Progressing  Goal: Readiness for Transition of Care  Outcome: Ongoing, Not Progressing     Problem: Infection  Goal: Absence of Infection Signs and Symptoms  Outcome: Ongoing, Not Progressing     Problem: Skin Injury Risk Increased  Goal: Skin Health and Integrity  Outcome: Ongoing, Not Progressing     Problem: Fall Injury Risk  Goal: Absence of Fall and Fall-Related Injury  Outcome: Ongoing, Not Progressing     Problem: Gas Exchange Impaired  Goal: Optimal Gas Exchange  Outcome: Ongoing, Not Progressing

## 2024-02-17 NOTE — SUBJECTIVE & OBJECTIVE
Interval History: Patient seen and examined.  Oxygenation demand increased overnight, now on 15 L.  CXR with pulmonary edema.  ABG shows hypoxemia.  Given IV lasix 20 mg overnight.  Will give additional 40mg IV lasix now, also give 60mg IV solumedrol given COPD history.  Nebs ordered.  Patient alert, disoriented.  Sitter at bedside as she continues to pull off her oxygen mask.  Orders to transfer to Stepdown unit for Bipap placed.  Patient's daughter updated via phone.  Confirmed DNR status.        Review of Systems   Constitutional:  Negative for fatigue.   Respiratory:  Positive for shortness of breath.    Cardiovascular:  Negative for chest pain.   Gastrointestinal:  Negative for abdominal pain, nausea and vomiting.     Objective:     Vital Signs (Most Recent):  Temp: 98.2 °F (36.8 °C) (02/17/24 0725)  Pulse: 91 (02/17/24 0737)  Resp: (!) 22 (02/17/24 0737)  BP: (!) 161/69 (notified nurse kevin) (02/17/24 0725)  SpO2: (!) 88 % (02/17/24 0737) Vital Signs (24h Range):  Temp:  [97.6 °F (36.4 °C)-99.6 °F (37.6 °C)] 98.2 °F (36.8 °C)  Pulse:  [71-94] 91  Resp:  [16-24] 22  SpO2:  [82 %-98 %] 88 %  BP: (142-177)/(65-86) 161/69     Weight: 54.4 kg (120 lb)  Body mass index is 21.26 kg/m².    Intake/Output Summary (Last 24 hours) at 2/17/2024 0824  Last data filed at 2/17/2024 0535  Gross per 24 hour   Intake 1155 ml   Output 850 ml   Net 305 ml         Physical Exam  Vitals and nursing note reviewed.   HENT:      Mouth/Throat:      Mouth: Mucous membranes are moist.   Cardiovascular:      Rate and Rhythm: Normal rate and regular rhythm.      Heart sounds: Normal heart sounds.   Pulmonary:      Breath sounds: Wheezing and rhonchi present.   Abdominal:      Palpations: Abdomen is soft.   Musculoskeletal:         General: Tenderness present.   Skin:     General: Skin is warm and dry.   Neurological:      Mental Status: She is alert. She is disoriented.   Psychiatric:         Mood and Affect: Mood normal.              Significant Labs: All pertinent labs within the past 24 hours have been reviewed.  ABGs:   Recent Labs   Lab 02/17/24 0303   PH 7.448   PCO2 30.5*   HCO3 21.1*   POCSATURATED 86   BE -3*   PO2 49*     CBC:   Recent Labs   Lab 02/16/24  0541 02/17/24 0303   WBC 10.01  --    HGB 11.7*  --    HCT 36.5* 41     --      CMP:   Recent Labs   Lab 02/16/24 0541   *   K 4.2      CO2 26   *   BUN 20   CREATININE 1.0   CALCIUM 8.2*   PROT 5.8*   ALBUMIN 3.1*   BILITOT 0.7   ALKPHOS 50*   AST 14   ALT 9*   ANIONGAP 5*       Significant Imaging: I have reviewed all pertinent imaging results/findings within the past 24 hours.

## 2024-02-17 NOTE — NURSING
Nurses Note -- 4 Eyes      2/16/2024   7:13 PM      Skin assessed during: Q Shift Change      [x] No Altered Skin Integrity Present    []Prevention Measures Documented      [] Yes- Altered Skin Integrity Present or Discovered   [] LDA Added if Not in Epic (Describe Wound)   [] New Altered Skin Integrity was Present on Admit and Documented in LDA   [] Wound Image Taken    Wound Care Consulted? No    Attending Nurse:  Favian Barcenas RN/Staff Member:  Angela

## 2024-02-17 NOTE — NURSING
Pt's blood pressure increased to 198/91. BOSSMAN Tobin notified. New order for hydralyzine IV prn given and administered as per order.

## 2024-02-17 NOTE — NURSING
ABG completed per RRT, p02 29. Dr Horne notified and that her sp02 was 89% on 11L with the oxymask. MD to come evaluate the patient, RRT recommended vapotherm. MD notified and informed that an order has to be placed

## 2024-02-17 NOTE — NURSING
Patient desat to mid 80s Sp02 increased to 12L sat at 89%. Patient increased again to 13L while asleep her sp02 was 86%.Patient repositioned increased to 89%

## 2024-02-17 NOTE — RESPIRATORY THERAPY
02/16/24 2013   Patient Assessment/Suction   Level of Consciousness (AVPU) alert   Respiratory Effort Normal;Unlabored   Expansion/Accessory Muscles/Retractions no retractions;no use of accessory muscles   All Lung Fields Breath Sounds diminished;wheezes, expiratory   Skin Integrity   $ Wound Care Tech Time 15 min   Area Observed Bridge of nose   Skin Appearance without discoloration   PRE-TX-O2   Device (Oxygen Therapy) Oxymask   $ Is the patient on Low Flow Oxygen? Yes   Flow (L/min) 77   SpO2 95 %   Pulse Oximetry Type Intermittent   $ Pulse Oximetry - Multiple Charge Pulse Oximetry - Multiple   Pulse 79   Resp 18   Aerosol Therapy   $ Aerosol Therapy Charges Aerosol Treatment   Daily Review of Necessity (SVN) completed   Respiratory Treatment Status (SVN) given   Treatment Route (SVN) mask;oxygen   Patient Position (SVN) HOB elevated   Post Treatment Assessment (SVN) increased aeration   Signs of Intolerance (SVN) none   Breath Sounds Post-Respiratory Treatment   Throughout All Fields Post-Treatment All Fields   Throughout All Fields Post-Treatment aeration increased   Post-treatment Heart Rate (beats/min) 80   Post-treatment Resp Rate (breaths/min) 18   Education   $ Education Bronchodilator;15 min   Respiratory Evaluation   $ Care Plan Tech Time 15 min   $ Eval/Re-eval Charges Evaluation   Admitting Diagnosis Hip fx   Pulmonary Diagnosis COPD   Oxygen Care Plan   Oxygen Care Plan Per Protocol   SPO2 Goal (%) 92% non-cardiac   Rationale SpO2 is <92% (Non-cardiac Pt.)   Bronchodilator Care Plan   Bronchodilator Care Plan Aerosol   Aerosol Meds w/ frequency Albuterol - Ipratropium (DUO-NEB) 0.5mg-3mg(2.5ml) 3ml Nebulizer Solution2.5mg Q 6Hr;Pulmicort(Budenoside) 0.5mg BID;Other  (Brovana bid)

## 2024-02-17 NOTE — NURSING
..Nurses Note -- 4 Eyes      2/17/2024   4:00 PM      Skin assessed during: Transfer      [x] No Altered Skin Integrity Present    []Prevention Measures Documented      [] Yes- Altered Skin Integrity Present or Discovered   [] LDA Added if Not in Epic (Describe Wound)   [] New Altered Skin Integrity was Present on Admit and Documented in LDA   [] Wound Image Taken    Wound Care Consulted? No    Attending Nurse:  Shelley Gimenez RN    Second RN/Staff Member:   Brandy Potts RN

## 2024-02-17 NOTE — PLAN OF CARE
Overnight Patient had increasing oxygen requirements. An ABG and CXR determined this to be secondary to Pulm Edema, due to Patient being Lasix-Naive a 20mg IV Lasix was ordered. To note, sometimes Patient's oxygenation improves while on the Oxy-Mask but Patient pulls it off. Sitter ordered

## 2024-02-17 NOTE — NURSING
Notified MD patient on 15L sp02 sat at 87-88% patient repositioned, pulse ox moved to her toe, lasix ordered had previously been administered. Attempted nasal cannula but the patient was pulling it off and her 02 was at 84%. RN returned oxymask still at 15L. Sitter ordered by Dr Horne, there is none available per Shan the house supervisor informed her that there will be a sitter for day shift. Patient is constantly removing her mask. Multiple staff members have been readjusting and checking on the patient.

## 2024-02-17 NOTE — ASSESSMENT & PLAN NOTE
ABG 2/17:  pH 7.448, PCO2 30.5, PO2 49, HCO3 21.1, sat 86 on 10L nasal cannula  CXR with left lower lobe atelectasis and left pleural effusion   IV lasix started   IV solumedrol started given COPD hx   Nebs scheduled and PRN   BiPAP ordered, wean as tolerated

## 2024-02-17 NOTE — NURSING
Report called to Step-down nurse SHANNAN Rosa. Pt transported via bed with monitor and bipap in place.

## 2024-02-17 NOTE — NURSING
Dr Horne notified patient is more confused and no longer responding to questions to assess orientation. ABG and CXR ordered

## 2024-02-17 NOTE — NURSING TRANSFER
Nursing Transfer Note      2/17/2024   3:19 PM    Nurse giving handoff: SHANNAN Hill  Nurse receiving handoff: SHANNAN Rosa    Reason patient is being transferred: upgrade level of care    Transfer To: 2528    Transfer via bed    Transfer with continuous BIPAP    Transported by AMY Jones, Raymundo, RRT & Melvina, PCT      Order for Tele Monitor? Yes    Additional Lines: Oxygen    Patient belongings transferred with patient: Yes    Chart send with patient: Yes    Notified: family at the bedside.    Safety measures in place.

## 2024-02-17 NOTE — PROGRESS NOTES
[facility]  Progress Note    Patient Name: Marietta Gates  MRN: [id number]  Patient Class: IP- Inpatient   Admission Date: 2/14/2024  Length of Stay: 2 days  Attending Physician: Rad Pandya MD  Primary Care Provider: Ollie Bartlett MD        Subjective:     Principal Problem:Closed fracture of left hip        HPI:  89 WF with COPD hx not on oxygen , still smokes ,  has dementia ,  HTN .     Smoker  Lives at home with daughter  Gets around on her own with cane or walker     She was in usual state of health and tripped over a bucket at home and fell on her left side.    She was then having a lot of pain the daughter said and could not bear weight on the left leg.      So they brought her in to our ER.       Here she was found to have Acute fractures of the left superior and inferior pelvic rami     They spoke to orthopedics who said it was non operative and will need PT and conservative mgt    Plan was to admit her and control pain and get PT eval and then maybe placement     Daughter was in agreement with all of this     They did confirm that she is DNR       Patient was unable to give me any history     She got small morphine IV dose in ER and then became very unresponsive and bradycardic   So then she got some narcan doses   They thought she had some deviation of eyes so they sent her for second CT of her head.  The first CT head on arrival was normal.        Overview/Hospital Course:  Patient admitted to Med/Surg.  Orthopedic Surgery consulted for acute fractures of the left superior and inferior pubic rami.  Non-operative, conservative management per Ortho, patient can weight bear as tolerated.  PT/OT consulted.  Patient with a negative reaction to Morphine requiring narcan administration.  Pain controlled with Tylenol.  Patient had increased oxygen requirements escalating to 15 L/min.  CXR with pulmonary edema.  IV lasix started.  IV solumedrol also started.  ABG  with PH 7.448, PCO2 30.5, PO2 49, HCO3 21.1.  Patient placed on Bipap and orders placed to transfer to Stepdown 2/17.  Discussed with patient's family, confirmed DNR status.  Family considering transition to comfort care only.     Interval History: Patient seen and examined.  Oxygenation demand increased overnight, now on 15 L.  CXR with pulmonary edema.  ABG shows hypoxemia.  Given IV lasix 20 mg overnight.  Will give additional 40mg IV lasix now, also give 60mg IV solumedrol given COPD history.  Nebs ordered.  Patient alert, disoriented.  Sitter at bedside as she continues to pull off her oxygen mask.  Orders to transfer to Stepdown unit for Bipap placed.  Patient's daughter updated via phone.  Confirmed DNR status.        Review of Systems   Constitutional:  Negative for fatigue.   Respiratory:  Positive for shortness of breath.    Cardiovascular:  Negative for chest pain.   Gastrointestinal:  Negative for abdominal pain, nausea and vomiting.     Objective:     Vital Signs (Most Recent):  Temp: 98.2 °F (36.8 °C) (02/17/24 0725)  Pulse: 91 (02/17/24 0737)  Resp: (!) 22 (02/17/24 0737)  BP: (!) 161/69 (notified nurse kevin) (02/17/24 0725)  SpO2: (!) 88 % (02/17/24 0737) Vital Signs (24h Range):  Temp:  [97.6 °F (36.4 °C)-99.6 °F (37.6 °C)] 98.2 °F (36.8 °C)  Pulse:  [71-94] 91  Resp:  [16-24] 22  SpO2:  [82 %-98 %] 88 %  BP: (142-177)/(65-86) 161/69     Weight: 54.4 kg (120 lb)  Body mass index is 21.26 kg/m².    Intake/Output Summary (Last 24 hours) at 2/17/2024 0824  Last data filed at 2/17/2024 0535  Gross per 24 hour   Intake 1155 ml   Output 850 ml   Net 305 ml         Physical Exam  Vitals and nursing note reviewed.   HENT:      Mouth/Throat:      Mouth: Mucous membranes are moist.   Cardiovascular:      Rate and Rhythm: Normal rate and regular rhythm.      Heart sounds: Normal heart sounds.   Pulmonary:      Breath sounds: Wheezing and rhonchi present.   Abdominal:      Palpations: Abdomen is soft.    Musculoskeletal:         General: Tenderness present.   Skin:     General: Skin is warm and dry.   Neurological:      Mental Status: She is alert. She is disoriented.   Psychiatric:         Mood and Affect: Mood normal.             Significant Labs: All pertinent labs within the past 24 hours have been reviewed.  ABGs:   Recent Labs   Lab 02/17/24  0303   PH 7.448   PCO2 30.5*   HCO3 21.1*   POCSATURATED 86   BE -3*   PO2 49*     CBC:   Recent Labs   Lab 02/16/24  0541 02/17/24  0303   WBC 10.01  --    HGB 11.7*  --    HCT 36.5* 41     --      CMP:   Recent Labs   Lab 02/16/24  0541   *   K 4.2      CO2 26   *   BUN 20   CREATININE 1.0   CALCIUM 8.2*   PROT 5.8*   ALBUMIN 3.1*   BILITOT 0.7   ALKPHOS 50*   AST 14   ALT 9*   ANIONGAP 5*       Significant Imaging: I have reviewed all pertinent imaging results/findings within the past 24 hours.    Assessment/Plan:      * Closed fracture of left hip  S/P mechanical fall   Acute fractures of the left superior and inferior pelvic rami   Orthopedic Surgery consulted  Non-operative, conservative management   PT/OT consulted   Tylenol for pain control.  Patient had drop in O2 sat with change in mental status after being given morphine in the ER.  Avoid Narcotics.         Advanced care planning/counseling discussion  Advance Care Planning    Date: 02/17/2024    [facility]  I engaged the family in a voluntary conversation about advance care planning and we specifically addressed what the goals of care would be moving forward, in light of the patient's change in clinical status, specifically patient's worsening respiratory status.  We did specifically address the patient's likely prognosis, which is poor.  We explored the patient's values and preferences for future care.  The family endorses that what is most important right now is to focus on improvement in condition but with limits to invasive therapies and comfort and QOL     Accordingly, we have decided  that the best plan to meet the patient's goals includes continuing with treatment and no further escalation in treatment.      I did explain the role for hospice care at this stage of the patient's illness, including its ability to help the patient live with the best quality of life possible.  We will not be making a hospice referral at this time, but may consult hospice soon if patient continues to deteriorate.  Patient's family is waiting for another sibling to arrive to the hospital prior to making final decision on transitioning to comfort measures/hospice.     I spent a total of 25 minutes engaging the patient in this advance care planning discussion.              Hypoxemia  ABG 2/17:  pH 7.448, PCO2 30.5, PO2 49, HCO3 21.1, sat 86 on 10L nasal cannula  CXR with left lower lobe atelectasis and left pleural effusion   IV lasix started   IV solumedrol started given COPD hx   Nebs scheduled and PRN   BiPAP ordered, wean as tolerated         Multiple stable closed lateral compression fractures of pelvis  Ortho consulted   PT/OT      Hypokalemia  Patient has hypokalemia which is Acute and currently controlled. Most recent potassium levels reviewed-   Lab Results   Component Value Date    K 4.0 02/17/2024   . Will continue potassium replacement per protocol and recheck repeat levels after replacement completed.     Encephalopathy, toxic  From 2 mg IV morphine  Got IV narcan twice   Head CT repeated did not show anything new or acute findings   Mentation improved following Narcan.   Avoid narcotics         VTE Risk Mitigation (From admission, onward)           Ordered     enoxaparin injection 30 mg  Daily        Note to Pharmacy: Ht:    Wt: 49.9 kg (110 lb)  Estimated Creatinine Clearance: 23.6 mL/min (based on SCr of 1.2 mg/dL).  Body mass index is 18.88 kg/m².    02/15/24 0054     IP VTE HIGH RISK PATIENT  Once         02/15/24 0052     Place sequential compression device  Until discontinued         02/15/24 0052                     Discharge Planning   KENNY: 2/20/2024     Code Status: DNR   Is the patient medically ready for discharge?:     Reason for patient still in hospital (select all that apply): Treatment  Discharge Plan A: Skilled Nursing Facility   Discharge Delays: None known at this time              Crista Wilcox NP  Department of Hospital Medicine   [facility] hospital

## 2024-02-17 NOTE — ASSESSMENT & PLAN NOTE
Advance Care Planning     Date: 02/17/2024    [facility]  I engaged the family in a voluntary conversation about advance care planning and we specifically addressed what the goals of care would be moving forward, in light of the patient's change in clinical status, specifically patient's worsening respiratory status.  We did specifically address the patient's likely prognosis, which is poor.  We explored the patient's values and preferences for future care.  The family endorses that what is most important right now is to focus on improvement in condition but with limits to invasive therapies and comfort and QOL     Accordingly, we have decided that the best plan to meet the patient's goals includes continuing with treatment and no further escalation in treatment.      I did explain the role for hospice care at this stage of the patient's illness, including its ability to help the patient live with the best quality of life possible.  We will not be making a hospice referral at this time, but may consult hospice soon if patient continues to deteriorate.  Patient's family is waiting for another sibling to arrive to the hospital prior to making final decision on transitioning to comfort measures/hospice.     I spent a total of 25 minutes engaging the patient in this advance care planning discussion.

## 2024-02-17 NOTE — PLAN OF CARE
Problem: Adult Inpatient Plan of Care  Goal: Absence of Hospital-Acquired Illness or Injury  Outcome: Ongoing, Progressing     Problem: Fall Injury Risk  Goal: Absence of Fall and Fall-Related Injury  Outcome: Ongoing, Progressing     Problem: Gas Exchange Impaired  Goal: Optimal Gas Exchange  Outcome: Ongoing, Not Progressing     Problem: Adult Inpatient Plan of Care  Goal: Optimal Comfort and Wellbeing  Outcome: Adequate for Care Transition

## 2024-02-18 LAB
ALBUMIN SERPL BCP-MCNC: 3.3 G/DL (ref 3.5–5.2)
ALP SERPL-CCNC: 51 U/L (ref 55–135)
ALT SERPL W/O P-5'-P-CCNC: 6 U/L (ref 10–44)
ANION GAP SERPL CALC-SCNC: 12 MMOL/L (ref 8–16)
AST SERPL-CCNC: 13 U/L (ref 10–40)
BASOPHILS # BLD AUTO: 0.03 K/UL (ref 0–0.2)
BASOPHILS NFR BLD: 0.2 % (ref 0–1.9)
BILIRUB SERPL-MCNC: 0.6 MG/DL (ref 0.1–1)
BILIRUB UR QL STRIP: NEGATIVE
BUN SERPL-MCNC: 32 MG/DL (ref 10–30)
CALCIUM SERPL-MCNC: 8.7 MG/DL (ref 8.7–10.5)
CHLORIDE SERPL-SCNC: 99 MMOL/L (ref 95–110)
CLARITY UR: CLEAR
CO2 SERPL-SCNC: 25 MMOL/L (ref 23–29)
COLOR UR: YELLOW
CREAT SERPL-MCNC: 1.1 MG/DL (ref 0.5–1.4)
DIFFERENTIAL METHOD BLD: ABNORMAL
EOSINOPHIL # BLD AUTO: 0 K/UL (ref 0–0.5)
EOSINOPHIL NFR BLD: 0.1 % (ref 0–8)
ERYTHROCYTE [DISTWIDTH] IN BLOOD BY AUTOMATED COUNT: 13.3 % (ref 11.5–14.5)
EST. GFR  (NO RACE VARIABLE): 47.1 ML/MIN/1.73 M^2
GLUCOSE SERPL-MCNC: 158 MG/DL (ref 70–110)
GLUCOSE UR QL STRIP: NEGATIVE
HCT VFR BLD AUTO: 38.4 % (ref 37–48.5)
HGB BLD-MCNC: 12.6 G/DL (ref 12–16)
HGB UR QL STRIP: NEGATIVE
IMM GRANULOCYTES # BLD AUTO: 0.11 K/UL (ref 0–0.04)
IMM GRANULOCYTES NFR BLD AUTO: 0.6 % (ref 0–0.5)
KETONES UR QL STRIP: NEGATIVE
LEUKOCYTE ESTERASE UR QL STRIP: NEGATIVE
LYMPHOCYTES # BLD AUTO: 0.3 K/UL (ref 1–4.8)
LYMPHOCYTES NFR BLD: 1.8 % (ref 18–48)
MAGNESIUM SERPL-MCNC: 1.9 MG/DL (ref 1.6–2.6)
MCH RBC QN AUTO: 31 PG (ref 27–31)
MCHC RBC AUTO-ENTMCNC: 32.8 G/DL (ref 32–36)
MCV RBC AUTO: 95 FL (ref 82–98)
MONOCYTES # BLD AUTO: 0.4 K/UL (ref 0.3–1)
MONOCYTES NFR BLD: 2.3 % (ref 4–15)
NEUTROPHILS # BLD AUTO: 16.2 K/UL (ref 1.8–7.7)
NEUTROPHILS NFR BLD: 95 % (ref 38–73)
NITRITE UR QL STRIP: NEGATIVE
NRBC BLD-RTO: 0 /100 WBC
OHS QRS DURATION: 60 MS
OHS QTC CALCULATION: 446 MS
PH UR STRIP: 6 [PH] (ref 5–8)
PLATELET # BLD AUTO: 246 K/UL (ref 150–450)
PMV BLD AUTO: 10.8 FL (ref 9.2–12.9)
POTASSIUM SERPL-SCNC: 3.6 MMOL/L (ref 3.5–5.1)
PROT SERPL-MCNC: 6.6 G/DL (ref 6–8.4)
PROT UR QL STRIP: ABNORMAL
RBC # BLD AUTO: 4.06 M/UL (ref 4–5.4)
SODIUM SERPL-SCNC: 136 MMOL/L (ref 136–145)
SP GR UR STRIP: 1.01 (ref 1–1.03)
URN SPEC COLLECT METH UR: ABNORMAL
UROBILINOGEN UR STRIP-ACNC: NEGATIVE EU/DL
WBC # BLD AUTO: 17.09 K/UL (ref 3.9–12.7)

## 2024-02-18 PROCEDURE — 83735 ASSAY OF MAGNESIUM: CPT | Performed by: NURSE PRACTITIONER

## 2024-02-18 PROCEDURE — 27000221 HC OXYGEN, UP TO 24 HOURS

## 2024-02-18 PROCEDURE — 63600175 PHARM REV CODE 636 W HCPCS: Performed by: NURSE PRACTITIONER

## 2024-02-18 PROCEDURE — 81003 URINALYSIS AUTO W/O SCOPE: CPT | Performed by: STUDENT IN AN ORGANIZED HEALTH CARE EDUCATION/TRAINING PROGRAM

## 2024-02-18 PROCEDURE — 94761 N-INVAS EAR/PLS OXIMETRY MLT: CPT

## 2024-02-18 PROCEDURE — 63600175 PHARM REV CODE 636 W HCPCS: Performed by: INTERNAL MEDICINE

## 2024-02-18 PROCEDURE — 99900035 HC TECH TIME PER 15 MIN (STAT)

## 2024-02-18 PROCEDURE — 99900031 HC PATIENT EDUCATION (STAT)

## 2024-02-18 PROCEDURE — 94640 AIRWAY INHALATION TREATMENT: CPT

## 2024-02-18 PROCEDURE — 25000242 PHARM REV CODE 250 ALT 637 W/ HCPCS: Performed by: NURSE PRACTITIONER

## 2024-02-18 PROCEDURE — 25000003 PHARM REV CODE 250: Performed by: NURSE PRACTITIONER

## 2024-02-18 PROCEDURE — 36415 COLL VENOUS BLD VENIPUNCTURE: CPT | Performed by: NURSE PRACTITIONER

## 2024-02-18 PROCEDURE — 21000000 HC CCU ICU ROOM CHARGE

## 2024-02-18 PROCEDURE — 94660 CPAP INITIATION&MGMT: CPT

## 2024-02-18 PROCEDURE — 80053 COMPREHEN METABOLIC PANEL: CPT | Performed by: NURSE PRACTITIONER

## 2024-02-18 PROCEDURE — 63600175 PHARM REV CODE 636 W HCPCS: Performed by: STUDENT IN AN ORGANIZED HEALTH CARE EDUCATION/TRAINING PROGRAM

## 2024-02-18 PROCEDURE — 85025 COMPLETE CBC W/AUTO DIFF WBC: CPT | Performed by: NURSE PRACTITIONER

## 2024-02-18 RX ORDER — FUROSEMIDE 10 MG/ML
20 INJECTION INTRAMUSCULAR; INTRAVENOUS DAILY
Status: DISCONTINUED | OUTPATIENT
Start: 2024-02-19 | End: 2024-02-19

## 2024-02-18 RX ORDER — AMLODIPINE BESYLATE 5 MG/1
5 TABLET ORAL DAILY
Status: DISCONTINUED | OUTPATIENT
Start: 2024-02-18 | End: 2024-02-21 | Stop reason: HOSPADM

## 2024-02-18 RX ORDER — FUROSEMIDE 10 MG/ML
40 INJECTION INTRAMUSCULAR; INTRAVENOUS DAILY
Status: DISCONTINUED | OUTPATIENT
Start: 2024-02-18 | End: 2024-02-18

## 2024-02-18 RX ADMIN — CEFTRIAXONE SODIUM 1 G: 1 INJECTION, POWDER, FOR SOLUTION INTRAMUSCULAR; INTRAVENOUS at 03:02

## 2024-02-18 RX ADMIN — ARFORMOTEROL TARTRATE 15 MCG: 15 SOLUTION RESPIRATORY (INHALATION) at 07:02

## 2024-02-18 RX ADMIN — METHYLPREDNISOLONE SODIUM SUCCINATE 40 MG: 40 INJECTION, POWDER, FOR SOLUTION INTRAMUSCULAR; INTRAVENOUS at 09:02

## 2024-02-18 RX ADMIN — METHYLPREDNISOLONE SODIUM SUCCINATE 60 MG: 40 INJECTION, POWDER, FOR SOLUTION INTRAMUSCULAR; INTRAVENOUS at 09:02

## 2024-02-18 RX ADMIN — HYDRALAZINE HYDROCHLORIDE 10 MG: 20 INJECTION INTRAMUSCULAR; INTRAVENOUS at 11:02

## 2024-02-18 RX ADMIN — ENOXAPARIN SODIUM 30 MG: 30 INJECTION SUBCUTANEOUS at 05:02

## 2024-02-18 RX ADMIN — BUDESONIDE 0.25 MG: 0.5 INHALANT RESPIRATORY (INHALATION) at 07:02

## 2024-02-18 RX ADMIN — FUROSEMIDE 40 MG: 10 INJECTION, SOLUTION INTRAMUSCULAR; INTRAVENOUS at 09:02

## 2024-02-18 NOTE — PROGRESS NOTES
Critical access hospital Medicine  Progress Note    Patient Name: Marietta Gates  MRN: [id number]  Patient Class: IP- Inpatient   Admission Date: 2/14/2024  Length of Stay: 3 days  Attending Physician: Estuardo Campbell MD  Primary Care Provider: Ollie Bartlett MD        Subjective:     Principal Problem:Closed fracture of left hip        HPI:  89 WF with COPD hx not on oxygen , still smokes ,  has dementia ,  HTN .     Smoker  Lives at home with daughter  Gets around on her own with cane or walker     She was in usual state of health and tripped over a bucket at home and fell on her left side.    She was then having a lot of pain the daughter said and could not bear weight on the left leg.      So they brought her in to our ER.       Here she was found to have Acute fractures of the left superior and inferior pelvic rami     They spoke to orthopedics who said it was non operative and will need PT and conservative mgt    Plan was to admit her and control pain and get PT eval and then maybe placement     Daughter was in agreement with all of this     They did confirm that she is DNR       Patient was unable to give me any history     She got small morphine IV dose in ER and then became very unresponsive and bradycardic   So then she got some narcan doses   They thought she had some deviation of eyes so they sent her for second CT of her head.  The first CT head on arrival was normal.        Overview/Hospital Course:  Patient admitted to Med/Surg.  Orthopedic Surgery consulted for acute fractures of the left superior and inferior pubic rami.  Non-operative, conservative management per Ortho, patient can weight bear as tolerated.  PT/OT consulted.  Patient with a negative reaction to Morphine requiring narcan administration.  Pain controlled with Tylenol.  Patient had increased oxygen requirements escalating to 15 L/min.  CXR with pulmonary edema.  IV lasix started.  IV solumedrol also started.  ABG  with PH 7.448, PCO2 30.5, PO2 49, HCO3 21.1.  Patient placed on Bipap and orders placed to transfer to Stepdown 2/17.  Discussed with patient's family, confirmed DNR status.  Oxygenation improving, weaned off of bipap. CXR more consistent with COPD, doesn't seem overloaded. ST consulted. Plan for discharge to rehab if patient remains stable.      Interval History: Pt seen this morning, somnolent but arouses to verbal stimuli. WBC 17, could be from steroids. No pna on cxr. US negative for DVT.     Review of Systems   Constitutional:  Positive for fatigue.   Respiratory:  Negative for cough and shortness of breath.    Cardiovascular:  Negative for chest pain.     Objective:     Vital Signs (Most Recent):  Temp: 98.1 °F (36.7 °C) (02/18/24 0801)  Pulse: 89 (02/18/24 1008)  Resp: (!) 22 (02/18/24 1008)  BP: (!) 165/74 (02/18/24 0801)  SpO2: 96 % (02/18/24 1008) Vital Signs (24h Range):  Temp:  [97.6 °F (36.4 °C)-98.6 °F (37 °C)] 98.1 °F (36.7 °C)  Pulse:  [] 89  Resp:  [20-36] 22  SpO2:  [94 %-100 %] 96 %  BP: (103-197)/() 165/74     Weight: 54.4 kg (120 lb)  Body mass index is 21.26 kg/m².    Intake/Output Summary (Last 24 hours) at 2/18/2024 1055  Last data filed at 2/18/2024 0727  Gross per 24 hour   Intake 100 ml   Output 1500 ml   Net -1400 ml         Physical Exam  Vitals and nursing note reviewed.   Constitutional:       General: She is not in acute distress.  HENT:      Mouth/Throat:      Mouth: Mucous membranes are moist.   Cardiovascular:      Rate and Rhythm: Normal rate and regular rhythm.      Heart sounds: Normal heart sounds.   Pulmonary:      Breath sounds: Wheezing present.   Abdominal:      Palpations: Abdomen is soft.   Musculoskeletal:         General: Tenderness present.   Skin:     General: Skin is warm and dry.   Neurological:      Mental Status: She is alert. She is disoriented.   Psychiatric:         Mood and Affect: Mood normal.             Significant Labs: All pertinent labs  within the past 24 hours have been reviewed.  CBC:   Recent Labs   Lab 02/17/24  0817 02/17/24  0818 02/18/24  0335   WBC 15.38* 14.60* 17.09*   HGB 11.6* 11.8* 12.6   HCT 35.3* 35.3* 38.4    225 246     CMP:   Recent Labs   Lab 02/17/24  0817 02/17/24  0818 02/18/24  0335   * 133* 136   K 4.0 4.0 3.6    101 99   CO2 24 23 25   * 135* 158*   BUN 20 20 32*   CREATININE 1.0 1.1 1.1   CALCIUM 8.1* 8.1* 8.7   PROT  --  6.5 6.6   ALBUMIN  --  3.1* 3.3*   BILITOT  --  0.8 0.6   ALKPHOS  --  49* 51*   AST  --  12 13   ALT  --  7* 6*   ANIONGAP 8 9 12       Significant Imaging: I have reviewed all pertinent imaging results/findings within the past 24 hours.    Assessment/Plan:      * Closed fracture of left hip  S/P mechanical fall   Acute fractures of the left superior and inferior pelvic rami   Orthopedic Surgery consulted  Non-operative, conservative management   PT/OT consulted   Tylenol for pain control.  Patient had drop in O2 sat with change in mental status after being given morphine in the ER.  Avoid Narcotics.   Plan for discharge to rehab if patient remains stable         Advanced care planning/counseling discussion  Advance Care Planning    Date: 02/17/2024    [facility]  I engaged the family in a voluntary conversation about advance care planning and we specifically addressed what the goals of care would be moving forward, in light of the patient's change in clinical status, specifically patient's worsening respiratory status.  We did specifically address the patient's likely prognosis, which is poor.  We explored the patient's values and preferences for future care.  The family endorses that what is most important right now is to focus on improvement in condition but with limits to invasive therapies and comfort and QOL     Accordingly, we have decided that the best plan to meet the patient's goals includes continuing with treatment and no further escalation in treatment.      I did explain  the role for hospice care at this stage of the patient's illness, including its ability to help the patient live with the best quality of life possible.  We will not be making a hospice referral at this time, but may consult hospice soon if patient continues to deteriorate.      I spent a total of 25 minutes engaging the patient in this advance care planning discussion.              Hypoxemia  ABG 2/17:  pH 7.448, PCO2 30.5, PO2 49, HCO3 21.1, sat 86 on 10L nasal cannula  CXR with left lower lobe atelectasis and tiny left pleural effusion   IV lasix started   IV solumedrol started given COPD hx   Nebs scheduled and PRN     Bipap weaned off, questionable if is this is due to volume overload, patient appears dry and CXR not grossly overloaded. Will wean lasix and cont IV steroids         Multiple stable closed lateral compression fractures of pelvis  Ortho consulted   PT/OT      Hypokalemia  Patient has hypokalemia which is Acute and currently controlled. Most recent potassium levels reviewed-   Lab Results   Component Value Date    K 3.6 02/18/2024   . Will continue potassium replacement per protocol and recheck repeat levels after replacement completed.     Encephalopathy, toxic  From 2 mg IV morphine  Got IV narcan twice   Head CT repeated did not show anything new or acute findings   Mentation improved following Narcan.   Avoid narcotics   Follow up repeat UA        VTE Risk Mitigation (From admission, onward)           Ordered     enoxaparin injection 30 mg  Daily        Note to Pharmacy: Ht:    Wt: 49.9 kg (110 lb)  Estimated Creatinine Clearance: 23.6 mL/min (based on SCr of 1.2 mg/dL).  Body mass index is 18.88 kg/m².    02/15/24 0054     IP VTE HIGH RISK PATIENT  Once         02/15/24 0052     Place sequential compression device  Until discontinued         02/15/24 0052                    Discharge Planning   KENNY: 2/20/2024     Code Status: DNR   Is the patient medically ready for discharge?:     Reason for  patient still in hospital (select all that apply): Patient trending condition  Discharge Plan A: Skilled Nursing Facility   Discharge Delays: None known at this time              Estuardo Campbell MD  Department of Hospital Medicine   [facility]

## 2024-02-18 NOTE — ASSESSMENT & PLAN NOTE
Advance Care Planning     Date: 02/17/2024    [facility]  I engaged the family in a voluntary conversation about advance care planning and we specifically addressed what the goals of care would be moving forward, in light of the patient's change in clinical status, specifically patient's worsening respiratory status.  We did specifically address the patient's likely prognosis, which is poor.  We explored the patient's values and preferences for future care.  The family endorses that what is most important right now is to focus on improvement in condition but with limits to invasive therapies and comfort and QOL     Accordingly, we have decided that the best plan to meet the patient's goals includes continuing with treatment and no further escalation in treatment.      I did explain the role for hospice care at this stage of the patient's illness, including its ability to help the patient live with the best quality of life possible.  We will not be making a hospice referral at this time, but may consult hospice soon if patient continues to deteriorate.      I spent a total of 25 minutes engaging the patient in this advance care planning discussion.

## 2024-02-18 NOTE — ASSESSMENT & PLAN NOTE
From 2 mg IV morphine  Got IV narcan twice   Head CT repeated did not show anything new or acute findings   Mentation improved following Narcan.   Avoid narcotics   Follow up repeat UA

## 2024-02-18 NOTE — NURSING
..Nurses Note -- 4 Eyes      2/18/2024   12:16 PM      Skin assessed during: Q Shift Change      [x] No Altered Skin Integrity Present    []Prevention Measures Documented      [] Yes- Altered Skin Integrity Present or Discovered   [] LDA Added if Not in Epic (Describe Wound)   [] New Altered Skin Integrity was Present on Admit and Documented in LDA   [] Wound Image Taken    Wound Care Consulted? No    Attending Nurse:  Shelley Gimenez RN    Second RN/Staff Member:  Isela Hogan RN

## 2024-02-18 NOTE — NURSING
Nurses Note -- 4 Eyes      2/17/2024  2100 pm      Skin assessed during: Q Shift Change      [x] No Altered Skin Integrity Present    []Prevention Measures Documented      [] Yes- Altered Skin Integrity Present or Discovered   [] LDA Added if Not in Epic (Describe Wound)   [] New Altered Skin Integrity was Present on Admit and Documented in LDA   [] Wound Image Taken    Wound Care Consulted? No    Attending Nurse:  SHANNAN Osullivan    Second RN/Staff Member:   SHANNAN Barnes

## 2024-02-18 NOTE — ASSESSMENT & PLAN NOTE
ABG 2/17:  pH 7.448, PCO2 30.5, PO2 49, HCO3 21.1, sat 86 on 10L nasal cannula  CXR with left lower lobe atelectasis and tiny left pleural effusion   IV lasix started   IV solumedrol started given COPD hx   Nebs scheduled and PRN     Bipap weaned off, questionable if is this is due to volume overload, patient appears dry and CXR not grossly overloaded. Will wean lasix and cont IV steroids

## 2024-02-18 NOTE — ASSESSMENT & PLAN NOTE
S/P mechanical fall   Acute fractures of the left superior and inferior pelvic rami   Orthopedic Surgery consulted  Non-operative, conservative management   PT/OT consulted   Tylenol for pain control.  Patient had drop in O2 sat with change in mental status after being given morphine in the ER.  Avoid Narcotics.   Plan for discharge to rehab if patient remains stable

## 2024-02-18 NOTE — ASSESSMENT & PLAN NOTE
Patient has hypokalemia which is Acute and currently controlled. Most recent potassium levels reviewed-   Lab Results   Component Value Date    K 3.6 02/18/2024   . Will continue potassium replacement per protocol and recheck repeat levels after replacement completed.

## 2024-02-18 NOTE — SUBJECTIVE & OBJECTIVE
Interval History: Pt seen this morning, somnolent but arouses to verbal stimuli. WBC 17, could be from steroids. No pna on cxr. US negative for DVT.     Review of Systems   Constitutional:  Positive for fatigue.   Respiratory:  Negative for cough and shortness of breath.    Cardiovascular:  Negative for chest pain.     Objective:     Vital Signs (Most Recent):  Temp: 98.1 °F (36.7 °C) (02/18/24 0801)  Pulse: 89 (02/18/24 1008)  Resp: (!) 22 (02/18/24 1008)  BP: (!) 165/74 (02/18/24 0801)  SpO2: 96 % (02/18/24 1008) Vital Signs (24h Range):  Temp:  [97.6 °F (36.4 °C)-98.6 °F (37 °C)] 98.1 °F (36.7 °C)  Pulse:  [] 89  Resp:  [20-36] 22  SpO2:  [94 %-100 %] 96 %  BP: (103-197)/() 165/74     Weight: 54.4 kg (120 lb)  Body mass index is 21.26 kg/m².    Intake/Output Summary (Last 24 hours) at 2/18/2024 1055  Last data filed at 2/18/2024 0727  Gross per 24 hour   Intake 100 ml   Output 1500 ml   Net -1400 ml         Physical Exam  Vitals and nursing note reviewed.   Constitutional:       General: She is not in acute distress.  HENT:      Mouth/Throat:      Mouth: Mucous membranes are moist.   Cardiovascular:      Rate and Rhythm: Normal rate and regular rhythm.      Heart sounds: Normal heart sounds.   Pulmonary:      Breath sounds: Wheezing present.   Abdominal:      Palpations: Abdomen is soft.   Musculoskeletal:         General: Tenderness present.   Skin:     General: Skin is warm and dry.   Neurological:      Mental Status: She is alert. She is disoriented.   Psychiatric:         Mood and Affect: Mood normal.             Significant Labs: All pertinent labs within the past 24 hours have been reviewed.  CBC:   Recent Labs   Lab 02/17/24  0817 02/17/24  0818 02/18/24  0335   WBC 15.38* 14.60* 17.09*   HGB 11.6* 11.8* 12.6   HCT 35.3* 35.3* 38.4    225 246     CMP:   Recent Labs   Lab 02/17/24  0817 02/17/24  0818 02/18/24  0335   * 133* 136   K 4.0 4.0 3.6    101 99   CO2 24 23 25   GLU  133* 135* 158*   BUN 20 20 32*   CREATININE 1.0 1.1 1.1   CALCIUM 8.1* 8.1* 8.7   PROT  --  6.5 6.6   ALBUMIN  --  3.1* 3.3*   BILITOT  --  0.8 0.6   ALKPHOS  --  49* 51*   AST  --  12 13   ALT  --  7* 6*   ANIONGAP 8 9 12       Significant Imaging: I have reviewed all pertinent imaging results/findings within the past 24 hours.

## 2024-02-19 LAB
ALBUMIN SERPL BCP-MCNC: 3.5 G/DL (ref 3.5–5.2)
ALP SERPL-CCNC: 52 U/L (ref 55–135)
ALT SERPL W/O P-5'-P-CCNC: 9 U/L (ref 10–44)
ANION GAP SERPL CALC-SCNC: 15 MMOL/L (ref 8–16)
AST SERPL-CCNC: 16 U/L (ref 10–40)
BASOPHILS # BLD AUTO: 0.03 K/UL (ref 0–0.2)
BASOPHILS NFR BLD: 0.3 % (ref 0–1.9)
BILIRUB SERPL-MCNC: 0.6 MG/DL (ref 0.1–1)
BUN SERPL-MCNC: 55 MG/DL (ref 10–30)
CALCIUM SERPL-MCNC: 9 MG/DL (ref 8.7–10.5)
CHLORIDE SERPL-SCNC: 100 MMOL/L (ref 95–110)
CO2 SERPL-SCNC: 25 MMOL/L (ref 23–29)
CREAT SERPL-MCNC: 1 MG/DL (ref 0.5–1.4)
DIFFERENTIAL METHOD BLD: ABNORMAL
EOSINOPHIL # BLD AUTO: 0 K/UL (ref 0–0.5)
EOSINOPHIL NFR BLD: 0 % (ref 0–8)
ERYTHROCYTE [DISTWIDTH] IN BLOOD BY AUTOMATED COUNT: 13.4 % (ref 11.5–14.5)
EST. GFR  (NO RACE VARIABLE): 52.9 ML/MIN/1.73 M^2
GLUCOSE SERPL-MCNC: 171 MG/DL (ref 70–110)
HCT VFR BLD AUTO: 39.4 % (ref 37–48.5)
HGB BLD-MCNC: 12.9 G/DL (ref 12–16)
IMM GRANULOCYTES # BLD AUTO: 0.1 K/UL (ref 0–0.04)
IMM GRANULOCYTES NFR BLD AUTO: 0.9 % (ref 0–0.5)
LYMPHOCYTES # BLD AUTO: 0.4 K/UL (ref 1–4.8)
LYMPHOCYTES NFR BLD: 3.4 % (ref 18–48)
MAGNESIUM SERPL-MCNC: 2.3 MG/DL (ref 1.6–2.6)
MCH RBC QN AUTO: 31.2 PG (ref 27–31)
MCHC RBC AUTO-ENTMCNC: 32.7 G/DL (ref 32–36)
MCV RBC AUTO: 95 FL (ref 82–98)
MONOCYTES # BLD AUTO: 0.4 K/UL (ref 0.3–1)
MONOCYTES NFR BLD: 3.6 % (ref 4–15)
NEUTROPHILS # BLD AUTO: 10.4 K/UL (ref 1.8–7.7)
NEUTROPHILS NFR BLD: 91.8 % (ref 38–73)
NRBC BLD-RTO: 0 /100 WBC
PLATELET # BLD AUTO: 328 K/UL (ref 150–450)
PMV BLD AUTO: 11.1 FL (ref 9.2–12.9)
POTASSIUM SERPL-SCNC: 3.3 MMOL/L (ref 3.5–5.1)
POTASSIUM SERPL-SCNC: 3.9 MMOL/L (ref 3.5–5.1)
PROT SERPL-MCNC: 7.8 G/DL (ref 6–8.4)
RBC # BLD AUTO: 4.14 M/UL (ref 4–5.4)
SODIUM SERPL-SCNC: 140 MMOL/L (ref 136–145)
WBC # BLD AUTO: 11.26 K/UL (ref 3.9–12.7)

## 2024-02-19 PROCEDURE — 63600175 PHARM REV CODE 636 W HCPCS: Performed by: NURSE PRACTITIONER

## 2024-02-19 PROCEDURE — 92610 EVALUATE SWALLOWING FUNCTION: CPT

## 2024-02-19 PROCEDURE — 25000003 PHARM REV CODE 250: Performed by: NURSE PRACTITIONER

## 2024-02-19 PROCEDURE — 63600175 PHARM REV CODE 636 W HCPCS: Performed by: STUDENT IN AN ORGANIZED HEALTH CARE EDUCATION/TRAINING PROGRAM

## 2024-02-19 PROCEDURE — 36415 COLL VENOUS BLD VENIPUNCTURE: CPT | Performed by: STUDENT IN AN ORGANIZED HEALTH CARE EDUCATION/TRAINING PROGRAM

## 2024-02-19 PROCEDURE — 83735 ASSAY OF MAGNESIUM: CPT | Performed by: NURSE PRACTITIONER

## 2024-02-19 PROCEDURE — 25000003 PHARM REV CODE 250: Performed by: STUDENT IN AN ORGANIZED HEALTH CARE EDUCATION/TRAINING PROGRAM

## 2024-02-19 PROCEDURE — 80053 COMPREHEN METABOLIC PANEL: CPT | Performed by: NURSE PRACTITIONER

## 2024-02-19 PROCEDURE — 27100171 HC OXYGEN HIGH FLOW UP TO 24 HOURS

## 2024-02-19 PROCEDURE — 94640 AIRWAY INHALATION TREATMENT: CPT

## 2024-02-19 PROCEDURE — 97530 THERAPEUTIC ACTIVITIES: CPT

## 2024-02-19 PROCEDURE — 99900031 HC PATIENT EDUCATION (STAT)

## 2024-02-19 PROCEDURE — 99900035 HC TECH TIME PER 15 MIN (STAT)

## 2024-02-19 PROCEDURE — 94660 CPAP INITIATION&MGMT: CPT

## 2024-02-19 PROCEDURE — 27000221 HC OXYGEN, UP TO 24 HOURS

## 2024-02-19 PROCEDURE — 94761 N-INVAS EAR/PLS OXIMETRY MLT: CPT

## 2024-02-19 PROCEDURE — 21000000 HC CCU ICU ROOM CHARGE

## 2024-02-19 PROCEDURE — 36415 COLL VENOUS BLD VENIPUNCTURE: CPT | Performed by: NURSE PRACTITIONER

## 2024-02-19 PROCEDURE — 25000242 PHARM REV CODE 250 ALT 637 W/ HCPCS: Performed by: NURSE PRACTITIONER

## 2024-02-19 PROCEDURE — 92526 ORAL FUNCTION THERAPY: CPT

## 2024-02-19 PROCEDURE — 85025 COMPLETE CBC W/AUTO DIFF WBC: CPT | Performed by: NURSE PRACTITIONER

## 2024-02-19 PROCEDURE — 84132 ASSAY OF SERUM POTASSIUM: CPT | Performed by: STUDENT IN AN ORGANIZED HEALTH CARE EDUCATION/TRAINING PROGRAM

## 2024-02-19 PROCEDURE — 63700000 PHARM REV CODE 250 ALT 637 W/O HCPCS: Performed by: STUDENT IN AN ORGANIZED HEALTH CARE EDUCATION/TRAINING PROGRAM

## 2024-02-19 RX ORDER — PRAVASTATIN SODIUM 10 MG/1
10 TABLET ORAL NIGHTLY
Status: DISCONTINUED | OUTPATIENT
Start: 2024-02-19 | End: 2024-02-21 | Stop reason: HOSPADM

## 2024-02-19 RX ORDER — LOSARTAN POTASSIUM 50 MG/1
100 TABLET ORAL NIGHTLY
Status: DISCONTINUED | OUTPATIENT
Start: 2024-02-19 | End: 2024-02-21 | Stop reason: HOSPADM

## 2024-02-19 RX ORDER — KETOROLAC TROMETHAMINE 30 MG/ML
15 INJECTION, SOLUTION INTRAMUSCULAR; INTRAVENOUS ONCE
Status: COMPLETED | OUTPATIENT
Start: 2024-02-19 | End: 2024-02-19

## 2024-02-19 RX ORDER — AZITHROMYCIN 250 MG/1
250 TABLET, FILM COATED ORAL DAILY
Status: DISCONTINUED | OUTPATIENT
Start: 2024-02-19 | End: 2024-02-21 | Stop reason: HOSPADM

## 2024-02-19 RX ADMIN — POTASSIUM BICARBONATE 35 MEQ: 391 TABLET, EFFERVESCENT ORAL at 03:02

## 2024-02-19 RX ADMIN — FUROSEMIDE 20 MG: 10 INJECTION, SOLUTION INTRAMUSCULAR; INTRAVENOUS at 09:02

## 2024-02-19 RX ADMIN — METHYLPREDNISOLONE SODIUM SUCCINATE 40 MG: 40 INJECTION, POWDER, FOR SOLUTION INTRAMUSCULAR; INTRAVENOUS at 09:02

## 2024-02-19 RX ADMIN — ARFORMOTEROL TARTRATE 15 MCG: 15 SOLUTION RESPIRATORY (INHALATION) at 08:02

## 2024-02-19 RX ADMIN — KETOROLAC TROMETHAMINE 15 MG: 30 INJECTION, SOLUTION INTRAMUSCULAR; INTRAVENOUS at 12:02

## 2024-02-19 RX ADMIN — BUDESONIDE 0.25 MG: 0.5 INHALANT RESPIRATORY (INHALATION) at 08:02

## 2024-02-19 RX ADMIN — ONDANSETRON 4 MG: 2 INJECTION INTRAMUSCULAR; INTRAVENOUS at 06:02

## 2024-02-19 RX ADMIN — SENNOSIDES AND DOCUSATE SODIUM 1 TABLET: 8.6; 5 TABLET ORAL at 09:02

## 2024-02-19 RX ADMIN — AZITHROMYCIN DIHYDRATE 250 MG: 250 TABLET ORAL at 09:02

## 2024-02-19 RX ADMIN — PRAVASTATIN SODIUM 10 MG: 10 TABLET ORAL at 09:02

## 2024-02-19 RX ADMIN — CEFTRIAXONE SODIUM 1 G: 1 INJECTION, POWDER, FOR SOLUTION INTRAMUSCULAR; INTRAVENOUS at 03:02

## 2024-02-19 RX ADMIN — LOSARTAN POTASSIUM 100 MG: 50 TABLET, FILM COATED ORAL at 09:02

## 2024-02-19 RX ADMIN — ENOXAPARIN SODIUM 30 MG: 30 INJECTION SUBCUTANEOUS at 04:02

## 2024-02-19 RX ADMIN — HYDRALAZINE HYDROCHLORIDE 10 MG: 20 INJECTION INTRAMUSCULAR; INTRAVENOUS at 04:02

## 2024-02-19 RX ADMIN — AMLODIPINE BESYLATE 5 MG: 5 TABLET ORAL at 09:02

## 2024-02-19 RX ADMIN — HYDRALAZINE HYDROCHLORIDE 10 MG: 20 INJECTION INTRAMUSCULAR; INTRAVENOUS at 01:02

## 2024-02-19 RX ADMIN — POTASSIUM BICARBONATE 35 MEQ: 391 TABLET, EFFERVESCENT ORAL at 09:02

## 2024-02-19 RX ADMIN — ACETAMINOPHEN 1000 MG: 500 TABLET ORAL at 09:02

## 2024-02-19 NOTE — ASSESSMENT & PLAN NOTE
ABG 2/17:  pH 7.448, PCO2 30.5, PO2 49, HCO3 21.1, sat 86 on 10L nasal cannula  CXR with left lower lobe atelectasis and tiny left pleural effusion   IV lasix dc/ed  IV solumedrol started given COPD hx   Nebs scheduled and PRN     Bipap weaned off, questionable if is this is due to volume overload, patient appears dry and CXR not grossly overloaded. Repeat cxr concerning for pna, started on rocephin and azithromycin. Wean o2 as tolerated

## 2024-02-19 NOTE — NURSING
..Nurses Note -- 4 Eyes      2/19/2024   12:01 PM      Skin assessed during: Q Shift Change      [x] No Altered Skin Integrity Present    []Prevention Measures Documented      [] Yes- Altered Skin Integrity Present or Discovered   [] LDA Added if Not in Epic (Describe Wound)   [] New Altered Skin Integrity was Present on Admit and Documented in LDA   [] Wound Image Taken    Wound Care Consulted? No    Attending Nurse:  Shelley Gimenez RN    Second RN/Staff Member:   Terri Rushing RN

## 2024-02-19 NOTE — PLAN OF CARE
Problem: SLP  Goal: SLP Goal  Description: 1) The patient with demonstrate use of aspiration precautions during meals with MIN verbal/visual cues in order to improve carryover of compensatory swallow strategies.  2) Consume Minced & Moist (IDDSI 5) textures and thin liquids (IDDSI 0) with functional oral phase and no overt s/s penetration/aspiration.  3) The patient/family will verbalize rationale for aspiration precautions (COPD/swallowing) following discussion/education across 2 sessions in order to improve self-monitoring and carryover of compensatory swallow strategy   Outcome: Ongoing, Progressing    Clinical swallow evaluation completed. All PO trials consumed with no overt s/s airway compromise. Aspiration risk 2° COPD. REC Minced & Moist (IDDSI 5) textures with thin liquids (IDDSI 0). Will follow.

## 2024-02-19 NOTE — PLAN OF CARE
02/19/24 0800   Patient Assessment/Suction   Level of Consciousness (AVPU) alert   Respiratory Effort Normal;Unlabored   Expansion/Accessory Muscles/Retractions no use of accessory muscles   All Lung Fields Breath Sounds coarse   Rhythm/Pattern, Respiratory pattern regular   Cough Frequency infrequent   Cough Type nonproductive   PRE-TX-O2   Device (Oxygen Therapy) BIPAP   $ Is the patient on Low Flow Oxygen? Yes   SpO2 96 %   Pulse Oximetry Type Continuous   $ Pulse Oximetry - Multiple Charge Pulse Oximetry - Multiple   Pulse 96   Resp (!) 24   Aerosol Therapy   $ Aerosol Therapy Charges Aerosol Treatment   Daily Review of Necessity (SVN) completed   Respiratory Treatment Status (SVN) given   Treatment Route (SVN) in-line   Patient Position (SVN) HOB elevated   Post Treatment Assessment (SVN) breath sounds unchanged   Signs of Intolerance (SVN) none   Breath Sounds Post-Respiratory Treatment   Throughout All Fields Post-Treatment All Fields   Throughout All Fields Post-Treatment no change   Post-treatment Heart Rate (beats/min) 99   Post-treatment Resp Rate (breaths/min) 21   General Safety Checklist   Safety Promotion/Fall Prevention side rails raised   Preset CPAP/BiPAP Settings   Mode Of Delivery BiPAP S/T   $ CPAP/BiPAP Daily Charge BiPAP/CPAP Daily   $ Initial CPAP/BiPAP Setup? No   $ Is patient using? Yes   Size of Mask Medium/Large   Sized Appropriately? Yes   Equipment Type V60   Airway Device Type medium full face mask   Ipap 16   EPAP (cm H2O) 8   Pressure Support (cm H2O) 8   Set Rate (Breaths/Min) 14   ITime (sec) 1   Rise Time (sec) 3   Patient CPAP/BiPAP Settings   Timed Inspiration (Sec) 1   IPAP Rise Time (sec) 3   RR Total (Breaths/Min) 20   Tidal Volume (mL) 660   VE Minute Ventilation (L/min) 13.2 L/min   Peak Inspiratory Pressure (cm H2O) 17   TiTOT (%) 23   Total Leak (L/Min) 20   Patient Trigger - ST Mode Only (%) 99   CPAP/BiPAP Alarms   High Pressure (cm H2O) 45   Low Pressure (cm H2O) 8    High RR (breaths/min) 45   Low RR (breaths/min) 8   Apnea (Sec) 20   Education   $ Education BiPAP;Bronchodilator;15 min

## 2024-02-19 NOTE — NURSING
Nurses Note -- 4 Eyes      2/18/2024   10:33 PM      Skin assessed during: Q Shift Change      [x] No Altered Skin Integrity Present    []Prevention Measures Documented

## 2024-02-19 NOTE — ASSESSMENT & PLAN NOTE
From 2 mg IV morphine  Got IV narcan twice   Head CT repeated did not show anything new or acute findings   Mentation improved following Narcan.   Avoid narcotics   Repeat UA not consistent with UTI

## 2024-02-19 NOTE — PT/OT/SLP EVAL
Speech Language Pathology Evaluation  Bedside Swallow    Patient Name:  Marietta Gates   MRN:  [id number]  Admitting Diagnosis: Closed fracture of left hip    Recommendations:                 General Recommendations:  Dysphagia therapy  Diet recommendations:  Minced & Moist Diet - IDDSI Level 5, Thin liquids - IDDSI Level 0. Gravy on all meats. No dry/crumbly foods.   Aspiration Precautions:  slow pace, respites as needed, upright with all intake, assistance with meals, only feed when alert, Meds whole 1 at a time, Small bites/sips, and Wear oxygen during intake   General Precautions: Standard, aspiration, fall  Communication strategies:  go to room if call light pushed    Assessment:   Clinical swallow evaluation completed. All PO trials consumed with no overt s/s airway compromise. Aspiration risk 2° COPD am current respiratory status. REC Minced & Moist (IDDSI 5) textures with thin liquids (IDDSI 0). Will follow.      History:   PER HPI: 89 WF with COPD hx not on oxygen , still smokes ,  has dementia ,  HTN .      Smoker  Lives at home with daughter  Gets around on her own with cane or walker      She was in usual state of health and tripped over a bucket at home and fell on her left side.    She was then having a lot of pain the daughter said and could not bear weight on the left leg.       So they brought her in to our ER.        Here she was found to have Acute fractures of the left superior and inferior pelvic rami      They spoke to orthopedics who said it was non operative and will need PT and conservative mgt     Plan was to admit her and control pain and get PT eval and then maybe placement      Daughter was in agreement with all of this      They did confirm that she is DNR         Patient was unable to give me any history      She got small morphine IV dose in ER and then became very unresponsive and bradycardic   So then she got some narcan doses   They thought she had some deviation of eyes so  they sent her for second CT of her head.  The first CT head on arrival was normal.      Past Medical History:   Diagnosis Date    Cataract     COPD (chronic obstructive pulmonary disease)     GERD (gastroesophageal reflux disease)     Hyperlipidemia     Hypertension     Osteoporosis     Thyroid disease        Past Surgical History:   Procedure Laterality Date    BUNIONECTOMY      THYROIDECTOMY         Social History: Patient lives with daughter.    Prior Intubation HX:  NA    Modified Barium Swallow: NA    Imaging:  Imaging Results              CT Head Without Contrast (Final result)  Result time 02/15/24 00:27:46      Final result by Herve Fabian MD (02/15/24 00:27:46)                   Narrative:    EXAM DESCRIPTION:  CT HEAD WITHOUT CONTRAST  RadLex: CT HEAD WITHOUT IV CONTRAST    CLINICAL HISTORY:  89 years  Female;  Mental status change, unknown cause    TECHNOLOGIST NOTES:    COMPARISONS: None currently available    TECHNIQUE: Axial CT images were obtained from the skull base to vertex. This exam was performed according to our departmental dose-optimization program, which includes automated exposure control, adjustment of the mA and/or kV according to patient size and/or use of iterative reconstruction techniques.    FINDINGS:  No acute intracranial hemorrhage. No mass effect, midline shift or hydrocephalus. The gray-white matter differentiation is grossly unremarkable. No evidence of acute large territory infarct.   Within the broad range of normal, brain volume is age appropriate. Nonspecific low attenuation in the white matter bilaterally. This is most commonly related to age-indeterminate small vessel ischemic disease. The visualized paranasal sinuses are grossly unremarkable. The mastoid air cells are clear.      IMPRESSION:  1.  No acute intracranial process is identified.  2.  Nonspecific low attenuation in the white matter bilaterally. This is most commonly related to age-indeterminate small vessel  ischemic disease,  3.  If symptoms persist or further imaging is clinically indicated, consider follow-up MRI or repeat CT imaging    Electronically signed by:  Herve Fabian MD  02/15/2024 12:27 AM CST Workstation: [id number]                                     X-Ray Chest AP Portable (Final result)  Result time 02/15/24 08:28:57      Final result by Jet Mcguire MD (02/15/24 08:28:57)                   Narrative:    XR CHEST 1 VIEW    CLINICAL HISTORY:  89 years Female fall    COMPARISON: April 17, 2023    FINDINGS: Cardiac silhouette size is mildly enlarged and similar to prior. Atherosclerotic calcification of the aorta. Blunting of left costophrenic angle compatible with small left pleural effusion as well as left basilar atelectasis. Minimal atelectasis/scarring at the right lung base. Apical pleural-parenchymal scarring. No pneumothorax. No acute osseous abnormality.    IMPRESSION:    Small left pleural effusion.    No acute pulmonary pathology.    Electronically signed by:  Jet Mcguire MD  02/15/2024 08:28 AM CST Workstation: [id number]                                     CT Pelvis Without Contrast (Final result)  Result time 02/14/24 23:02:50      Final result by Herve Fabian MD (02/14/24 23:02:50)                   Narrative:    EXAM DESCRIPTION:  CT PELVIS WITHOUT CONTRAST  RadLex: CT PELVIS WITHOUT IV CONTRAST    CLINICAL HISTORY:  89 years  Female;  Pelvic trauma    TECHNOLOGIST NOTES:    Comparison: None currently available    Technique:    CT of the pelvis was done without intravenous contrast, followed by orthogonal reconstructions.  This exam was performed according to our departmental dose-optimization program, which includes automated exposure control, adjustment of the mA and/or kV according to patient size and/or use of iterative reconstruction technique.    Findings:  Acute fractures of the left superior and inferior pelvic rami there is a small associated left pelvic soft tissue  hematoma. There is no dislocation.   Femoral head contour is maintained. There is no evidence of avascular necrosis. No focal lytic or blastic abnormality.    The visualized pelvic contents are grossly unremarkable. No evidence of an inflammatory process. There are no herniated bowel loops. Bladder appears grossly unremarkable. No evidence of significant free fluid in the pelvis. Uterus present. Extensive degenerative spondylosis in the visualized lower lumbar spine.    Impression:    1.  Acute fractures of the left superior and inferior pelvic rami    Electronically signed by:  Herve Fabian MD  02/14/2024 11:02 PM [facility] Workstation: [id number]                                     CT Cervical Spine Without Contrast (Final result)  Result time 02/14/24 22:49:43      Final result by Herve Fabian MD (02/14/24 22:49:43)                   Narrative:    CT HEAD WITHOUT CONTRAST (accession [id number]), CT CERVICAL SPINE WITHOUT CONTRAST (accession [id number])  RadLex: CT HEAD WITHOUT IV CONTRAST, CT CERVICAL SPINE WITHOUT IV CONTRAST    CLINICAL HISTORY:  89 years  Female;  Head trauma, minor (Age >= 65y)    TECHNOLOGIST NOTES:    TECHNIQUE:    Helical CT imaging performed without contrast. Axial, sagittal and coronal reformatted images are available for review.  This exam was performed according to our departmental dose-optimization program, which includes automated exposure control, adjustment of the mA and/or kV according to patient size and/or use of iterative reconstruction technique.    COMPARISON: None currently available      FINDINGS:    Head:    No acute intracranial hemorrhage. No mass effect, midline shift or hydrocephalus. The gray-white matter differentiation is grossly unremarkable. No evidence of acute large territory infarct.   Within the broad range of normal, brain volume is age appropriate.    The visualized paranasal sinuses are grossly unremarkable. The mastoid air cells are clear.    Cervical  Spine:    There is no acute fracture or compression deformity. No traumatic malalignment. The paravertebral soft tissues are grossly unremarkable.  There is severe emphysema in the lung apices bilaterally.      IMPRESSION:  1.  Head: No evidence of acute traumatic intracranial injury  2.  Cervical spine: No acute fracture or traumatic malalignment of the cervical spine  3.  Please see body of report for non-critical findings  4.  If symptoms persist or further imaging is clinically indicated, consider repeat imaging or follow-up MR imaging    Electronically signed by:  Herve Fabian MD  02/14/2024 10:49 PM [facility] Workstation: [id number]                                     CT Head Without Contrast (Final result)  Result time 02/14/24 22:49:43      Final result by Herve Fabian MD (02/14/24 22:49:43)                   Narrative:    CT HEAD WITHOUT CONTRAST (accession [id number]), CT CERVICAL SPINE WITHOUT CONTRAST (accession [id number])  RadLex: CT HEAD WITHOUT IV CONTRAST, CT CERVICAL SPINE WITHOUT IV CONTRAST    CLINICAL HISTORY:  89 years  Female;  Head trauma, minor (Age >= 65y)    TECHNOLOGIST NOTES:    TECHNIQUE:    Helical CT imaging performed without contrast. Axial, sagittal and coronal reformatted images are available for review.  This exam was performed according to our departmental dose-optimization program, which includes automated exposure control, adjustment of the mA and/or kV according to patient size and/or use of iterative reconstruction technique.    COMPARISON: None currently available      FINDINGS:    Head:    No acute intracranial hemorrhage. No mass effect, midline shift or hydrocephalus. The gray-white matter differentiation is grossly unremarkable. No evidence of acute large territory infarct.   Within the broad range of normal, brain volume is age appropriate.    The visualized paranasal sinuses are grossly unremarkable. The mastoid air cells are clear.    Cervical Spine:    There is no acute  "fracture or compression deformity. No traumatic malalignment. The paravertebral soft tissues are grossly unremarkable.  There is severe emphysema in the lung apices bilaterally.      IMPRESSION:  1.  Head: No evidence of acute traumatic intracranial injury  2.  Cervical spine: No acute fracture or traumatic malalignment of the cervical spine  3.  Please see body of report for non-critical findings  4.  If symptoms persist or further imaging is clinically indicated, consider repeat imaging or follow-up MR imaging    Electronically signed by:  Herve Fabian MD  02/14/2024 10:49 PM CST Workstation: [id number]                                     Prior diet: soft, cut into very small pieces. Thin liquids.      Subjective     Pt without complaints.    "At Stephen's house."    Pain/Comfort:  Pain Rating 1: 0/10    Respiratory Status: Nasal cannula, flow 5 L/min, high flow    Objective:   Pt seen for clinical swallow evaluation. She is awake and cooperative. Oriented to self only. Daughter at bedside and provides history. Daughter denies h/o dysphagia.     Oral Musculature Evaluation  Oral Musculature: general weakness  Dentition: scattered dentition  Secretion Management: adequate  Mucosal Quality: adequate  Oral Labial Strength and Mobility: WFL (mildly reduced retraction on L)  Lingual Strength and Mobility:  (mild deviation on L upon protrusion)  Buccal Strength and Mobility: WFL    Bedside Swallow Eval:   Consistencies Assessed:  Thin liquids --via tsp, cup and straw  Puree --applesauce  Soft solids --softened mary cracker in applesauce/pudding      Oral Phase:   WFL for soft/moist solids    Pharyngeal Phase:   no overt clinical signs/symptoms of aspiration    Compensatory Strategies  Slow pace    Treatment: Pt/family educated re: results/recs of evaluation, aspiration risk, aspiration precautions, SLP role and POC. Receptive to information provided.     Goals:   Multidisciplinary Problems       SLP Goals          " Problem: SLP    Goal Priority Disciplines Outcome   SLP Goal     SLP Ongoing, Progressing   Description: 1) The patient with demonstrate use of aspiration precautions during meals with MIN verbal/visual cues in order to improve carryover of compensatory swallow strategies.  2) Consume Minced & Moist (IDDSI 5) textures and thin liquids (IDDSI 0) with functional oral phase and no overt s/s penetration/aspiration.  3) The patient/family will verbalize rationale for aspiration precautions (COPD/swallowing) following discussion/education across 2 sessions in order to improve self-monitoring and carryover of compensatory swallow strategy                        Plan:     Patient to be seen:  3 x/week   Plan of Care expires:  02/26/24  Plan of Care reviewed with:  patient, daughter, son   SLP Follow-Up:  Yes       Discharge recommendations:  Moderate Intensity Therapy   Barriers to Discharge:  Level of Skilled Assistance Needed .    Time Tracking:     SLP Treatment Date:   02/19/24  Speech Start Time:  1047  Speech Stop Time:  1111     Speech Total Time (min):  24 min    Billable Minutes: Eval 16 , Treatment Swallowing Dysfunction 8, and Total Time 24 02/19/2024

## 2024-02-19 NOTE — PLAN OF CARE
Met with patient and Nicolas Gates (Son) [phone] ([place]) and Paris Pierre (Healthcare Power of  / daughter) [phone] ([place]), discussed final disposition for patient which remains that all want patient to go to SNF.  Patient has not been agreeable / participatory with PT and OT.  [facility]   [place], and [facility].  Family adamantly refused referrals to [facility] or [facility]. Will continue to follow and update as appropriate.

## 2024-02-19 NOTE — ASSESSMENT & PLAN NOTE
Patient has hypokalemia which is Acute and currently controlled. Most recent potassium levels reviewed-   Lab Results   Component Value Date    K 3.3 (L) 02/19/2024   . Will continue potassium replacement per protocol and recheck repeat levels after replacement completed.

## 2024-02-19 NOTE — PLAN OF CARE
Problem: Physical Therapy  Goal: Physical Therapy Goal  Description: Goals to be met by: 3/15/2024     Patient will increase functional independence with mobility by performin. Supine to sit with Moderate Assistance  2. Sit to supine with Moderate Assistance  3. Bed to chair transfer with Moderate Assistance using No Assistive Device  4. Gait  x 10  feet with Minimal Assistance using Rolling Walker.     Outcome: Ongoing, Progressing

## 2024-02-19 NOTE — SUBJECTIVE & OBJECTIVE
Interval History: Pt required Bipap overnight. Weaned off this morning to 5L NC. CXR  concerning for PNA. Added azithromycin. ST eval today. Mentation improving.     Review of Systems   Respiratory:  Negative for cough and shortness of breath.    Cardiovascular:  Negative for chest pain.     Objective:     Vital Signs (Most Recent):  Temp: 96.9 °F (36.1 °C) (02/19/24 1100)  Pulse: 90 (02/19/24 1100)  Resp: (!) 31 (02/19/24 1100)  BP: (!) 179/79 (02/19/24 1100)  SpO2: (!) 88 % (02/19/24 1100) Vital Signs (24h Range):  Temp:  [96.9 °F (36.1 °C)-98 °F (36.7 °C)] 96.9 °F (36.1 °C)  Pulse:  [] 90  Resp:  [23-35] 31  SpO2:  [85 %-96 %] 88 %  BP: (138-199)/(65-98) 179/79     Weight: 54.4 kg (120 lb)  Body mass index is 21.26 kg/m².    Intake/Output Summary (Last 24 hours) at 2/19/2024 1325  Last data filed at 2/19/2024 0553  Gross per 24 hour   Intake 100 ml   Output 550 ml   Net -450 ml         Physical Exam  Vitals and nursing note reviewed.   Constitutional:       General: She is not in acute distress.  HENT:      Mouth/Throat:      Mouth: Mucous membranes are moist.   Cardiovascular:      Rate and Rhythm: Normal rate and regular rhythm.      Heart sounds: Normal heart sounds.   Pulmonary:      Effort: No respiratory distress.   Abdominal:      Palpations: Abdomen is soft.   Musculoskeletal:         General: Tenderness present.   Skin:     General: Skin is warm and dry.   Neurological:      Mental Status: She is alert. She is disoriented.   Psychiatric:         Mood and Affect: Mood normal.             Significant Labs: All pertinent labs within the past 24 hours have been reviewed.  CBC:   Recent Labs   Lab 02/18/24  0335 02/19/24  0647   WBC 17.09* 11.26   HGB 12.6 12.9   HCT 38.4 39.4    328     CMP:   Recent Labs   Lab 02/18/24  0335 02/19/24  0647    140   K 3.6 3.3*   CL 99 100   CO2 25 25   * 171*   BUN 32* 55*   CREATININE 1.1 1.0   CALCIUM 8.7 9.0   PROT 6.6 7.8   ALBUMIN 3.3* 3.5    BILITOT 0.6 0.6   ALKPHOS 51* 52*   AST 13 16   ALT 6* 9*   ANIONGAP 12 15       Significant Imaging: I have reviewed all pertinent imaging results/findings within the past 24 hours.

## 2024-02-19 NOTE — PROGRESS NOTES
[facility]  Progress Note    Patient Name: Marietta Gates  MRN: [id number]  Patient Class: IP- Inpatient   Admission Date: 2/14/2024  Length of Stay: 4 days  Attending Physician: Estuardo Campbell MD  Primary Care Provider: Ollie Bartlett MD        Subjective:     Principal Problem:Closed fracture of left hip        HPI:  89 WF with COPD hx not on oxygen , still smokes ,  has dementia ,  HTN .     Smoker  Lives at home with daughter  Gets around on her own with cane or walker     She was in usual state of health and tripped over a bucket at home and fell on her left side.    She was then having a lot of pain the daughter said and could not bear weight on the left leg.      So they brought her in to our ER.       Here she was found to have Acute fractures of the left superior and inferior pelvic rami     They spoke to orthopedics who said it was non operative and will need PT and conservative mgt    Plan was to admit her and control pain and get PT eval and then maybe placement     Daughter was in agreement with all of this     They did confirm that she is DNR       Patient was unable to give me any history     She got small morphine IV dose in ER and then became very unresponsive and bradycardic   So then she got some narcan doses   They thought she had some deviation of eyes so they sent her for second CT of her head.  The first CT head on arrival was normal.        Overview/Hospital Course:  Patient admitted to Med/Surg.  Orthopedic Surgery consulted for acute fractures of the left superior and inferior pubic rami.  Non-operative, conservative management per Ortho, patient can weight bear as tolerated.  PT/OT consulted.  Patient with a negative reaction to Morphine requiring narcan administration.  Pain controlled with Tylenol.  Patient had increased oxygen requirements escalating to 15 L/min.  CXR with pulmonary edema.  IV lasix started.  IV solumedrol also started.  ABG  with PH 7.448, PCO2 30.5, PO2 49, HCO3 21.1.  Patient placed on Bipap and orders placed to transfer to Stepdown 2/17.  Discussed with patient's family, confirmed DNR status.  Oxygenation improving, weaned off of bipap. CXR more consistent with COPD, doesn't seem overloaded. ST consulted. Repeat CXR showed concerns for PNA, abx started. ST evaluated the patient, placed on minced diet. Plan for discharge to rehab if patient remains stable.      Interval History: Pt required Bipap overnight. Weaned off this morning to 5L NC. CXR  concerning for PNA. Added azithromycin. ST eval today. Mentation improving.     Review of Systems   Respiratory:  Negative for cough and shortness of breath.    Cardiovascular:  Negative for chest pain.     Objective:     Vital Signs (Most Recent):  Temp: 96.9 °F (36.1 °C) (02/19/24 1100)  Pulse: 90 (02/19/24 1100)  Resp: (!) 31 (02/19/24 1100)  BP: (!) 179/79 (02/19/24 1100)  SpO2: (!) 88 % (02/19/24 1100) Vital Signs (24h Range):  Temp:  [96.9 °F (36.1 °C)-98 °F (36.7 °C)] 96.9 °F (36.1 °C)  Pulse:  [] 90  Resp:  [23-35] 31  SpO2:  [85 %-96 %] 88 %  BP: (138-199)/(65-98) 179/79     Weight: 54.4 kg (120 lb)  Body mass index is 21.26 kg/m².    Intake/Output Summary (Last 24 hours) at 2/19/2024 1325  Last data filed at 2/19/2024 0553  Gross per 24 hour   Intake 100 ml   Output 550 ml   Net -450 ml         Physical Exam  Vitals and nursing note reviewed.   Constitutional:       General: She is not in acute distress.  HENT:      Mouth/Throat:      Mouth: Mucous membranes are moist.   Cardiovascular:      Rate and Rhythm: Normal rate and regular rhythm.      Heart sounds: Normal heart sounds.   Pulmonary:      Effort: No respiratory distress.   Abdominal:      Palpations: Abdomen is soft.   Musculoskeletal:         General: Tenderness present.   Skin:     General: Skin is warm and dry.   Neurological:      Mental Status: She is alert. She is disoriented.   Psychiatric:         Mood and  Affect: Mood normal.             Significant Labs: All pertinent labs within the past 24 hours have been reviewed.  CBC:   Recent Labs   Lab 02/18/24  0335 02/19/24  0647   WBC 17.09* 11.26   HGB 12.6 12.9   HCT 38.4 39.4    328     CMP:   Recent Labs   Lab 02/18/24  0335 02/19/24  0647    140   K 3.6 3.3*   CL 99 100   CO2 25 25   * 171*   BUN 32* 55*   CREATININE 1.1 1.0   CALCIUM 8.7 9.0   PROT 6.6 7.8   ALBUMIN 3.3* 3.5   BILITOT 0.6 0.6   ALKPHOS 51* 52*   AST 13 16   ALT 6* 9*   ANIONGAP 12 15       Significant Imaging: I have reviewed all pertinent imaging results/findings within the past 24 hours.    Assessment/Plan:      * Closed fracture of left hip  S/P mechanical fall   Acute fractures of the left superior and inferior pelvic rami   Orthopedic Surgery consulted  Non-operative, conservative management   PT/OT consulted   Tylenol for pain control.  Patient had drop in O2 sat with change in mental status after being given morphine in the ER.  Avoid Narcotics.   Plan for discharge to rehab if patient remains stable         Advanced care planning/counseling discussion  Advance Care Planning    Date: 02/17/2024    [facility]  I engaged the family in a voluntary conversation about advance care planning and we specifically addressed what the goals of care would be moving forward, in light of the patient's change in clinical status, specifically patient's worsening respiratory status.  We did specifically address the patient's likely prognosis, which is poor.  We explored the patient's values and preferences for future care.  The family endorses that what is most important right now is to focus on improvement in condition but with limits to invasive therapies and comfort and QOL     Accordingly, we have decided that the best plan to meet the patient's goals includes continuing with treatment and no further escalation in treatment.      I did explain the role for hospice care at this stage of the  patient's illness, including its ability to help the patient live with the best quality of life possible.  We will not be making a hospice referral at this time, but may consult hospice soon if patient continues to deteriorate.      I spent a total of 25 minutes engaging the patient in this advance care planning discussion.             Hypoxemia  ABG 2/17:  pH 7.448, PCO2 30.5, PO2 49, HCO3 21.1, sat 86 on 10L nasal cannula  CXR with left lower lobe atelectasis and tiny left pleural effusion   IV lasix dc/ed  IV solumedrol started given COPD hx   Nebs scheduled and PRN     Bipap weaned off, questionable if is this is due to volume overload, patient appears dry and CXR not grossly overloaded. Repeat cxr concerning for pna, started on rocephin and azithromycin. Wean o2 as tolerated        Multiple stable closed lateral compression fractures of pelvis  Ortho consulted-conservative management   PT/OT      Hypokalemia  Patient has hypokalemia which is Acute and currently controlled. Most recent potassium levels reviewed-   Lab Results   Component Value Date    K 3.3 (L) 02/19/2024   . Will continue potassium replacement per protocol and recheck repeat levels after replacement completed.     Encephalopathy, toxic  From 2 mg IV morphine  Got IV narcan twice   Head CT repeated did not show anything new or acute findings   Mentation improved following Narcan.   Avoid narcotics   Repeat UA not consistent with UTI        VTE Risk Mitigation (From admission, onward)           Ordered     enoxaparin injection 30 mg  Daily        Note to Pharmacy: Ht:    Wt: 49.9 kg (110 lb)  Estimated Creatinine Clearance: 23.6 mL/min (based on SCr of 1.2 mg/dL).  Body mass index is 18.88 kg/m².    02/15/24 0054     IP VTE HIGH RISK PATIENT  Once         02/15/24 0052     Place sequential compression device  Until discontinued         02/15/24 0052                    Discharge Planning   KENNY: 2/20/2024     Code Status: DNR   Is the patient  medically ready for discharge?:     Reason for patient still in hospital (select all that apply): Patient trending condition  Discharge Plan A: Skilled Nursing Facility   Discharge Delays: None known at this time              Estuardo Campbell MD  Department of Hospital Medicine   [facility]

## 2024-02-19 NOTE — PT/OT/SLP PROGRESS
Physical Therapy Treatment    Patient Name:  Marietta Gates   MRN:  [id number]    Recommendations:     Discharge Recommendations: Moderate Intensity Therapy  Discharge Equipment Recommendations: to be determined by next level of care  Barriers to discharge:  2 person assist for transfers    Assessment:     Marietta Gates is a 89 y.o. female admitted with a medical diagnosis of Closed fracture of left hip.  She presents with the following impairments/functional limitations: weakness, impaired endurance, impaired self care skills, impaired functional mobility, impaired balance, impaired cognition, decreased upper extremity function, decreased lower extremity function, decreased safety awareness, pain, impaired cardiopulmonary response to activity, orthopedic precautions. Patient was premedicated for activity. SpO2 of 90% at rest on 5L HFNC. She requires MaxA x2 for supine to sit and ModA x2 for sit to stand with RW x2 trials with LLE WBAT. PT tried to have patient take side steps to the left, but she was minimally able to advance feet and returned to sitting EOB.      Rehab Prognosis: Good; patient would benefit from acute skilled PT services to address these deficits and reach maximum level of function.    Recent Surgery: * No surgery found *      Plan:     During this hospitalization, patient to be seen 6 x/week to address the identified rehab impairments via gait training, therapeutic activities, therapeutic exercises and progress toward the following goals:    Plan of Care Expires:  03/15/24    Subjective     Chief Complaint: daughter states patient has not received lunch tray   Patient/Family Comments/goals: SNF  Pain/Comfort:  Pain Rating 1:  (not rated)  Location - Side 1: Left  Location 1:  (pubic area)  Pain Addressed 1: Pre-medicate for activity      Objective:     Communicated with SHANNAN Rosa prior to session.  Patient found HOB elevated with bed alarm, blood pressure cuff, oxygen, PureWick, pulse  ox (continuous), telemetry upon PT entry to room.     General Precautions: Standard, fall, respiratory  Orthopedic Precautions: LLE weight bearing as tolerated  Braces: N/A  Respiratory Status: High flow, flow 5 L/min     Functional Mobility:  Bed Mobility:     Supine to Sit: maximal assistance and of 2 persons  Transfers:     Sit to Stand:  moderate assistance and of 2 persons with rolling walker      AM-PAC 6 CLICK MOBILITY  Turning over in bed (including adjusting bedclothes, sheets and blankets)?: 2  Sitting down on and standing up from a chair with arms (e.g., wheelchair, bedside commode, etc.): 2  Moving from lying on back to sitting on the side of the bed?: 2  Moving to and from a bed to a chair (including a wheelchair)?: 1  Need to walk in hospital room?: 1  Climbing 3-5 steps with a railing?: 1  Basic Mobility Total Score: 9       Treatment & Education:  Patient was educated on the importance of OOB activity and functional mobility to negate negative effects of prolonged bed rest during hospitalization, safe transfers and ambulation, LLE WBAT, and D/C planning     Patient left HOB elevated with all lines intact, call button in reach, bed alarm on, RN notified, and daughter present..    GOALS:   Multidisciplinary Problems       Physical Therapy Goals          Problem: Physical Therapy    Goal Priority Disciplines Outcome Goal Variances Interventions   Physical Therapy Goal     PT, PT/OT Ongoing, Progressing     Description: Goals to be met by: 3/15/2024     Patient will increase functional independence with mobility by performin. Supine to sit with Moderate Assistance  2. Sit to supine with Moderate Assistance  3. Bed to chair transfer with Moderate Assistance using No Assistive Device  4. Gait  x 10  feet with Minimal Assistance using Rolling Walker.                          Time Tracking:     PT Received On: 24  PT Start Time: 1323     PT Stop Time: 1340  PT Total Time (min): 17 min      Billable Minutes: Therapeutic Activity 17    Treatment Type: Treatment  PT/PTA: PT     Number of PTA visits since last PT visit: 0     02/19/2024

## 2024-02-19 NOTE — PT/OT/SLP PROGRESS
Occupational Therapy      Patient Name:  Marietta Gates   MRN:  [id number]    Patient not seen today secondary to Patient unwilling to participate. Will follow-up tomorrow.    2/19/2024

## 2024-02-20 LAB
ALBUMIN SERPL BCP-MCNC: 3.4 G/DL (ref 3.5–5.2)
ALP SERPL-CCNC: 47 U/L (ref 55–135)
ALT SERPL W/O P-5'-P-CCNC: 14 U/L (ref 10–44)
ANION GAP SERPL CALC-SCNC: 9 MMOL/L (ref 8–16)
AST SERPL-CCNC: 23 U/L (ref 10–40)
BASOPHILS # BLD AUTO: 0.02 K/UL (ref 0–0.2)
BASOPHILS NFR BLD: 0.2 % (ref 0–1.9)
BILIRUB SERPL-MCNC: 0.6 MG/DL (ref 0.1–1)
BNP SERPL-MCNC: 236 PG/ML (ref 0–99)
BUN SERPL-MCNC: 69 MG/DL (ref 10–30)
CALCIUM SERPL-MCNC: 9 MG/DL (ref 8.7–10.5)
CHLORIDE SERPL-SCNC: 98 MMOL/L (ref 95–110)
CO2 SERPL-SCNC: 31 MMOL/L (ref 23–29)
CREAT SERPL-MCNC: 1.4 MG/DL (ref 0.5–1.4)
DIFFERENTIAL METHOD BLD: ABNORMAL
EOSINOPHIL # BLD AUTO: 0 K/UL (ref 0–0.5)
EOSINOPHIL NFR BLD: 0 % (ref 0–8)
ERYTHROCYTE [DISTWIDTH] IN BLOOD BY AUTOMATED COUNT: 13.4 % (ref 11.5–14.5)
EST. GFR  (NO RACE VARIABLE): 35.3 ML/MIN/1.73 M^2
GLUCOSE SERPL-MCNC: 195 MG/DL (ref 70–110)
HCT VFR BLD AUTO: 39.5 % (ref 37–48.5)
HGB BLD-MCNC: 13 G/DL (ref 12–16)
IMM GRANULOCYTES # BLD AUTO: 0.1 K/UL (ref 0–0.04)
IMM GRANULOCYTES NFR BLD AUTO: 0.9 % (ref 0–0.5)
LYMPHOCYTES # BLD AUTO: 0.3 K/UL (ref 1–4.8)
LYMPHOCYTES NFR BLD: 2.5 % (ref 18–48)
MAGNESIUM SERPL-MCNC: 2.4 MG/DL (ref 1.6–2.6)
MCH RBC QN AUTO: 31.3 PG (ref 27–31)
MCHC RBC AUTO-ENTMCNC: 32.9 G/DL (ref 32–36)
MCV RBC AUTO: 95 FL (ref 82–98)
MONOCYTES # BLD AUTO: 0.3 K/UL (ref 0.3–1)
MONOCYTES NFR BLD: 3 % (ref 4–15)
NEUTROPHILS # BLD AUTO: 10.1 K/UL (ref 1.8–7.7)
NEUTROPHILS NFR BLD: 93.4 % (ref 38–73)
NRBC BLD-RTO: 0 /100 WBC
PLATELET # BLD AUTO: 344 K/UL (ref 150–450)
PMV BLD AUTO: 11 FL (ref 9.2–12.9)
POTASSIUM SERPL-SCNC: 3.7 MMOL/L (ref 3.5–5.1)
PROT SERPL-MCNC: 7.4 G/DL (ref 6–8.4)
RBC # BLD AUTO: 4.16 M/UL (ref 4–5.4)
SODIUM SERPL-SCNC: 138 MMOL/L (ref 136–145)
WBC # BLD AUTO: 10.83 K/UL (ref 3.9–12.7)

## 2024-02-20 PROCEDURE — 63700000 PHARM REV CODE 250 ALT 637 W/O HCPCS: Performed by: STUDENT IN AN ORGANIZED HEALTH CARE EDUCATION/TRAINING PROGRAM

## 2024-02-20 PROCEDURE — 94640 AIRWAY INHALATION TREATMENT: CPT

## 2024-02-20 PROCEDURE — 63600175 PHARM REV CODE 636 W HCPCS: Performed by: NURSE PRACTITIONER

## 2024-02-20 PROCEDURE — 51701 INSERT BLADDER CATHETER: CPT

## 2024-02-20 PROCEDURE — 83735 ASSAY OF MAGNESIUM: CPT | Performed by: NURSE PRACTITIONER

## 2024-02-20 PROCEDURE — 94761 N-INVAS EAR/PLS OXIMETRY MLT: CPT

## 2024-02-20 PROCEDURE — 99900035 HC TECH TIME PER 15 MIN (STAT)

## 2024-02-20 PROCEDURE — 36415 COLL VENOUS BLD VENIPUNCTURE: CPT | Performed by: NURSE PRACTITIONER

## 2024-02-20 PROCEDURE — 63600175 PHARM REV CODE 636 W HCPCS: Performed by: STUDENT IN AN ORGANIZED HEALTH CARE EDUCATION/TRAINING PROGRAM

## 2024-02-20 PROCEDURE — 25000242 PHARM REV CODE 250 ALT 637 W/ HCPCS: Performed by: NURSE PRACTITIONER

## 2024-02-20 PROCEDURE — 97535 SELF CARE MNGMENT TRAINING: CPT

## 2024-02-20 PROCEDURE — 94660 CPAP INITIATION&MGMT: CPT

## 2024-02-20 PROCEDURE — 25000003 PHARM REV CODE 250: Performed by: INTERNAL MEDICINE

## 2024-02-20 PROCEDURE — 97530 THERAPEUTIC ACTIVITIES: CPT

## 2024-02-20 PROCEDURE — 25000003 PHARM REV CODE 250: Performed by: STUDENT IN AN ORGANIZED HEALTH CARE EDUCATION/TRAINING PROGRAM

## 2024-02-20 PROCEDURE — 85025 COMPLETE CBC W/AUTO DIFF WBC: CPT | Performed by: NURSE PRACTITIONER

## 2024-02-20 PROCEDURE — 80053 COMPREHEN METABOLIC PANEL: CPT | Performed by: NURSE PRACTITIONER

## 2024-02-20 PROCEDURE — 51798 US URINE CAPACITY MEASURE: CPT

## 2024-02-20 PROCEDURE — 83880 ASSAY OF NATRIURETIC PEPTIDE: CPT | Performed by: STUDENT IN AN ORGANIZED HEALTH CARE EDUCATION/TRAINING PROGRAM

## 2024-02-20 PROCEDURE — 25000003 PHARM REV CODE 250: Performed by: NURSE PRACTITIONER

## 2024-02-20 PROCEDURE — 99900031 HC PATIENT EDUCATION (STAT)

## 2024-02-20 PROCEDURE — 27000221 HC OXYGEN, UP TO 24 HOURS

## 2024-02-20 PROCEDURE — 21000000 HC CCU ICU ROOM CHARGE

## 2024-02-20 PROCEDURE — 36415 COLL VENOUS BLD VENIPUNCTURE: CPT | Performed by: STUDENT IN AN ORGANIZED HEALTH CARE EDUCATION/TRAINING PROGRAM

## 2024-02-20 PROCEDURE — 27100171 HC OXYGEN HIGH FLOW UP TO 24 HOURS

## 2024-02-20 RX ORDER — POLYETHYLENE GLYCOL 3350 17 G/17G
17 POWDER, FOR SOLUTION ORAL DAILY
Status: DISCONTINUED | OUTPATIENT
Start: 2024-02-20 | End: 2024-02-21 | Stop reason: HOSPADM

## 2024-02-20 RX ORDER — KETOROLAC TROMETHAMINE 30 MG/ML
15 INJECTION, SOLUTION INTRAMUSCULAR; INTRAVENOUS ONCE AS NEEDED
Status: COMPLETED | OUTPATIENT
Start: 2024-02-20 | End: 2024-02-21

## 2024-02-20 RX ADMIN — ARFORMOTEROL TARTRATE 15 MCG: 15 SOLUTION RESPIRATORY (INHALATION) at 08:02

## 2024-02-20 RX ADMIN — AZITHROMYCIN DIHYDRATE 250 MG: 250 TABLET ORAL at 08:02

## 2024-02-20 RX ADMIN — BUDESONIDE 0.25 MG: 0.5 INHALANT RESPIRATORY (INHALATION) at 07:02

## 2024-02-20 RX ADMIN — POTASSIUM BICARBONATE 50 MEQ: 977.5 TABLET, EFFERVESCENT ORAL at 05:02

## 2024-02-20 RX ADMIN — LOSARTAN POTASSIUM 100 MG: 50 TABLET, FILM COATED ORAL at 08:02

## 2024-02-20 RX ADMIN — ARFORMOTEROL TARTRATE 15 MCG: 15 SOLUTION RESPIRATORY (INHALATION) at 07:02

## 2024-02-20 RX ADMIN — SENNOSIDES AND DOCUSATE SODIUM 1 TABLET: 8.6; 5 TABLET ORAL at 08:02

## 2024-02-20 RX ADMIN — PRAVASTATIN SODIUM 10 MG: 10 TABLET ORAL at 08:02

## 2024-02-20 RX ADMIN — ENOXAPARIN SODIUM 30 MG: 30 INJECTION SUBCUTANEOUS at 05:02

## 2024-02-20 RX ADMIN — BUPROPION HYDROCHLORIDE 300 MG: 150 TABLET, EXTENDED RELEASE ORAL at 08:02

## 2024-02-20 RX ADMIN — CEFTRIAXONE SODIUM 1 G: 1 INJECTION, POWDER, FOR SOLUTION INTRAMUSCULAR; INTRAVENOUS at 05:02

## 2024-02-20 RX ADMIN — ACETAMINOPHEN 1000 MG: 500 TABLET ORAL at 08:02

## 2024-02-20 RX ADMIN — ACETAMINOPHEN 1000 MG: 500 TABLET ORAL at 06:02

## 2024-02-20 RX ADMIN — METHYLPREDNISOLONE SODIUM SUCCINATE 40 MG: 40 INJECTION, POWDER, FOR SOLUTION INTRAMUSCULAR; INTRAVENOUS at 08:02

## 2024-02-20 RX ADMIN — AMLODIPINE BESYLATE 5 MG: 5 TABLET ORAL at 08:02

## 2024-02-20 RX ADMIN — POLYETHYLENE GLYCOL 3350 17 G: 17 POWDER, FOR SOLUTION ORAL at 01:02

## 2024-02-20 RX ADMIN — BUDESONIDE 0.25 MG: 0.5 INHALANT RESPIRATORY (INHALATION) at 08:02

## 2024-02-20 NOTE — NURSING
Nurses Note -- 4 Eyes      2/19/2024   10:07 PM      Skin assessed during: Q Shift Change      [x] No Altered Skin Integrity Present    []Prevention Measures Documented      [] Yes- Altered Skin Integrity Present or Discovered   [] LDA Added if Not in Epic (Describe Wound)   [] New Altered Skin Integrity was Present on Admit and Documented in LDA   [] Wound Image Taken      No wounds present at this time. Randall scale 13.    Wound Care Consulted? No    Attending Nurse:  Peng Barcenas RN/Staff Member:   SHANNAN Marroquin

## 2024-02-20 NOTE — PROGRESS NOTES
[facility]  Progress Note    Patient Name: Marietta Gates  MRN: [id number]  Patient Class: IP- Inpatient   Admission Date: 2/14/2024  Length of Stay: 5 days  Attending Physician: Estuardo Campbell MD  Primary Care Provider: Ollie Bartlett MD        Subjective:     Principal Problem:Closed fracture of left hip        HPI:  89 WF with COPD hx not on oxygen , still smokes ,  has dementia ,  HTN .     Smoker  Lives at home with daughter  Gets around on her own with cane or walker     She was in usual state of health and tripped over a bucket at home and fell on her left side.    She was then having a lot of pain the daughter said and could not bear weight on the left leg.      So they brought her in to our ER.       Here she was found to have Acute fractures of the left superior and inferior pelvic rami     They spoke to orthopedics who said it was non operative and will need PT and conservative mgt    Plan was to admit her and control pain and get PT eval and then maybe placement     Daughter was in agreement with all of this     They did confirm that she is DNR       Patient was unable to give me any history     She got small morphine IV dose in ER and then became very unresponsive and bradycardic   So then she got some narcan doses   They thought she had some deviation of eyes so they sent her for second CT of her head.  The first CT head on arrival was normal.        Overview/Hospital Course:  Patient admitted to Med/Surg.  Orthopedic Surgery consulted for acute fractures of the left superior and inferior pubic rami.  Non-operative, conservative management per Ortho, patient can weight bear as tolerated.  PT/OT consulted.  Patient with a negative reaction to Morphine requiring narcan administration.  Pain controlled with Tylenol.  Patient had increased oxygen requirements escalating to 15 L/min.  CXR with pulmonary edema.  IV lasix started.  IV solumedrol also started.  ABG  with PH 7.448, PCO2 30.5, PO2 49, HCO3 21.1.  Patient placed on Bipap and orders placed to transfer to Stepdown 2/17.  Discussed with patient's family, confirmed DNR status.  Oxygenation improving, weaned off of bipap. CXR more consistent with COPD, doesn't seem overloaded. ST consulted. Repeat CXR showed concerns for PNA, abx started. ST evaluated the patient, placed on minced diet. Plan for discharge to rehab if patient remains stable.  Transfer out of stepdown. Needs o2 to be weaned more prior to discharge.     Interval History: Pt arouses to verbal stimuli. Weaned o2 to 3L NC. More alert. Remain disoriented.      Review of Systems   Respiratory:  Negative for cough and shortness of breath.    Cardiovascular:  Negative for chest pain.     Objective:     Vital Signs (Most Recent):  Temp: 97.5 °F (36.4 °C) (02/20/24 1125)  Pulse: 92 (02/20/24 1107)  Resp: (!) 26 (02/20/24 1107)  BP: (!) 158/110 (02/20/24 1107)  SpO2: 95 % (02/20/24 1107) Vital Signs (24h Range):  Temp:  [97.3 °F (36.3 °C)-97.8 °F (36.6 °C)] 97.5 °F (36.4 °C)  Pulse:  [] 92  Resp:  [18-38] 26  SpO2:  [88 %-100 %] 95 %  BP: (119-178)/() 158/110     Weight: 54.4 kg (120 lb)  Body mass index is 21.26 kg/m².    Intake/Output Summary (Last 24 hours) at 2/20/2024 1215  Last data filed at 2/20/2024 0910  Gross per 24 hour   Intake 944 ml   Output 875 ml   Net 69 ml         Physical Exam  Vitals and nursing note reviewed.   Constitutional:       General: She is not in acute distress.  HENT:      Mouth/Throat:      Mouth: Mucous membranes are moist.   Cardiovascular:      Rate and Rhythm: Normal rate and regular rhythm.      Heart sounds: Normal heart sounds.   Pulmonary:      Effort: No respiratory distress.   Abdominal:      Palpations: Abdomen is soft.   Musculoskeletal:         General: Tenderness present.   Skin:     General: Skin is warm and dry.   Neurological:      Mental Status: She is alert. She is disoriented.   Psychiatric:          Mood and Affect: Mood normal.             Significant Labs: All pertinent labs within the past 24 hours have been reviewed.  CBC:   Recent Labs   Lab 02/19/24 0647 02/20/24 0401   WBC 11.26 10.83   HGB 12.9 13.0   HCT 39.4 39.5    344     CMP:   Recent Labs   Lab 02/19/24 0647 02/19/24 2007 02/20/24  0401     --  138   K 3.3* 3.9 3.7     --  98   CO2 25  --  31*   *  --  195*   BUN 55*  --  69*   CREATININE 1.0  --  1.4   CALCIUM 9.0  --  9.0   PROT 7.8  --  7.4   ALBUMIN 3.5  --  3.4*   BILITOT 0.6  --  0.6   ALKPHOS 52*  --  47*   AST 16  --  23   ALT 9*  --  14   ANIONGAP 15  --  9       Significant Imaging: I have reviewed all pertinent imaging results/findings within the past 24 hours.    Assessment/Plan:      * Closed fracture of left hip  S/P mechanical fall   Acute fractures of the left superior and inferior pelvic rami   Orthopedic Surgery consulted  Non-operative, conservative management   PT/OT consulted   Tylenol for pain control.  Patient had drop in O2 sat with change in mental status after being given morphine in the ER.  Avoid Narcotics.   Plan for discharge to rehab if patient remains stable         Advanced care planning/counseling discussion  Advance Care Planning    Date: 02/17/2024    [place] [facility]  I engaged the family in a voluntary conversation about advance care planning and we specifically addressed what the goals of care would be moving forward, in light of the patient's change in clinical status, specifically patient's worsening respiratory status.  We did specifically address the patient's likely prognosis, which is poor.  We explored the patient's values and preferences for future care.  The family endorses that what is most important right now is to focus on improvement in condition but with limits to invasive therapies and comfort and QOL     Accordingly, we have decided that the best plan to meet the patient's goals includes continuing with treatment and no  further escalation in treatment.      I did explain the role for hospice care at this stage of the patient's illness, including its ability to help the patient live with the best quality of life possible.  We will not be making a hospice referral at this time, but may consult hospice soon if patient continues to deteriorate.      I spent a total of 25 minutes engaging the patient in this advance care planning discussion.             Hypoxemia  ABG 2/17:  pH 7.448, PCO2 30.5, PO2 49, HCO3 21.1, sat 86 on 10L nasal cannula  CXR with left lower lobe atelectasis and tiny left pleural effusion   IV lasix dc/ed  IV solumedrol started given COPD hx   Nebs scheduled and PRN     Bipap weaned off, questionable if is this is due to volume overload, patient appears dry and CXR not grossly overloaded. Repeat cxr concerning for pna, started on rocephin and azithromycin. Wean o2 as tolerated. Wean steroids.         Multiple stable closed lateral compression fractures of pelvis  Ortho consulted-conservative management   PT/OT      Hypokalemia  Patient has hypokalemia which is Acute and currently controlled. Most recent potassium levels reviewed-   Lab Results   Component Value Date    K 3.7 02/20/2024   . Will continue potassium replacement per protocol and recheck repeat levels after replacement completed.     Encephalopathy, toxic  From 2 mg IV morphine  Got IV narcan twice   Head CT repeated did not show anything new or acute findings   Mentation improved following Narcan.   Avoid narcotics   Repeat UA not consistent with UTI        VTE Risk Mitigation (From admission, onward)           Ordered     enoxaparin injection 30 mg  Daily        Note to Pharmacy: Ht:    Wt: 49.9 kg (110 lb)  Estimated Creatinine Clearance: 23.6 mL/min (based on SCr of 1.2 mg/dL).  Body mass index is 18.88 kg/m².    02/15/24 0054     IP VTE HIGH RISK PATIENT  Once         02/15/24 0052     Place sequential compression device  Until discontinued          02/15/24 0052                    Discharge Planning   KENNY: 2/22/2024     Code Status: DNR   Is the patient medically ready for discharge?:     Reason for patient still in hospital (select all that apply): Patient trending condition  Discharge Plan A: Skilled Nursing Facility   Discharge Delays: None known at this time              Estuardo Campbell MD  Department of Hospital Medicine   [facility]

## 2024-02-20 NOTE — NURSING
..Nurses Note -- 4 Eyes      2/20/2024   7:54 AM      Skin assessed during: Q Shift Change      [x] No Altered Skin Integrity Present    [x]Prevention Measures Documented      [] Yes- Altered Skin Integrity Present or Discovered   [] LDA Added if Not in Epic (Describe Wound)   [] New Altered Skin Integrity was Present on Admit and Documented in LDA   [] Wound Image Taken    Wound Care Consulted? No    Attending Nurse:  Shelley Gimenez RN    Second RN/Staff Member:   Terri Rushing RN

## 2024-02-20 NOTE — PT/OT/SLP PROGRESS
Occupational Therapy   Treatment    Name: Marietta Gates  MRN: [id number]  Admitting Diagnosis:  Closed fracture of left hip       Recommendations:     Discharge Recommendations: Moderate Intensity Therapy  Discharge Equipment Recommendations:  bedside commode, walker, rolling  Barriers to discharge:       Assessment:     Marietta Gates is a 89 y.o. female with a medical diagnosis of Closed fracture of left hip.  Pt agreeable to OT/PT co-tx session this AM. Performance deficits affecting function are weakness, impaired endurance, impaired self care skills, gait instability, impaired functional mobility, impaired balance, impaired cognition, decreased coordination, decreased upper extremity function, decreased lower extremity function, decreased safety awareness, pain, impaired cardiopulmonary response to activity, orthopedic precautions.     Rehab Prognosis:  Fair; patient would benefit from acute skilled OT services to address these deficits and reach maximum level of function.       Plan:     Patient to be seen 5 x/week to address the above listed problems via self-care/home management, therapeutic activities, therapeutic exercises  Plan of Care Expires: 03/16/24  Plan of Care Reviewed with: patient, daughter    Subjective     Chief Complaint: none stated  Patient/Family Comments/goals: none stated  Pain/Comfort:  Pain Rating 1: 0/10    Objective:     Communicated with: nursing prior to session.  Patient found HOB elevated with bed alarm, blood pressure cuff, oxygen, PureWick, pulse ox (continuous), telemetry upon OT entry to room.    General Precautions: Standard, fall    Orthopedic Precautions:LLE weight bearing as tolerated  Braces: N/A  Respiratory Status: High flow, flow 3 L/min     Occupational Performance:     Bed Mobility:    Patient completed Supine to Sit with maximal assistance and 2 persons  Patient completed Sit to Supine with maximal assistance and 2 persons     Functional  Mobility/Transfers:  Patient completed Sit <> Stand Transfer with moderate assistance, maximal assistance, and of 2 persons  with  rolling walker   Functional Mobility: see PT note    Activities of Daily Living:  Feeding:  minimum assistance [place] assist to hold pudding cup with L hand while pt feed self with R hand independently with no spillage noted; Pt and daughter encouraged for pt to feed self independently with pillow under BUE's to support arms to minimize fatigue while self feeding  Lower Body Dressing: total assistance to don socks bed level  Toileting: ryan      Treatment & Education:  Pt educated on role of OT/POC, importance of OOB/EOB activity, use of call bell, and safety during ADLs, transfers, and functional mobility.    Patient left HOB elevated with all lines intact, call button in reach, bed alarm on, and daughter and sitter present    GOALS:   Multidisciplinary Problems       Occupational Therapy Goals          Problem: Occupational Therapy    Goal Priority Disciplines Outcome Interventions   Occupational Therapy Goal     OT, PT/OT Ongoing, Progressing    Description: Goals to be met by: 3/15/24     Patient will increase functional independence with ADLs by performing:    UE Dressing with Minimal Assistance.  LE Dressing with Moderate Assistance.  Grooming while seated with Stand-by Assistance.  Toileting from toilet with Minimal Assistance for hygiene and clothing management.   Toilet transfer to toilet with Minimal Assistance.                         Time Tracking:     OT Date of Treatment: 02/20/24  OT Start Time: 1053  OT Stop Time: 1116  OT Total Time (min): 23 min    Billable Minutes:Self Care/Home Management 10  Therapeutic Activity 13    OT/DOROTA: OT          2/20/2024

## 2024-02-20 NOTE — NURSING
Pt only output approx 300cc of urine earlier in shift in purewick. Pt states she does not feel the need to urinate. Bladder scanned performed showing 625cc. Pt assisted to bedside commode with scant output. In & out performed per protocol. 575cc output with in & out. Purewick change and reapplied.

## 2024-02-20 NOTE — PLAN OF CARE
Problem: Occupational Therapy  Goal: Occupational Therapy Goal  Description: Goals to be met by: 3/15/24     Patient will increase functional independence with ADLs by performing:    UE Dressing with Minimal Assistance.  LE Dressing with Moderate Assistance.  Grooming while seated with Stand-by Assistance.  Toileting from toilet with Minimal Assistance for hygiene and clothing management.   Toilet transfer to toilet with Minimal Assistance.    Outcome: Ongoing, Progressing

## 2024-02-20 NOTE — ASSESSMENT & PLAN NOTE
ABG 2/17:  pH 7.448, PCO2 30.5, PO2 49, HCO3 21.1, sat 86 on 10L nasal cannula  CXR with left lower lobe atelectasis and tiny left pleural effusion   IV lasix dc/ed  IV solumedrol started given COPD hx   Nebs scheduled and PRN     Bipap weaned off, questionable if is this is due to volume overload, patient appears dry and CXR not grossly overloaded. Repeat cxr concerning for pna, started on rocephin and azithromycin. Wean o2 as tolerated. Wean steroids.

## 2024-02-20 NOTE — PLAN OF CARE
02/20/24 0749   Patient Assessment/Suction   Level of Consciousness (AVPU) alert   Respiratory Effort Normal;Unlabored   Expansion/Accessory Muscles/Retractions no use of accessory muscles   All Lung Fields Breath Sounds diminished;wheezes, expiratory   Rhythm/Pattern, Respiratory pattern regular   Cough Frequency infrequent   Cough Type nonproductive   PRE-TX-O2   Device (Oxygen Therapy) high flow nasal cannula   $ Is the patient on Low Flow Oxygen? Yes   Flow (L/min) 5   Oxygen Concentration (%) 40   SpO2 99 %   Pulse Oximetry Type Continuous   $ Pulse Oximetry - Multiple Charge Pulse Oximetry - Multiple   Pulse 91   Resp (!) 22   Aerosol Therapy   $ Aerosol Therapy Charges Aerosol Treatment   Daily Review of Necessity (SVN) completed   Respiratory Treatment Status (SVN) given   Treatment Route (SVN) mask;oxygen   Patient Position (SVN) HOB elevated   Post Treatment Assessment (SVN) breath sounds improved   Signs of Intolerance (SVN) none   Breath Sounds Post-Respiratory Treatment   Throughout All Fields Post-Treatment All Fields   Throughout All Fields Post-Treatment aeration increased   Post-treatment Heart Rate (beats/min) 91   Post-treatment Resp Rate (breaths/min) 21   General Safety Checklist   Safety Promotion/Fall Prevention side rails raised   Respiratory Interventions   Cough And Deep Breathing done with encouragement   Ready to Wean/Extubation Screen   FIO2<=50 (chart decimal) 0.4   Preset CPAP/BiPAP Settings   Mode Of Delivery BiPAP;Standby   $ CPAP/BiPAP Daily Charge BiPAP/CPAP Daily   $ Is patient using? Other (see comments)  (QHS)   Education   $ Education Bronchodilator;15 min

## 2024-02-20 NOTE — SUBJECTIVE & OBJECTIVE
Interval History: Pt arouses to verbal stimuli. Weaned o2 to 3L NC. More alert. Remain disoriented.      Review of Systems   Respiratory:  Negative for cough and shortness of breath.    Cardiovascular:  Negative for chest pain.     Objective:     Vital Signs (Most Recent):  Temp: 97.5 °F (36.4 °C) (02/20/24 1125)  Pulse: 92 (02/20/24 1107)  Resp: (!) 26 (02/20/24 1107)  BP: (!) 158/110 (02/20/24 1107)  SpO2: 95 % (02/20/24 1107) Vital Signs (24h Range):  Temp:  [97.3 °F (36.3 °C)-97.8 °F (36.6 °C)] 97.5 °F (36.4 °C)  Pulse:  [] 92  Resp:  [18-38] 26  SpO2:  [88 %-100 %] 95 %  BP: (119-178)/() 158/110     Weight: 54.4 kg (120 lb)  Body mass index is 21.26 kg/m².    Intake/Output Summary (Last 24 hours) at 2/20/2024 1215  Last data filed at 2/20/2024 0910  Gross per 24 hour   Intake 944 ml   Output 875 ml   Net 69 ml         Physical Exam  Vitals and nursing note reviewed.   Constitutional:       General: She is not in acute distress.  HENT:      Mouth/Throat:      Mouth: Mucous membranes are moist.   Cardiovascular:      Rate and Rhythm: Normal rate and regular rhythm.      Heart sounds: Normal heart sounds.   Pulmonary:      Effort: No respiratory distress.   Abdominal:      Palpations: Abdomen is soft.   Musculoskeletal:         General: Tenderness present.   Skin:     General: Skin is warm and dry.   Neurological:      Mental Status: She is alert. She is disoriented.   Psychiatric:         Mood and Affect: Mood normal.             Significant Labs: All pertinent labs within the past 24 hours have been reviewed.  CBC:   Recent Labs   Lab 02/19/24 0647 02/20/24  0401   WBC 11.26 10.83   HGB 12.9 13.0   HCT 39.4 39.5    344     CMP:   Recent Labs   Lab 02/19/24  0647 02/19/24 2007 02/20/24  0401     --  138   K 3.3* 3.9 3.7     --  98   CO2 25  --  31*   *  --  195*   BUN 55*  --  69*   CREATININE 1.0  --  1.4   CALCIUM 9.0  --  9.0   PROT 7.8  --  7.4   ALBUMIN 3.5  --  3.4*    BILITOT 0.6  --  0.6   ALKPHOS 52*  --  47*   AST 16  --  23   ALT 9*  --  14   ANIONGAP 15  --  9       Significant Imaging: I have reviewed all pertinent imaging results/findings within the past 24 hours.

## 2024-02-20 NOTE — PLAN OF CARE
Spoke with Margaret at [facility] [phone] to inform of expected d/c date of 2/22/24 and to inquire if she needed any further information sent to her.  Margaret states she will review the patient's referral again, pull any new information she needs directly from [facility], and will let this CM know if anything further is needed. Margaret states she will respond in careport with any needs.

## 2024-02-20 NOTE — ASSESSMENT & PLAN NOTE
Patient has hypokalemia which is Acute and currently controlled. Most recent potassium levels reviewed-   Lab Results   Component Value Date    K 3.7 02/20/2024   . Will continue potassium replacement per protocol and recheck repeat levels after replacement completed.

## 2024-02-20 NOTE — PT/OT/SLP PROGRESS
Physical Therapy Treatment    Patient Name:  Marietta Gates   MRN:  [id number]    Recommendations:     Discharge Recommendations: Moderate Intensity Therapy  Discharge Equipment Recommendations: to be determined by next level of care  Barriers to discharge:   2 person assist for transfers    Assessment:     Marietta Gates is a 89 y.o. female admitted with a medical diagnosis of Closed fracture of left hip.  She presents with the following impairments/functional limitations: weakness, impaired endurance, impaired self care skills, impaired functional mobility, gait instability, impaired balance, impaired cognition, decreased upper extremity function, decreased lower extremity function, decreased safety awareness, pain, impaired cardiopulmonary response to activity, orthopedic precautions. Patient's daughter in law states she was premedicated with Tylenol and is agreeable to PT/OT co-treatment. She requires MaxA x2 for supine to sit and Mod-MaxA x2 for sit to stand with RW x2 trials and VC for upright posture. On second trial of standing patient able to take 4 side steps to the left with PT providing MaxA to advance LLE and patient able to weight shift heel to toe to advance RLE. She returned to bed with bed alarm on, daughter in law present, and RN notified.     Rehab Prognosis: Fair; patient would benefit from acute skilled PT services to address these deficits and reach maximum level of function.    Recent Surgery: * No surgery found *      Plan:     During this hospitalization, patient to be seen 6 x/week to address the identified rehab impairments via gait training, therapeutic activities, therapeutic exercises and progress toward the following goals:    Plan of Care Expires:  03/15/24    Subjective     Chief Complaint: none verbalized   Patient/Family Comments/goals: SNF  Pain/Comfort:  Pain Rating 1:  (no pain verbalized)  Pain Addressed 1: Pre-medicate for activity      Objective:     Communicated  with SHANNAN Curiel prior to session.  Patient found HOB elevated with bed alarm, blood pressure cuff, oxygen, PureWick, pulse ox (continuous), telemetry upon PT entry to room.     General Precautions: Standard, fall, respiratory  Orthopedic Precautions: LLE weight bearing as tolerated  Braces: N/A  Respiratory Status: High flow, flow 3 L/min     Functional Mobility:  Bed Mobility:     Supine to Sit: maximal assistance and of 2 persons  Transfers:     Sit to Stand:  moderate assistance, maximal assistance, and of 2 persons with rolling walker  Gait: 4 side steps to the left with PT providing MaxA to advance LLE and patient able to weight shift heel to toe to advance RLE with RW and VC for upright posture      AM-PAC 6 CLICK MOBILITY  Turning over in bed (including adjusting bedclothes, sheets and blankets)?: 2  Sitting down on and standing up from a chair with arms (e.g., wheelchair, bedside commode, etc.): 2  Moving from lying on back to sitting on the side of the bed?: 2  Moving to and from a bed to a chair (including a wheelchair)?: 1  Need to walk in hospital room?: 1  Climbing 3-5 steps with a railing?: 1  Basic Mobility Total Score: 9       Treatment & Education:  Patient was educated on the importance of OOB activity and functional mobility to negate negative effects of prolonged bed rest during hospitalization, safe transfers and ambulation, LLE WBAT, and D/C planning     Patient left HOB elevated with all lines intact, call button in reach, bed alarm on, RN notified, and DIL and OT present..    GOALS:   Multidisciplinary Problems       Physical Therapy Goals          Problem: Physical Therapy    Goal Priority Disciplines Outcome Goal Variances Interventions   Physical Therapy Goal     PT, PT/OT Ongoing, Progressing     Description: Goals to be met by: 3/15/2024     Patient will increase functional independence with mobility by performin. Supine to sit with Moderate Assistance  2. Sit to supine with  Moderate Assistance  3. Bed to chair transfer with Moderate Assistance using No Assistive Device  4. Gait  x 10  feet with Minimal Assistance using Rolling Walker.                          Time Tracking:     PT Received On: 02/20/24  PT Start Time: 1052     PT Stop Time: 1109  PT Total Time (min): 17 min     Billable Minutes: Therapeutic Activity 17    Treatment Type: Treatment  PT/PTA: PT     Number of PTA visits since last PT visit: 0     02/20/2024

## 2024-02-20 NOTE — ASSESSMENT & PLAN NOTE
Advance Care Planning     Date: 02/17/2024    [facility]  I engaged the family in a voluntary conversation about advance care planning and we specifically addressed what the goals of care would be moving forward, in light of the patient's change in clinical status, specifically patient's worsening respiratory status.  We did specifically address the patient's likely prognosis, which is poor.  We explored the patient's values and preferences for future care.  The family endorses that what is most important right now is to focus on improvement in condition but with limits to invasive therapies and comfort and QOL     Accordingly, we have decided that the best plan to meet the patient's goals includes continuing with treatment and no further escalation in treatment.      I did explain the role for hospice care at this stage of the patient's illness, including its ability to help the patient live with the best quality of life possible.  We will not be making a hospice referral at this time, but may consult hospice soon if patient continues to deteriorate.      I spent a total of 25 minutes engaging the patient in this advance care planning discussion.

## 2024-02-20 NOTE — PLAN OF CARE
Problem: Adult Inpatient Plan of Care  Goal: Plan of Care Review  Outcome: Ongoing, Progressing  Goal: Patient-Specific Goal (Individualized)  Outcome: Ongoing, Progressing  Goal: Absence of Hospital-Acquired Illness or Injury  Outcome: Ongoing, Progressing  Goal: Optimal Comfort and Wellbeing  Outcome: Ongoing, Progressing  Goal: Readiness for Transition of Care  Outcome: Ongoing, Progressing     Problem: Infection  Goal: Absence of Infection Signs and Symptoms  Outcome: Ongoing, Progressing     Problem: Skin Injury Risk Increased  Goal: Skin Health and Integrity  Outcome: Ongoing, Progressing     Problem: Fall Injury Risk  Goal: Absence of Fall and Fall-Related Injury  Outcome: Ongoing, Progressing     Problem: Gas Exchange Impaired  Goal: Optimal Gas Exchange  Outcome: Ongoing, Progressing

## 2024-02-21 VITALS
WEIGHT: 120 LBS | RESPIRATION RATE: 17 BRPM | SYSTOLIC BLOOD PRESSURE: 134 MMHG | HEIGHT: 63 IN | HEART RATE: 70 BPM | OXYGEN SATURATION: 97 % | BODY MASS INDEX: 21.26 KG/M2 | DIASTOLIC BLOOD PRESSURE: 62 MMHG | TEMPERATURE: 98 F

## 2024-02-21 PROBLEM — R09.02 HYPOXEMIA: Status: RESOLVED | Noted: 2024-02-17 | Resolved: 2024-02-21

## 2024-02-21 PROBLEM — E87.6 HYPOKALEMIA: Status: RESOLVED | Noted: 2024-02-15 | Resolved: 2024-02-21

## 2024-02-21 PROBLEM — S32.82XA MULTIPLE STABLE CLOSED LATERAL COMPRESSION FRACTURES OF PELVIS: Status: RESOLVED | Noted: 2024-02-15 | Resolved: 2024-02-21

## 2024-02-21 PROBLEM — G92.9 ENCEPHALOPATHY, TOXIC: Status: RESOLVED | Noted: 2024-02-15 | Resolved: 2024-02-21

## 2024-02-21 LAB
ALBUMIN SERPL BCP-MCNC: 3.1 G/DL (ref 3.5–5.2)
ALP SERPL-CCNC: 40 U/L (ref 55–135)
ALT SERPL W/O P-5'-P-CCNC: 15 U/L (ref 10–44)
ANION GAP SERPL CALC-SCNC: 10 MMOL/L (ref 8–16)
AST SERPL-CCNC: 24 U/L (ref 10–40)
BASOPHILS # BLD AUTO: 0.08 K/UL (ref 0–0.2)
BASOPHILS NFR BLD: 0.7 % (ref 0–1.9)
BILIRUB SERPL-MCNC: 0.5 MG/DL (ref 0.1–1)
BUN SERPL-MCNC: 55 MG/DL (ref 10–30)
CALCIUM SERPL-MCNC: 8.3 MG/DL (ref 8.7–10.5)
CHLORIDE SERPL-SCNC: 99 MMOL/L (ref 95–110)
CO2 SERPL-SCNC: 27 MMOL/L (ref 23–29)
CREAT SERPL-MCNC: 1.1 MG/DL (ref 0.5–1.4)
DIFFERENTIAL METHOD BLD: ABNORMAL
EOSINOPHIL # BLD AUTO: 0 K/UL (ref 0–0.5)
EOSINOPHIL NFR BLD: 0 % (ref 0–8)
ERYTHROCYTE [DISTWIDTH] IN BLOOD BY AUTOMATED COUNT: 13.1 % (ref 11.5–14.5)
EST. GFR  (NO RACE VARIABLE): 47.1 ML/MIN/1.73 M^2
GLUCOSE SERPL-MCNC: 159 MG/DL (ref 70–110)
HCT VFR BLD AUTO: 40.5 % (ref 37–48.5)
HGB BLD-MCNC: 13 G/DL (ref 12–16)
IMM GRANULOCYTES # BLD AUTO: 0.18 K/UL (ref 0–0.04)
IMM GRANULOCYTES NFR BLD AUTO: 1.5 % (ref 0–0.5)
LYMPHOCYTES # BLD AUTO: 0.7 K/UL (ref 1–4.8)
LYMPHOCYTES NFR BLD: 6 % (ref 18–48)
MAGNESIUM SERPL-MCNC: 2.4 MG/DL (ref 1.6–2.6)
MCH RBC QN AUTO: 31.6 PG (ref 27–31)
MCHC RBC AUTO-ENTMCNC: 32.1 G/DL (ref 32–36)
MCV RBC AUTO: 98 FL (ref 82–98)
MONOCYTES # BLD AUTO: 0.7 K/UL (ref 0.3–1)
MONOCYTES NFR BLD: 6.1 % (ref 4–15)
NEUTROPHILS # BLD AUTO: 10.5 K/UL (ref 1.8–7.7)
NEUTROPHILS NFR BLD: 85.7 % (ref 38–73)
NRBC BLD-RTO: 0 /100 WBC
PLATELET # BLD AUTO: 247 K/UL (ref 150–450)
PMV BLD AUTO: 10.9 FL (ref 9.2–12.9)
POTASSIUM SERPL-SCNC: 4.3 MMOL/L (ref 3.5–5.1)
PROT SERPL-MCNC: 6.4 G/DL (ref 6–8.4)
RBC # BLD AUTO: 4.12 M/UL (ref 4–5.4)
SODIUM SERPL-SCNC: 136 MMOL/L (ref 136–145)
WBC # BLD AUTO: 12.2 K/UL (ref 3.9–12.7)

## 2024-02-21 PROCEDURE — 83735 ASSAY OF MAGNESIUM: CPT | Performed by: NURSE PRACTITIONER

## 2024-02-21 PROCEDURE — 99900035 HC TECH TIME PER 15 MIN (STAT)

## 2024-02-21 PROCEDURE — 97530 THERAPEUTIC ACTIVITIES: CPT

## 2024-02-21 PROCEDURE — 99900031 HC PATIENT EDUCATION (STAT)

## 2024-02-21 PROCEDURE — 25000242 PHARM REV CODE 250 ALT 637 W/ HCPCS: Performed by: NURSE PRACTITIONER

## 2024-02-21 PROCEDURE — 25000003 PHARM REV CODE 250: Performed by: INTERNAL MEDICINE

## 2024-02-21 PROCEDURE — 63600175 PHARM REV CODE 636 W HCPCS: Performed by: STUDENT IN AN ORGANIZED HEALTH CARE EDUCATION/TRAINING PROGRAM

## 2024-02-21 PROCEDURE — 94640 AIRWAY INHALATION TREATMENT: CPT

## 2024-02-21 PROCEDURE — 92526 ORAL FUNCTION THERAPY: CPT

## 2024-02-21 PROCEDURE — 30200315 PPD INTRADERMAL TEST REV CODE 302: Performed by: STUDENT IN AN ORGANIZED HEALTH CARE EDUCATION/TRAINING PROGRAM

## 2024-02-21 PROCEDURE — 25000003 PHARM REV CODE 250: Performed by: STUDENT IN AN ORGANIZED HEALTH CARE EDUCATION/TRAINING PROGRAM

## 2024-02-21 PROCEDURE — 86580 TB INTRADERMAL TEST: CPT | Performed by: STUDENT IN AN ORGANIZED HEALTH CARE EDUCATION/TRAINING PROGRAM

## 2024-02-21 PROCEDURE — 94660 CPAP INITIATION&MGMT: CPT

## 2024-02-21 PROCEDURE — 80053 COMPREHEN METABOLIC PANEL: CPT | Performed by: NURSE PRACTITIONER

## 2024-02-21 PROCEDURE — 97535 SELF CARE MNGMENT TRAINING: CPT

## 2024-02-21 PROCEDURE — 27100171 HC OXYGEN HIGH FLOW UP TO 24 HOURS

## 2024-02-21 PROCEDURE — 36415 COLL VENOUS BLD VENIPUNCTURE: CPT | Performed by: NURSE PRACTITIONER

## 2024-02-21 PROCEDURE — 63700000 PHARM REV CODE 250 ALT 637 W/O HCPCS: Performed by: STUDENT IN AN ORGANIZED HEALTH CARE EDUCATION/TRAINING PROGRAM

## 2024-02-21 PROCEDURE — 63600175 PHARM REV CODE 636 W HCPCS: Performed by: NURSE PRACTITIONER

## 2024-02-21 PROCEDURE — 25000003 PHARM REV CODE 250: Performed by: NURSE PRACTITIONER

## 2024-02-21 PROCEDURE — 94799 UNLISTED PULMONARY SVC/PX: CPT

## 2024-02-21 PROCEDURE — 94761 N-INVAS EAR/PLS OXIMETRY MLT: CPT

## 2024-02-21 PROCEDURE — 85025 COMPLETE CBC W/AUTO DIFF WBC: CPT | Performed by: NURSE PRACTITIONER

## 2024-02-21 RX ORDER — BUDESONIDE AND FORMOTEROL FUMARATE DIHYDRATE 80; 4.5 UG/1; UG/1
2 AEROSOL RESPIRATORY (INHALATION) 2 TIMES DAILY
Qty: 10.2 G | Refills: 0 | Status: SHIPPED | OUTPATIENT
Start: 2024-02-21 | End: 2025-02-20

## 2024-02-21 RX ORDER — PREDNISONE 20 MG/1
40 TABLET ORAL DAILY
Status: DISCONTINUED | OUTPATIENT
Start: 2024-02-21 | End: 2024-02-21 | Stop reason: HOSPADM

## 2024-02-21 RX ORDER — POLYETHYLENE GLYCOL 3350 17 G/17G
17 POWDER, FOR SOLUTION ORAL DAILY
Qty: 10 EACH | Refills: 0 | Status: SHIPPED | OUTPATIENT
Start: 2024-02-22 | End: 2024-03-03

## 2024-02-21 RX ORDER — ONDANSETRON 4 MG/1
4 TABLET, ORALLY DISINTEGRATING ORAL ONCE
Status: COMPLETED | OUTPATIENT
Start: 2024-02-21 | End: 2024-02-21

## 2024-02-21 RX ORDER — ONDANSETRON 4 MG/1
4 TABLET, ORALLY DISINTEGRATING ORAL ONCE
Status: DISCONTINUED | OUTPATIENT
Start: 2024-02-21 | End: 2024-02-21 | Stop reason: HOSPADM

## 2024-02-21 RX ORDER — MUPIROCIN 20 MG/G
OINTMENT TOPICAL 2 TIMES DAILY
Status: DISCONTINUED | OUTPATIENT
Start: 2024-02-21 | End: 2024-02-21 | Stop reason: HOSPADM

## 2024-02-21 RX ORDER — POTASSIUM CHLORIDE 750 MG/1
10 CAPSULE, EXTENDED RELEASE ORAL DAILY
Qty: 30 CAPSULE | Refills: 0 | Status: SHIPPED | OUTPATIENT
Start: 2024-02-22 | End: 2024-03-23

## 2024-02-21 RX ORDER — CEFDINIR 300 MG/1
300 CAPSULE ORAL 2 TIMES DAILY
Qty: 6 CAPSULE | Refills: 0 | Status: SHIPPED | OUTPATIENT
Start: 2024-02-21 | End: 2024-02-24

## 2024-02-21 RX ORDER — ACETAMINOPHEN 500 MG
1000 TABLET ORAL EVERY 8 HOURS PRN
Qty: 30 TABLET | Refills: 0 | Status: SHIPPED | OUTPATIENT
Start: 2024-02-21 | End: 2024-03-02

## 2024-02-21 RX ORDER — AZITHROMYCIN 250 MG/1
250 TABLET, FILM COATED ORAL DAILY
Qty: 3 TABLET | Refills: 0 | Status: SHIPPED | OUTPATIENT
Start: 2024-02-22 | End: 2024-02-25

## 2024-02-21 RX ORDER — PREDNISONE 20 MG/1
40 TABLET ORAL DAILY
Qty: 6 TABLET | Refills: 0 | Status: SHIPPED | OUTPATIENT
Start: 2024-02-22 | End: 2024-02-25

## 2024-02-21 RX ADMIN — ONDANSETRON 4 MG: 4 TABLET, ORALLY DISINTEGRATING ORAL at 02:02

## 2024-02-21 RX ADMIN — KETOROLAC TROMETHAMINE 15 MG: 30 INJECTION, SOLUTION INTRAMUSCULAR; INTRAVENOUS at 02:02

## 2024-02-21 RX ADMIN — ACETAMINOPHEN 1000 MG: 500 TABLET ORAL at 11:02

## 2024-02-21 RX ADMIN — ARFORMOTEROL TARTRATE 15 MCG: 15 SOLUTION RESPIRATORY (INHALATION) at 07:02

## 2024-02-21 RX ADMIN — AMLODIPINE BESYLATE 5 MG: 5 TABLET ORAL at 10:02

## 2024-02-21 RX ADMIN — BUPROPION HYDROCHLORIDE 300 MG: 150 TABLET, EXTENDED RELEASE ORAL at 10:02

## 2024-02-21 RX ADMIN — HYDRALAZINE HYDROCHLORIDE 10 MG: 20 INJECTION INTRAMUSCULAR; INTRAVENOUS at 06:02

## 2024-02-21 RX ADMIN — AZITHROMYCIN DIHYDRATE 250 MG: 250 TABLET ORAL at 10:02

## 2024-02-21 RX ADMIN — POTASSIUM CHLORIDE 10 MEQ: 750 CAPSULE, EXTENDED RELEASE ORAL at 10:02

## 2024-02-21 RX ADMIN — ONDANSETRON 4 MG: 2 INJECTION INTRAMUSCULAR; INTRAVENOUS at 12:02

## 2024-02-21 RX ADMIN — BUDESONIDE 0.25 MG: 0.5 INHALANT RESPIRATORY (INHALATION) at 07:02

## 2024-02-21 RX ADMIN — PREDNISONE 40 MG: 20 TABLET ORAL at 10:02

## 2024-02-21 RX ADMIN — TUBERCULIN PURIFIED PROTEIN DERIVATIVE 5 UNITS: 5 INJECTION INTRADERMAL at 11:02

## 2024-02-21 NOTE — PT/OT/SLP PROGRESS
Occupational Therapy   Treatment    Name: Marietta Gates  MRN: [id number]  Admitting Diagnosis:  Closed fracture of left hip       Recommendations:     Discharge Recommendations: Moderate Intensity Therapy  Discharge Equipment Recommendations:  bedside commode, walker, rolling  Barriers to discharge:       Assessment:     Marietta Gates is a 89 y.o. female with a medical diagnosis of Closed fracture of left hip.  She presents with Performance deficits affecting function are weakness, impaired endurance, impaired self care skills, gait instability, impaired functional mobility, impaired balance, impaired cognition, decreased coordination, decreased upper extremity function, decreased lower extremity function, decreased safety awareness, pain, impaired cardiopulmonary response to activity, orthopedic precautions.     Pt c/o pain in L pelvic area with movement, no pain at rest. Pt max A supine<>sit, sat EOB ~20 minutes with min-mod A for balance. G/hygiene Min A seated supported on EOB. Pt scooted laterally  toward HOB with Mod A seated on EOB. Sit<>stand Mod A to RW-heavily flexed forward posture; able to step with RLE laterally with Max A with RW-poor balance and posture. Continue with OT POC.     Rehab Prognosis:  Fair; patient would benefit from acute skilled OT services to address these deficits and reach maximum level of function.       Plan:     Patient to be seen 5 x/week to address the above listed problems via self-care/home management, therapeutic activities, therapeutic exercises  Plan of Care Expires: 03/16/24  Plan of Care Reviewed with: patient, daughter    Subjective     Chief Complaint: L pelvic pain with movement, fear of falling coming to sit EOB.  Patient/Family Comments/goals: agreeable to OT with min prompting.  Pain/Comfort:  Pain Rating 1:  (none at rest, pain with movement)  Location - Side 1: Left  Location - Orientation 1: generalized  Location 1:  (pelvic)  Pain Addressed 1:  Reposition, Distraction, Cessation of Activity  Pain Rating Post-Intervention 1:  (no pain at rest)    Objective:     Communicated with: nurse prior to session.  Patient found HOB elevated with bed alarm, blood pressure cuff, pulse ox (continuous), oxygen, telemetry, herrera catheter upon OT entry to room.    General Precautions: Standard, fall, aspiration, diabetic, vision impaired (hx L eye blindness)    Orthopedic Precautions:LLE weight bearing as tolerated  Braces: N/A  Respiratory Status: Nasal cannula, flow 4 L/min     Occupational Performance:     Bed Mobility:    Patient completed Rolling/Turning to Right with maximal assistance and with side rail  Patient completed Scooting/Bridging with total assistance and 2 persons  Patient completed Supine to Sit with maximal assistance  Patient completed Sit to Supine with maximal assistance     Functional Mobility/Transfers:  Patient completed Sit <> Stand Transfer with moderate assistance  with  rolling walker and extra time and effort, poor posture-flexed forward   Functional Mobility: seated lateral scooting mod A  ~3 scoots toward HOB; ambulated 1 step Max A with RW stepping laterally with RLE-unable to step with LLE.    Activities of Daily Living:  Grooming: minimum assistance to comb hair and brush teeth, SBA wipe face seated EOB with min -mod  sitting balance.  Lower Body Dressing: total assistance for socks in bed      [facility] 6 Click ADL: 16    Treatment & Education:  Purpose of OT and POC  Pt. seen for self care retraining and functional mobility retraining with adapted techniques and modifications as stated above.  All questions and concerns addressed within scope.  Pt instructed in fall prevention strategies.  Pt sat EOB, worked on static siting balance in midline, initially min A-progressed to CGA then after ~10 min became more fatigued needing increased assistance -mod A.   Family training and education initiated with daughter in above areas.  Pt sat ~20 min  EOB until fatigued.      Patient left right sidelying with all lines intact, call button in reach, bed alarm on, and sitter and daughter present    GOALS:   Multidisciplinary Problems       Occupational Therapy Goals          Problem: Occupational Therapy    Goal Priority Disciplines Outcome Interventions   Occupational Therapy Goal     OT, PT/OT Ongoing, Progressing    Description: Goals to be met by: 3/15/24     Patient will increase functional independence with ADLs by performing:    UE Dressing with Minimal Assistance.  LE Dressing with Moderate Assistance.  Grooming while seated with Stand-by Assistance.  Toileting from toilet with Minimal Assistance for hygiene and clothing management.   Toilet transfer to toilet with Minimal Assistance.                         Time Tracking:     OT Date of Treatment: 02/21/24  OT Start Time: 1455  OT Stop Time: 1525  OT Total Time (min): 30 min    Billable Minutes:Self Care/Home Management 15  Therapeutic Activity 15  Total Time 30    OT/DOROTA: OT          2/21/2024

## 2024-02-21 NOTE — PT/OT/SLP PROGRESS
Speech Language Pathology Treatment    Patient Name:  Marietta Gates   MRN:  [id number]  Admitting Diagnosis: Closed fracture of left hip    Recommendations:                 General Recommendations:  Dysphagia therapy  Diet recommendations:  Minced & Moist Diet - IDDSI Level 5, Thin liquids - IDDSI Level 0. Gravy on all meats. No dry/crumbly foods.   Aspiration Precautions:  slow pace, respites as needed, upright with all intake, assistance with meals, only feed when alert, Meds whole 1 at a time, Small bites/sips, alternate bites/sips, finish meal with liquids, and Wear oxygen during intake   General Precautions: Standard, aspiration, fall  Communication strategies:  go to room if call light pushed    Assessment:   Pt consumed breakfast tray with mild oral prep dysphagia and no overt s/s airway comprise. Aspiration risk 2° COPD and current respiratory status. Continue current diet with use of stated aspiration precautions.     Subjective     No c/o  Agreed to breakfast.  Daughter reports poor appetite    Pain/Comfort:  Pain Rating 1: 0/10    Respiratory Status: Nasal cannula, flow 5 L/min    Objective:   Pt seen with breakfast tray. She is alert and cooperative with daughter at bedside. Daughter reports poor appetite. Agreed to breakfast. Pt consumed scrambled eggs and peaches with mildly prolonged oral prep and mild scattered oral residue. Pt able to clear residue with alteration of bites/sips. Yogurt and oatmeal were consumed without difficulty. Pt and daughter educated on aspiration precautions and importance of compliance. Daughter verbalized understanding.     Has the patient been evaluated by SLP for swallowing?   Yes  Keep patient NPO? No   Current Respiratory Status:          Goals:   Multidisciplinary Problems       SLP Goals          Problem: SLP    Goal Priority Disciplines Outcome   SLP Goal     SLP Ongoing, Progressing   Description: 1) The patient with demonstrate use of aspiration precautions  during meals with MIN verbal/visual cues in order to improve carryover of compensatory swallow strategies.  2) Consume Minced & Moist (IDDSI 5) textures and thin liquids (IDDSI 0) with functional oral phase and no overt s/s penetration/aspiration.  3) The patient/family will verbalize rationale for aspiration precautions (COPD/swallowing) following discussion/education across 2 sessions in order to improve self-monitoring and carryover of compensatory swallow strategy                        Plan:     Patient to be seen:  3 x/week   Plan of Care expires:  02/26/24  Plan of Care reviewed with:  patient, daughter   SLP Follow-Up:  Yes       Discharge recommendations:  Moderate Intensity Therapy   Barriers to Discharge:  None    Time Tracking:     SLP Treatment Date:   02/21/24  Speech Start Time:  1047  Speech Stop Time:  1102     Speech Total Time (min):  15 min    Billable Minutes: Treatment Swallowing Dysfunction 15 and Total Time 15    02/21/2024

## 2024-02-21 NOTE — PT/OT/SLP PROGRESS
Physical Therapy      Patient Name:  Marietta Gates   MRN:  [id number]    Patient not seen today secondary to first attempt patient's daughter in law requests patient receive Tylenol prior to mobilization. Second attempt patient's daughter in law states patient needs medicine for nausea with RN notified. Will follow-up 02/22/24.

## 2024-02-21 NOTE — PLAN OF CARE
Spoke with Margaret at [facility] [phone], informed of patient ready to transfer to [facility] for SNF. Margaret states they requested authorization from [facility] on 2/20/24 and as of this writing auth has not been obtained. PPD ordered and will be placed today prior to patient moving to SNF. Will move forward with transfer when auth is obtained. CXR, PASRR, and 142 sent via careport to  per Margaret's request.

## 2024-02-21 NOTE — PLAN OF CARE
Notified by Margaret at [facility] that they have auth. AVS d/c summary, d/c order, diet order and PPD administration record sent via careport to facility per Margaret's request.    Spoke with Paris Pierre ([facility] Power of )  [phone] (Mobile) to inform of patient transferring today to  of [facility]. She expressed understanding of and agreement with this plan.

## 2024-02-21 NOTE — PLAN OF CARE
Patient to transfer to SNF services at [facility] via ambulance transportation to be set up thru the transfer center. Daughter/POTOMY Jorge notified of transfer and she expressed understanding of and agreement with this plan.   Discharge orders and chart reviewed with no further post-acute discharge needs identified at this time.  At this time, patient is cleared for discharge from Case Management standpoint.        02/21/24 1339   Final Note   Assessment Type Final Discharge Note   Anticipated Discharge Disposition SNF   Post-Acute Status   Post-Acute Authorization Placement   Post-Acute Placement Status Set-up Complete/Auth obtained   Discharge Delays None known at this time

## 2024-02-21 NOTE — CARE UPDATE
02/17/24 0737   Patient Assessment/Suction   Level of Consciousness (AVPU) alert   Respiratory Effort Normal;Unlabored   Expansion/Accessory Muscles/Retractions expansion symmetric;no retractions;no use of accessory muscles   All Lung Fields Breath Sounds wheezes, expiratory;diminished   PRE-TX-O2   Device (Oxygen Therapy) Oxymask   $ Is the patient on Low Flow Oxygen? Yes   Flow (L/min) 15   SpO2 (!) 88 %   Pulse Oximetry Type Continuous   $ Pulse Oximetry - Multiple Charge Pulse Oximetry - Multiple   Pulse 91   Resp (!) 22   Aerosol Therapy   $ Aerosol Therapy Charges Aerosol Treatment   Daily Review of Necessity (SVN) completed   Respiratory Treatment Status (SVN) given   Treatment Route (SVN) mask;oxygen   Patient Position (SVN) HOB elevated   Post Treatment Assessment (SVN) increased aeration   Signs of Intolerance (SVN) none   Education   $ Education Bronchodilator;15 min       
   02/17/24 0820   Patient Assessment/Suction   Level of Consciousness (AVPU) alert   PRE-TX-O2   Device (Oxygen Therapy) BIPAP   Oxygen Concentration (%) 100   SpO2 (!) 93 %   Pulse Oximetry Type Continuous   Pulse 85   Resp (!) 33   Ready to Wean/Extubation Screen   FIO2<=50 (chart decimal) (!) 1   Preset CPAP/BiPAP Settings   Mode Of Delivery BiPAP S/T   $ CPAP/BiPAP Daily Charge BiPAP/CPAP Daily   $ Initial CPAP/BiPAP Setup? Yes   $ Is patient using? Yes   Size of Mask Medium/Large   Sized Appropriately? Yes   Equipment Type V60   Airway Device Type medium full face mask   Ipap 16   EPAP (cm H2O) 8   Pressure Support (cm H2O) 8   Set Rate (Breaths/Min) 14   ITime (sec) 1   Rise Time (sec) 3   Patient CPAP/BiPAP Settings   Timed Inspiration (Sec) 1   IPAP Rise Time (sec) 3   RR Total (Breaths/Min) 33   Tidal Volume (mL) 505   VE Minute Ventilation (L/min) 16.6 L/min   Peak Inspiratory Pressure (cm H2O) 16   TiTOT (%) 32   Patient Trigger - ST Mode Only (%) 89       
   02/18/24 0717   Patient Assessment/Suction   Level of Consciousness (AVPU) alert   Respiratory Effort Unlabored   Expansion/Accessory Muscles/Retractions no use of accessory muscles;no retractions;expansion symmetric   All Lung Fields Breath Sounds Anterior:;Lateral:;coarse   Rhythm/Pattern, Respiratory unlabored;pattern regular;depth regular   Skin Integrity   $ Wound Care Tech Time 15 min   Area Observed Left;Behind ear;Cheek;Bridge of nose;Right   Skin Appearance redness blanchable   PRE-TX-O2   Device (Oxygen Therapy) high flow nasal cannula   $ Is the patient on Low Flow Oxygen? Yes   Flow (L/min) 5   SpO2 100 %   Pulse Oximetry Type Continuous   $ Pulse Oximetry - Multiple Charge Pulse Oximetry - Multiple   Pulse 90   Resp 20   Aerosol Therapy   $ Aerosol Therapy Charges Aerosol Treatment   Daily Review of Necessity (SVN) completed   Respiratory Treatment Status (SVN) given   Treatment Route (SVN) in-line   Patient Position (SVN) HOB elevated   Post Treatment Assessment (SVN) breath sounds unchanged   Signs of Intolerance (SVN) none   Preset CPAP/BiPAP Settings   Mode Of Delivery BiPAP;Standby   $ CPAP/BiPAP Daily Charge BiPAP/CPAP Daily   Education   $ Education Bronchodilator;BiPAP;30 min       
   02/18/24 1917   Patient Assessment/Suction   Level of Consciousness (AVPU) alert   Respiratory Effort Normal   Expansion/Accessory Muscles/Retractions no use of accessory muscles   All Lung Fields Breath Sounds coarse   Rhythm/Pattern, Respiratory unlabored   Cough Frequency no cough   Skin Integrity   $ Wound Care Tech Time 15 min   Area Observed Bridge of nose   Skin Appearance redness nonblanchable   PRE-TX-O2   Device (Oxygen Therapy) high flow nasal cannula   $ Is the patient on Low Flow Oxygen? Yes   Flow (L/min) 4   SpO2 (!) 94 %   Pulse Oximetry Type Continuous   $ Pulse Oximetry - Multiple Charge Pulse Oximetry - Multiple   Pulse 96   Resp (!) 23   Aerosol Therapy   $ Aerosol Therapy Charges Aerosol Treatment   Daily Review of Necessity (SVN) completed   Respiratory Treatment Status (SVN) given   Treatment Route (SVN) mask   Patient Position (SVN) semi-De Santiago's   Post Treatment Assessment (SVN) breath sounds unchanged;vital signs unchanged   Signs of Intolerance (SVN) none   Education   $ Education Bronchodilator;15 min   Respiratory Evaluation   $ Care Plan Tech Time 15 min       
   02/19/24 2006   Patient Assessment/Suction   Level of Consciousness (AVPU) alert   Respiratory Effort Normal   Expansion/Accessory Muscles/Retractions no use of accessory muscles   All Lung Fields Breath Sounds diminished   Rhythm/Pattern, Respiratory unlabored   Cough Frequency no cough   Skin Integrity   $ Wound Care Tech Time 15 min   Area Observed Bridge of nose   Skin Appearance without discoloration   PRE-TX-O2   Device (Oxygen Therapy) high flow nasal cannula   $ Is the patient on Low Flow Oxygen? Yes   $ Is the patient on High Flow Oxygen? Yes   Flow (L/min) 5   SpO2 (!) 91 %   Pulse Oximetry Type Continuous   $ Pulse Oximetry - Multiple Charge Pulse Oximetry - Multiple   Pulse 109   Resp 19   Aerosol Therapy   $ Aerosol Therapy Charges Aerosol Treatment   Daily Review of Necessity (SVN) completed   Respiratory Treatment Status (SVN) given   Treatment Route (SVN) mask   Patient Position (SVN) semi-De Santiago's   Post Treatment Assessment (SVN) breath sounds unchanged;vital signs unchanged   Signs of Intolerance (SVN) none   Preset CPAP/BiPAP Settings   Mode Of Delivery BiPAP;Standby   Education   $ Education Bronchodilator;BiPAP;15 min   Respiratory Evaluation   $ Care Plan Tech Time 15 min       
   02/20/24 2008   Patient Assessment/Suction   Level of Consciousness (AVPU) alert   Respiratory Effort Normal   Expansion/Accessory Muscles/Retractions no use of accessory muscles   All Lung Fields Breath Sounds diminished   Rhythm/Pattern, Respiratory unlabored   Cough Frequency no cough   Skin Integrity   $ Wound Care Tech Time 15 min   Area Observed Bridge of nose   Skin Appearance without discoloration   PRE-TX-O2   Device (Oxygen Therapy) high flow nasal cannula   $ Is the patient on Low Flow Oxygen? Yes   $ Is the patient on High Flow Oxygen? Yes  (V60 SB)   Flow (L/min) 5   SpO2 (!) 92 %   Pulse Oximetry Type Continuous   $ Pulse Oximetry - Multiple Charge Pulse Oximetry - Multiple   Pulse 96   Resp 19   Aerosol Therapy   $ Aerosol Therapy Charges Aerosol Treatment   Daily Review of Necessity (SVN) completed   Respiratory Treatment Status (SVN) given   Treatment Route (SVN) mask   Patient Position (SVN) HOB elevated   Post Treatment Assessment (SVN) breath sounds unchanged;vital signs unchanged   Signs of Intolerance (SVN) none   Preset CPAP/BiPAP Settings   Mode Of Delivery BiPAP;Standby   Education   $ Education BiPAP;Bronchodilator;15 min   Respiratory Evaluation   $ Care Plan Tech Time 15 min       
   02/21/24 0719   Patient Assessment/Suction   Level of Consciousness (AVPU) alert   Respiratory Effort Normal;Unlabored   Expansion/Accessory Muscles/Retractions expansion symmetric;no retractions;no use of accessory muscles   All Lung Fields Breath Sounds wheezes, expiratory;diminished   Skin Integrity   $ Wound Care Tech Time 15 min   Area Observed Bridge of nose   Skin Appearance without discoloration   PRE-TX-O2   Device (Oxygen Therapy) BIPAP   $ Is the patient on High Flow Oxygen? Yes   Oxygen Concentration (%) 40   SpO2 99 %   Pulse Oximetry Type Continuous   $ Pulse Oximetry - Multiple Charge Pulse Oximetry - Multiple   Pulse 90   Resp 18   Aerosol Therapy   $ Aerosol Therapy Charges Aerosol Treatment   Daily Review of Necessity (SVN) completed   Respiratory Treatment Status (SVN) given   Treatment Route (SVN) in-line   Patient Position (SVN) HOB elevated   Post Treatment Assessment (SVN) breath sounds unchanged   Signs of Intolerance (SVN) none   Ready to Wean/Extubation Screen   FIO2<=50 (chart decimal) 0.4   Preset CPAP/BiPAP Settings   Mode Of Delivery BiPAP   $ CPAP/BiPAP Daily Charge BiPAP/CPAP Daily   $ Is patient using? Yes   Size of Mask Small/Medium   Sized Appropriately? Yes   Equipment Type V60   Airway Device Type medium full face mask   Ipap 10   EPAP (cm H2O) 5   Pressure Support (cm H2O) 5   Set Rate (Breaths/Min) 14   ITime (sec) 1   Rise Time (sec) 3   Patient CPAP/BiPAP Settings   Timed Inspiration (Sec) 1   IPAP Rise Time (sec) 3   RR Total (Breaths/Min) 21   Tidal Volume (mL) 497   VE Minute Ventilation (L/min) 11 L/min   Peak Inspiratory Pressure (cm H2O) 10   TiTOT (%) 27   Total Leak (L/Min) 10   Patient Trigger - ST Mode Only (%) 87   Education   $ Education Bronchodilator;BiPAP;15 min       
Patient admitted overnight for left pubic rami fracture.  Patient seen and examined, chart reviewed.  Agree with H&P.   Orthopedic Surgery consulted.  Non-operative.  PT/OT for eval.   Avoid narcotics for pain control due to bradycardic episode after morphine administered.  Tylenol ordered PRN.       
Name band;

## 2024-02-21 NOTE — NURSING
Nurses Note -- 4 Eyes      2/21/2024   8:00 AM      Skin assessed during: Q Shift Change      [x] No Altered Skin Integrity Present    []Prevention Measures Documented      [] Yes- Altered Skin Integrity Present or Discovered   [] LDA Added if Not in Epic (Describe Wound)   [] New Altered Skin Integrity was Present on Admit and Documented in LDA   [] Wound Image Taken    Wound Care Consulted? No    Attending Nurse:  Jonatan CAMPBELL     Second RN/Staff Member:   Godwin CAMPBELL

## 2024-02-21 NOTE — PLAN OF CARE
Multiple messages left for Margaret at [facility] requesting call back to acknowledge that all requested info has been sent to them and they have reviewed and are able to take patient today for SNF.  Awaiting return call.

## 2024-02-21 NOTE — PLAN OF CARE
Notified by Margaret at  of [facility] that nurse can call report in about 20 minutes to Cesar Irving at [phone] to [facility] at [facility]. They are sending transportation. Above info forwarded to patient's primary nurse via secure chat.

## 2024-02-22 NOTE — DISCHARGE SUMMARY
[facility]  Discharge Summary      Patient Name: Marietta Gates  MRN: [id number]  DNEIS: [id number]  Patient Class: IP- Inpatient  Admission Date: 2/14/2024  Hospital Length of Stay: 6 days  Discharge Date and Time:  02/21/2024 6:29 PM  Attending Physician: No att. providers found   Discharging Provider: Estuardo Campbell MD  Primary Care Provider: Ollie Bartlett MD    Primary Care Team: PAUL HUITRON MD    HPI:   89 WF with COPD hx not on oxygen , still smokes ,  has dementia ,  HTN .     Smoker  Lives at home with daughter  Gets around on her own with cane or walker     She was in usual state of health and tripped over a bucket at home and fell on her left side.    She was then having a lot of pain the daughter said and could not bear weight on the left leg.      So they brought her in to our ER.       Here she was found to have Acute fractures of the left superior and inferior pelvic rami     They spoke to orthopedics who said it was non operative and will need PT and conservative mgt    Plan was to admit her and control pain and get PT eval and then maybe placement     Daughter was in agreement with all of this     They did confirm that she is DNR       Patient was unable to give me any history     She got small morphine IV dose in ER and then became very unresponsive and bradycardic   So then she got some narcan doses   They thought she had some deviation of eyes so they sent her for second CT of her head.  The first CT head on arrival was normal.        * No surgery found *      Hospital Course:   Patient admitted to Med/Surg.  Orthopedic Surgery consulted for acute fractures of the left superior and inferior pubic rami.  Non-operative, conservative management per Ortho, patient can weight bear as tolerated.  PT/OT consulted.  Patient with a negative reaction to Morphine requiring narcan administration.  Pain controlled with Tylenol.  Patient had increased oxygen  requirements escalating to 15 L/min.  CXR with pulmonary edema.  IV lasix started.  IV solumedrol also started.  ABG with PH 7.448, PCO2 30.5, PO2 49, HCO3 21.1.  Patient placed on Bipap and orders placed to transfer to Stepdown 2/17.  Discussed with patient's family, confirmed DNR status.  Oxygenation improving, weaned off of bipap. CXR more consistent with COPD, doesn't seem overloaded. ST consulted. Repeat CXR showed concerns for PNA, abx started. ST evaluated the patient, placed on minced diet. Plan for discharge to rehab if patient remains stable.  Oxygen was able to be weaned down.  Patient will be discharged on oral antibiotics.  Patient was also started on inhalers for her COPD.  Patient will need follow up with pulmonology, referral given.  Patient also had Cloud placed for urinary retention, will need follow up with Urology outpatient for voiding trial or this can be done as skilled nursing facility.    Physical Exam  Constitutional:       General: She is not in acute distress.  HENT:      Mouth/Throat:      Mouth: Mucous membranes are moist.   Cardiovascular:      Rate and Rhythm: Normal rate and regular rhythm.      Heart sounds: Normal heart sounds.   Pulmonary:      Effort: No respiratory distress.   Abdominal:      Palpations: Abdomen is soft     Goals of Care Treatment Preferences:  Code Status: DNR    Health care agent: Ania MARTINES Nicolas Juan M  Health care agent number: No value filed.          What is most important right now is to focus on improvement in condition but with limits to invasive therapies, comfort and QOL .  Accordingly, we have decided that the best plan to meet the patient's goals includes continuing with treatment, no further escalation in treatment.      Consults:   Consults (From admission, onward)          Status Ordering Provider     Inpatient consult to   Once        Provider:  (Not yet assigned)    Acknowledged STEFANO BECKER     Inpatient consult to Social  Services  Once        Provider:  (Not yet assigned)    Acknowledged NAOMY FISHER     Inpatient consult to Hospitalist  Once        Provider:  London Zhang MD    Acknowledged NAOMY FISHER     Inpatient consult to Orthopedic Surgery  Once        Provider:  David Posey MD    Completed ESTRELLA MCMULLEN            No new Assessment & Plan notes have been filed under this hospital service since the last note was generated.  Service: Hospital Medicine    Final Active Diagnoses:    Diagnosis Date Noted POA    PRINCIPAL PROBLEM:  Closed fracture of left hip [S72.002A] 02/15/2024 Yes    Advanced care planning/counseling discussion [Z71.89] 02/17/2024 Not Applicable      Problems Resolved During this Admission:    Diagnosis Date Noted Date Resolved POA    Hypoxemia [R09.02] 02/17/2024 02/21/2024 No    Encephalopathy, toxic [G92.9] 02/15/2024 02/21/2024 No    Hypokalemia [E87.6] 02/15/2024 02/21/2024 Yes    Multiple stable closed lateral compression fractures of pelvis [S32.82XA] 02/15/2024 02/21/2024 Yes       Discharged Condition: good    Disposition: Skilled Nursing Facility    Follow Up:   Follow-up Information       Ollie Bartlett MD Follow up in 3 day(s).    Specialty: Family Medicine  Contact information:  [address] [facility] [address] [phone]                           Patient Instructions:      Ambulatory referral/consult to Pulmonology   Standing Status: Future   Referral Priority: Routine Referral Type: Consultation   Referral Reason: Specialty Services Required   Requested Specialty: Pulmonary Disease   Number of Visits Requested: 1     Ambulatory referral/consult to Urology   Standing Status: Future   Referral Priority: Routine Referral Type: Consultation   Referral Reason: Specialty Services Required   Requested Specialty: Urology   Number of Visits Requested: 1     Diet Adult Regular   Order Comments: Diet Minced and Moist     Notify your health care provider if you  experience any of the following:  temperature >100.4     Notify your health care provider if you experience any of the following:  persistent nausea and vomiting or diarrhea     Notify your health care provider if you experience any of the following:  severe uncontrolled pain     Notify your health care provider if you experience any of the following:  redness, tenderness, or signs of infection (pain, swelling, redness, odor or green/yellow discharge around incision site)     Activity as tolerated       Significant Diagnostic Studies: Labs: CMP   Recent Labs   Lab 02/19/24 2007 02/20/24 0401 02/21/24 0431   NA  --  138 136   K 3.9 3.7 4.3   CL  --  98 99   CO2  --  31* 27   GLU  --  195* 159*   BUN  --  69* 55*   CREATININE  --  1.4 1.1   CALCIUM  --  9.0 8.3*   PROT  --  7.4 6.4   ALBUMIN  --  3.4* 3.1*   BILITOT  --  0.6 0.5   ALKPHOS  --  47* 40*   AST  --  23 24   ALT  --  14 15   ANIONGAP  --  9 10    and CBC   Recent Labs   Lab 02/20/24 0401 02/21/24 0431   WBC 10.83 12.20   HGB 13.0 13.0   HCT 39.5 40.5    247       Pending Diagnostic Studies:       None           Medications:  Reconciled Home Medications:      Medication List        START taking these medications      acetaminophen 500 MG tablet  Commonly known as: TYLENOL  Take 2 tablets (1,000 mg total) by mouth every 8 (eight) hours as needed for Pain.     azithromycin 250 MG tablet  Commonly known as: Z-RICHAR  Take 1 tablet (250 mg total) by mouth once daily. for 3 days  Start taking on: February 22, 2024     budesonide-formoterol 80-4.5 mcg 80-4.5 mcg/actuation Hfaa  Commonly known as: SYMBICORT  Inhale 2 puffs into the lungs 2 (two) times a day. Controller     cefdinir 300 MG capsule  Commonly known as: OMNICEF  Take 1 capsule (300 mg total) by mouth 2 (two) times daily. for 3 days     polyethylene glycol 17 gram Pwpk  Commonly known as: GLYCOLAX  Take 17 g by mouth once daily. for 10 days  Start taking on: February 22, 2024     potassium  chloride 10 MEQ Cpsr  Commonly known as: MICRO-K  Take 1 capsule (10 mEq total) by mouth once daily.  Start taking on: February 22, 2024     predniSONE 20 MG tablet  Commonly known as: DELTASONE  Take 2 tablets (40 mg total) by mouth once daily. for 3 days  Start taking on: February 22, 2024            CONTINUE taking these medications      amLODIPine 5 MG tablet  Commonly known as: NORVASC  Take 1 tablet (5 mg total) by mouth once daily.     ascorbic acid (vitamin C) 500 MG tablet  Commonly known as: VITAMIN C  Take 500 mg by mouth once daily.     buPROPion 300 MG 24 hr tablet  Commonly known as: WELLBUTRIN XL  Take 1 tablet (300 mg total) by mouth once daily.     calcium carbonate 500 mg calcium (1,250 mg) chewable tablet  Commonly known as: OS-KATELYN  Take 2 tablets by mouth once daily. 300 mg gummy chewable     levocetirizine 5 MG tablet  Commonly known as: XYZAL  Take 1 tablet (5 mg total) by mouth nightly as needed for Allergies.     losartan 100 MG tablet  Commonly known as: COZAAR  Take 1 tablet (100 mg total) by mouth once daily.     lovastatin 20 MG tablet  Commonly known as: MEVACOR  TAKE 1 TABLET(20 MG) BY MOUTH EVERY EVENING     multivitamin capsule  Take 1 capsule by mouth once daily.     OCUVITE PRESERVISION ORAL  Take 2 tablets by mouth once daily.              Indwelling Lines/Drains at time of discharge:   Lines/Drains/Airways       Drain  Duration                  Urethral Catheter 02/20/24 1512 1 day                    Time spent on the discharge of patient: 37 minutes         Estuardo Campbell MD  Department of Hospital Medicine  [facility]

## 2024-02-23 ENCOUNTER — EXTERNAL HOME HEALTH (OUTPATIENT)
Dept: HOME HEALTH SERVICES | Facility: HOSPITAL | Age: 89
End: 2024-02-23
Payer: MEDICARE

## 2024-03-13 ENCOUNTER — DOCUMENT SCAN (OUTPATIENT)
Dept: HOME HEALTH SERVICES | Facility: HOSPITAL | Age: 89
End: 2024-03-13
Payer: MEDICARE

## 2025-01-14 DIAGNOSIS — Z00.00 ENCOUNTER FOR MEDICARE ANNUAL WELLNESS EXAM: ICD-10-CM

## 2025-06-24 DIAGNOSIS — F51.01 PRIMARY INSOMNIA: Primary | ICD-10-CM

## 2025-06-24 RX ORDER — LORAZEPAM 0.5 MG/1
0.5 TABLET ORAL NIGHTLY
Qty: 30 TABLET | Refills: 0 | Status: SHIPPED | OUTPATIENT
Start: 2025-06-24 | End: 2025-07-24